# Patient Record
Sex: FEMALE | Race: WHITE | Employment: OTHER | URBAN - METROPOLITAN AREA
[De-identification: names, ages, dates, MRNs, and addresses within clinical notes are randomized per-mention and may not be internally consistent; named-entity substitution may affect disease eponyms.]

---

## 2017-02-09 PROBLEM — N18.9 CKD (CHRONIC KIDNEY DISEASE): Chronic | Status: ACTIVE | Noted: 2017-02-09

## 2017-02-09 PROBLEM — M46.46 DISCITIS OF LUMBAR REGION: Status: ACTIVE | Noted: 2017-02-09

## 2017-02-09 PROBLEM — I10 HTN (HYPERTENSION): Chronic | Status: ACTIVE | Noted: 2017-02-09

## 2017-02-09 PROBLEM — M54.50 CHRONIC LOWER BACK PAIN: Chronic | Status: ACTIVE | Noted: 2017-02-09

## 2017-02-09 PROBLEM — E78.5 HYPERLIPIDEMIA: Chronic | Status: ACTIVE | Noted: 2017-02-09

## 2017-02-09 PROBLEM — G89.29 CHRONIC LOWER BACK PAIN: Chronic | Status: ACTIVE | Noted: 2017-02-09

## 2017-02-10 PROBLEM — G25.81 RLS (RESTLESS LEGS SYNDROME): Chronic | Status: ACTIVE | Noted: 2017-02-10

## 2017-03-02 PROBLEM — G89.29 CHRONIC PAIN: Chronic | Status: ACTIVE | Noted: 2017-03-02

## 2017-03-02 PROBLEM — M47.816 OSTEOARTHRITIS OF LUMBAR SPINE: Chronic | Status: ACTIVE | Noted: 2017-03-02

## 2017-03-02 PROBLEM — M47.817 LUMBOSACRAL SPONDYLOSIS: Chronic | Status: ACTIVE | Noted: 2017-03-02

## 2017-03-02 PROBLEM — M47.816 LUMBAR FACET ARTHROPATHY: Chronic | Status: ACTIVE | Noted: 2017-03-02

## 2017-03-02 PROBLEM — M51.36 BULGING LUMBAR DISC: Chronic | Status: ACTIVE | Noted: 2017-02-09

## 2017-03-02 PROBLEM — M51.36 BULGING LUMBAR DISC: Status: ACTIVE | Noted: 2017-02-09

## 2017-03-02 PROBLEM — M48.061 LUMBAR FORAMINAL STENOSIS: Chronic | Status: ACTIVE | Noted: 2017-03-02

## 2018-06-13 ENCOUNTER — OFFICE VISIT (OUTPATIENT)
Dept: NEUROSURGERY | Age: 83
End: 2018-06-13
Payer: MEDICARE

## 2018-06-13 VITALS
SYSTOLIC BLOOD PRESSURE: 169 MMHG | BODY MASS INDEX: 25.72 KG/M2 | WEIGHT: 131 LBS | DIASTOLIC BLOOD PRESSURE: 103 MMHG | HEIGHT: 60 IN

## 2018-06-13 DIAGNOSIS — G89.29 CHRONIC MIDLINE LOW BACK PAIN WITH BILATERAL SCIATICA: Primary | ICD-10-CM

## 2018-06-13 DIAGNOSIS — M54.41 CHRONIC MIDLINE LOW BACK PAIN WITH BILATERAL SCIATICA: Primary | ICD-10-CM

## 2018-06-13 DIAGNOSIS — M54.42 CHRONIC MIDLINE LOW BACK PAIN WITH BILATERAL SCIATICA: Primary | ICD-10-CM

## 2018-06-13 PROCEDURE — G8427 DOCREV CUR MEDS BY ELIG CLIN: HCPCS | Performed by: NEUROLOGICAL SURGERY

## 2018-06-13 PROCEDURE — G8399 PT W/DXA RESULTS DOCUMENT: HCPCS | Performed by: NEUROLOGICAL SURGERY

## 2018-06-13 PROCEDURE — 99213 OFFICE O/P EST LOW 20 MIN: CPT | Performed by: NEUROLOGICAL SURGERY

## 2018-06-13 PROCEDURE — 4040F PNEUMOC VAC/ADMIN/RCVD: CPT | Performed by: NEUROLOGICAL SURGERY

## 2018-06-13 PROCEDURE — 1123F ACP DISCUSS/DSCN MKR DOCD: CPT | Performed by: NEUROLOGICAL SURGERY

## 2018-06-13 PROCEDURE — G8419 CALC BMI OUT NRM PARAM NOF/U: HCPCS | Performed by: NEUROLOGICAL SURGERY

## 2018-06-13 PROCEDURE — 1090F PRES/ABSN URINE INCON ASSESS: CPT | Performed by: NEUROLOGICAL SURGERY

## 2018-06-13 PROCEDURE — 1036F TOBACCO NON-USER: CPT | Performed by: NEUROLOGICAL SURGERY

## 2018-06-13 RX ORDER — HYDROCODONE BITARTRATE AND ACETAMINOPHEN 5; 325 MG/1; MG/1
1 TABLET ORAL EVERY 12 HOURS PRN
Qty: 14 TABLET | Refills: 0 | Status: SHIPPED | OUTPATIENT
Start: 2018-06-13 | End: 2018-06-20

## 2018-06-15 ENCOUNTER — HOSPITAL ENCOUNTER (OUTPATIENT)
Age: 83
Discharge: HOME | End: 2018-06-17
Payer: MEDICARE

## 2018-06-15 LAB
ALBUMIN SERPL-MCNC: 4.2 G/DL (ref 3.5–5.2)
ALP BLD-CCNC: 104 U/L (ref 35–104)
ALT SERPL-CCNC: 24 U/L (ref 0–32)
ANION GAP SERPL CALCULATED.3IONS-SCNC: 16 MMOL/L (ref 7–16)
AST SERPL-CCNC: 29 U/L (ref 0–31)
BASOPHILS ABSOLUTE: 0.02 E9/L (ref 0–0.2)
BASOPHILS RELATIVE PERCENT: 0.3 % (ref 0–2)
BILIRUB SERPL-MCNC: 0.4 MG/DL (ref 0–1.2)
BUN BLDV-MCNC: 28 MG/DL (ref 8–23)
CALCIUM SERPL-MCNC: 9.5 MG/DL (ref 8.6–10.2)
CHLORIDE BLD-SCNC: 101 MMOL/L (ref 98–107)
CO2: 24 MMOL/L (ref 22–29)
CREAT SERPL-MCNC: 1.7 MG/DL (ref 0.5–1)
EOSINOPHILS ABSOLUTE: 0.13 E9/L (ref 0.05–0.5)
EOSINOPHILS RELATIVE PERCENT: 1.6 % (ref 0–6)
GFR AFRICAN AMERICAN: 35
GFR NON-AFRICAN AMERICAN: 29 ML/MIN/1.73
GLUCOSE BLD-MCNC: 92 MG/DL (ref 74–109)
HCT VFR BLD CALC: 36.7 % (ref 34–48)
HEMOGLOBIN: 11.5 G/DL (ref 11.5–15.5)
IMMATURE GRANULOCYTES #: 0.02 E9/L
IMMATURE GRANULOCYTES %: 0.3 % (ref 0–5)
LYMPHOCYTES ABSOLUTE: 1.38 E9/L (ref 1.5–4)
LYMPHOCYTES RELATIVE PERCENT: 17.3 % (ref 20–42)
MCH RBC QN AUTO: 29.6 PG (ref 26–35)
MCHC RBC AUTO-ENTMCNC: 31.3 % (ref 32–34.5)
MCV RBC AUTO: 94.3 FL (ref 80–99.9)
MONOCYTES ABSOLUTE: 0.79 E9/L (ref 0.1–0.95)
MONOCYTES RELATIVE PERCENT: 9.9 % (ref 2–12)
NEUTROPHILS ABSOLUTE: 5.64 E9/L (ref 1.8–7.3)
NEUTROPHILS RELATIVE PERCENT: 70.6 % (ref 43–80)
PDW BLD-RTO: 14.7 FL (ref 11.5–15)
PLATELET # BLD: 283 E9/L (ref 130–450)
PMV BLD AUTO: 9.4 FL (ref 7–12)
POTASSIUM SERPL-SCNC: 4.4 MMOL/L (ref 3.5–5)
RBC # BLD: 3.89 E12/L (ref 3.5–5.5)
SODIUM BLD-SCNC: 141 MMOL/L (ref 132–146)
T4 FREE: 1.6 NG/DL (ref 0.93–1.7)
TOTAL PROTEIN: 7.1 G/DL (ref 6.4–8.3)
TSH SERPL DL<=0.05 MIU/L-ACNC: 1.55 UIU/ML (ref 0.27–4.2)
WBC # BLD: 8 E9/L (ref 4.5–11.5)

## 2018-06-15 PROCEDURE — 85025 COMPLETE CBC W/AUTO DIFF WBC: CPT

## 2018-06-15 PROCEDURE — 84443 ASSAY THYROID STIM HORMONE: CPT

## 2018-06-15 PROCEDURE — 84439 ASSAY OF FREE THYROXINE: CPT

## 2018-06-15 PROCEDURE — 80053 COMPREHEN METABOLIC PANEL: CPT

## 2018-07-17 ENCOUNTER — TELEPHONE (OUTPATIENT)
Dept: NEUROSURGERY | Age: 83
End: 2018-07-17

## 2018-08-17 ENCOUNTER — PREP FOR PROCEDURE (OUTPATIENT)
Dept: NEUROSURGERY | Age: 83
End: 2018-08-17

## 2018-08-17 DIAGNOSIS — M51.9 LUMBAR DISC DISEASE: Primary | ICD-10-CM

## 2018-08-17 RX ORDER — SODIUM CHLORIDE 0.9 % (FLUSH) 0.9 %
10 SYRINGE (ML) INJECTION PRN
Status: CANCELLED | OUTPATIENT
Start: 2018-08-17 | End: 2019-08-17

## 2018-08-17 RX ORDER — SODIUM CHLORIDE 0.9 % (FLUSH) 0.9 %
10 SYRINGE (ML) INJECTION EVERY 12 HOURS SCHEDULED
Status: CANCELLED | OUTPATIENT
Start: 2018-08-17 | End: 2019-08-17

## 2018-08-17 RX ORDER — SODIUM CHLORIDE 9 MG/ML
INJECTION, SOLUTION INTRAVENOUS CONTINUOUS
Status: CANCELLED | OUTPATIENT
Start: 2018-08-17 | End: 2019-08-17

## 2018-09-10 ENCOUNTER — HOSPITAL ENCOUNTER (OUTPATIENT)
Dept: GENERAL RADIOLOGY | Age: 83
Discharge: HOME | DRG: 454 | End: 2018-09-12
Payer: MEDICARE

## 2018-09-10 ENCOUNTER — HOSPITAL ENCOUNTER (OUTPATIENT)
Dept: PREADMISSION TESTING | Age: 83
Discharge: HOME | DRG: 454 | End: 2018-09-10
Payer: MEDICARE

## 2018-09-10 ENCOUNTER — ANESTHESIA EVENT (OUTPATIENT)
Dept: OPERATING ROOM | Age: 83
DRG: 454 | End: 2018-09-10
Payer: MEDICARE

## 2018-09-10 VITALS
DIASTOLIC BLOOD PRESSURE: 83 MMHG | BODY MASS INDEX: 25.13 KG/M2 | RESPIRATION RATE: 12 BRPM | TEMPERATURE: 97.5 F | HEART RATE: 66 BPM | OXYGEN SATURATION: 98 % | WEIGHT: 128 LBS | HEIGHT: 60 IN | SYSTOLIC BLOOD PRESSURE: 175 MMHG

## 2018-09-10 DIAGNOSIS — M51.9 LUMBAR DISC DISEASE: ICD-10-CM

## 2018-09-10 DIAGNOSIS — Z01.818 PREOP TESTING: Primary | ICD-10-CM

## 2018-09-10 LAB
ABO/RH: NORMAL
ANION GAP SERPL CALCULATED.3IONS-SCNC: 11 MMOL/L (ref 7–16)
ANTIBODY SCREEN: NORMAL
BASOPHILS ABSOLUTE: 0.02 E9/L (ref 0–0.2)
BASOPHILS RELATIVE PERCENT: 0.3 % (ref 0–2)
BILIRUBIN URINE: NEGATIVE
BLOOD, URINE: NEGATIVE
BUN BLDV-MCNC: 31 MG/DL (ref 8–23)
CALCIUM SERPL-MCNC: 9.5 MG/DL (ref 8.6–10.2)
CHLORIDE BLD-SCNC: 105 MMOL/L (ref 98–107)
CLARITY: CLEAR
CO2: 27 MMOL/L (ref 22–29)
COLOR: YELLOW
CREAT SERPL-MCNC: 1.5 MG/DL (ref 0.5–1)
EOSINOPHILS ABSOLUTE: 0.14 E9/L (ref 0.05–0.5)
EOSINOPHILS RELATIVE PERCENT: 2 % (ref 0–6)
GFR AFRICAN AMERICAN: 40
GFR NON-AFRICAN AMERICAN: 33 ML/MIN/1.73
GLUCOSE BLD-MCNC: 91 MG/DL (ref 74–109)
GLUCOSE URINE: NEGATIVE MG/DL
HCT VFR BLD CALC: 36.3 % (ref 34–48)
HEMOGLOBIN: 11.8 G/DL (ref 11.5–15.5)
IMMATURE GRANULOCYTES #: 0.02 E9/L
IMMATURE GRANULOCYTES %: 0.3 % (ref 0–5)
INR BLD: 1
KETONES, URINE: NEGATIVE MG/DL
LEUKOCYTE ESTERASE, URINE: NEGATIVE
LYMPHOCYTES ABSOLUTE: 1.48 E9/L (ref 1.5–4)
LYMPHOCYTES RELATIVE PERCENT: 21.1 % (ref 20–42)
MCH RBC QN AUTO: 30.3 PG (ref 26–35)
MCHC RBC AUTO-ENTMCNC: 32.5 % (ref 32–34.5)
MCV RBC AUTO: 93.1 FL (ref 80–99.9)
MONOCYTES ABSOLUTE: 0.64 E9/L (ref 0.1–0.95)
MONOCYTES RELATIVE PERCENT: 9.1 % (ref 2–12)
NEUTROPHILS ABSOLUTE: 4.7 E9/L (ref 1.8–7.3)
NEUTROPHILS RELATIVE PERCENT: 67.2 % (ref 43–80)
NITRITE, URINE: NEGATIVE
PDW BLD-RTO: 14.7 FL (ref 11.5–15)
PH UA: 6 (ref 5–9)
PLATELET # BLD: 254 E9/L (ref 130–450)
PMV BLD AUTO: 8.8 FL (ref 7–12)
POTASSIUM REFLEX MAGNESIUM: 4.7 MMOL/L (ref 3.5–5)
PROTEIN UA: NEGATIVE MG/DL
PROTHROMBIN TIME: 11.1 SEC (ref 9.3–12.4)
RBC # BLD: 3.9 E12/L (ref 3.5–5.5)
SODIUM BLD-SCNC: 143 MMOL/L (ref 132–146)
SPECIFIC GRAVITY UA: 1.02 (ref 1–1.03)
UROBILINOGEN, URINE: 0.2 E.U./DL
WBC # BLD: 7 E9/L (ref 4.5–11.5)

## 2018-09-10 PROCEDURE — 36415 COLL VENOUS BLD VENIPUNCTURE: CPT

## 2018-09-10 PROCEDURE — 87081 CULTURE SCREEN ONLY: CPT

## 2018-09-10 PROCEDURE — 81003 URINALYSIS AUTO W/O SCOPE: CPT

## 2018-09-10 PROCEDURE — 86901 BLOOD TYPING SEROLOGIC RH(D): CPT

## 2018-09-10 PROCEDURE — 86850 RBC ANTIBODY SCREEN: CPT

## 2018-09-10 PROCEDURE — 71046 X-RAY EXAM CHEST 2 VIEWS: CPT

## 2018-09-10 PROCEDURE — 80048 BASIC METABOLIC PNL TOTAL CA: CPT

## 2018-09-10 PROCEDURE — 85610 PROTHROMBIN TIME: CPT

## 2018-09-10 PROCEDURE — 86900 BLOOD TYPING SEROLOGIC ABO: CPT

## 2018-09-10 PROCEDURE — 87088 URINE BACTERIA CULTURE: CPT

## 2018-09-10 PROCEDURE — 85025 COMPLETE CBC W/AUTO DIFF WBC: CPT

## 2018-09-10 ASSESSMENT — PAIN DESCRIPTION - PROGRESSION: CLINICAL_PROGRESSION: GRADUALLY WORSENING

## 2018-09-10 ASSESSMENT — PAIN DESCRIPTION - DESCRIPTORS: DESCRIPTORS: NUMBNESS

## 2018-09-10 ASSESSMENT — PAIN DESCRIPTION - ORIENTATION: ORIENTATION: LEFT

## 2018-09-10 ASSESSMENT — PAIN DESCRIPTION - LOCATION: LOCATION: BACK;FOOT

## 2018-09-10 ASSESSMENT — PAIN DESCRIPTION - ONSET: ONSET: ON-GOING

## 2018-09-10 NOTE — PROGRESS NOTES
Mahnazoh[address] 37 PRE-ADMISSION TESTING GENERAL INSTRUCTIONS- [facility]-phone number:[phone]    GENERAL INSTRUCTIONS  [x] Antibacterial Soap shower Night before and/or AM of Surgery  [x] Onofre wipe instruction sheet and wipes given. [x] Nothing by mouth after midnight, including gum, candy, mints, or water. [x] You may brush your teeth, gargle, but do NOT swallow water. []Hibiclens shower  the night before and the morning of surgery. Do not use             Hibiclens on your face or head. [x]No smoking, chewing tobacco, illegal drugs, or alcohol within 24 hours of your surgery. [x] Jewelry, valuables or body piercing's should not be brought to the hospital. All body and/or tongue piercing's must be removed prior to arriving to hospital.  ALL hair pins must be removed. [x] Do not wear makeup, lotions, powders, deodorant. Nail polish as directed by the nurse. [x] Arrange transportation to and from the hospital.  Arrange for someone to be with you for the remainder of the day and for 24 hours after your procedure due to having had anesthesia. [] Bring insurance card and photo ID. [x] Transfusion Bracelet: Please bring with you to hospital, day of surgery  [] Bring urine specimen day of surgery. Any small container is acceptable. [] Use inhalers the morning of surgery and bring with you to hospital.   []Bring copy of living will or healthcare power of  papers to be placed in your electronic record. [] CPAP/BI-PAP: Please bring your machine if you are to spend the night in the hospital.     ENDOSCOPY INSTRUCTIONS:   [] Bowel prep instructions reviewed. [] Nothing by mouth after midnight, including gum, candy, mints, or water.  [] You may brush your teeth, gargle, but do NOT swallow water. [] Do not wear makeup, lotions, powders, deodorant. Nail polish as directed by the nurse.   [] Arrange transportation to and from the hospital.  Arrange for someone to be with you for the remainder of the day due to having had anesthesia. PARKING INSTRUCTIONS:   · [x] Arrival [id number] [x] Parking lot 1 is located on [facility] (the corner of [facility] and [facility]). To enter, press the button and the gate will lift. A free token will be provided to exit the lot. One car per patient is allowed to park in this lot. All other cars are to park on [address] [place] [address] Street either in the parking garage or the handicap lot. [] Free  parking is available on [address] [place] [address] Street. · [] To reach the [facility] lobby from [address] [place] [address], upon entering the hospital, take elevator B to the 3rd floor. EDUCATION INSTRUCTIONS:      [] Knee or hip replacement booklet & exercise pamphlets given. [] Lucas Londono placed in chart. [x] Pre-admission Testing educational folder given  [x] Incentive Spirometry,coughing & deep breathing exercises reviewed. []Medication information sheet(s)   [x]Fluoroscopy-Xray used in surgery reviewed with patient. Educational pamphlet placed in chart. [x]Pain: Post-op pain is normal and to be expected. You will be asked to rate your pain from 0-10(a zero is not acceptable-education is needed). Your post-op pain goal is:  [x] Ask your nurse for your pain medication. [] Joint camp offered. [] Joint replacement booklets given. []Other     MEDICATION INSTRUCTIONS:   [x]Bring a complete list of your medications, please write the last time you took the medicine, give this list to the nurse. GIVEN  [x] Take the following medications the morning of surgery with 1-2 ounces of water:   [] Stop herbal supplements and vitamins 5 days before your surgery. [] DO NOT take any diabetic medicine the morning of surgery. Follow instructions for insulin the day before surgery. [] If you are diabetic and your blood sugar is low or you feel symptomatic, you may drink 1-2 ounces of apple juice or take a glucose tablet.   The morning of your procedure, you

## 2018-09-11 LAB — URINE CULTURE, ROUTINE: NORMAL

## 2018-09-12 LAB — MRSA CULTURE ONLY: NORMAL

## 2018-09-13 ENCOUNTER — HOSPITAL ENCOUNTER (INPATIENT)
Age: 83
LOS: 4 days | Discharge: NURSING, REHAB OR LONG-TERM CARE | DRG: 454 | End: 2018-09-17
Attending: NEUROLOGICAL SURGERY | Admitting: NEUROLOGICAL SURGERY
Payer: MEDICARE

## 2018-09-13 ENCOUNTER — ANESTHESIA (OUTPATIENT)
Dept: OPERATING ROOM | Age: 83
DRG: 454 | End: 2018-09-13
Payer: MEDICARE

## 2018-09-13 ENCOUNTER — APPOINTMENT (OUTPATIENT)
Dept: GENERAL RADIOLOGY | Age: 83
DRG: 454 | End: 2018-09-13
Attending: NEUROLOGICAL SURGERY
Payer: MEDICARE

## 2018-09-13 VITALS
OXYGEN SATURATION: 100 % | DIASTOLIC BLOOD PRESSURE: 87 MMHG | TEMPERATURE: 93.4 F | RESPIRATION RATE: 12 BRPM | SYSTOLIC BLOOD PRESSURE: 195 MMHG

## 2018-09-13 DIAGNOSIS — M54.40 CHRONIC LOW BACK PAIN WITH SCIATICA, SCIATICA LATERALITY UNSPECIFIED, UNSPECIFIED BACK PAIN LATERALITY: Primary | Chronic | ICD-10-CM

## 2018-09-13 DIAGNOSIS — G89.29 CHRONIC LOW BACK PAIN WITH SCIATICA, SCIATICA LATERALITY UNSPECIFIED, UNSPECIFIED BACK PAIN LATERALITY: Primary | Chronic | ICD-10-CM

## 2018-09-13 PROBLEM — M51.26 LUMBAR DISC HERNIATION: Status: ACTIVE | Noted: 2018-09-13

## 2018-09-13 LAB
BLOOD BANK DISPENSE STATUS: NORMAL
BLOOD BANK DISPENSE STATUS: NORMAL
BLOOD BANK PRODUCT CODE: NORMAL
BLOOD BANK PRODUCT CODE: NORMAL
BPU ID: NORMAL
BPU ID: NORMAL
DESCRIPTION BLOOD BANK: NORMAL
DESCRIPTION BLOOD BANK: NORMAL
HCT VFR BLD CALC: 18.2 % (ref 34–48)
HEMOGLOBIN: 5.9 G/DL (ref 11.5–15.5)

## 2018-09-13 PROCEDURE — 3600000015 HC SURGERY LEVEL 5 ADDTL 15MIN: Performed by: NEUROLOGICAL SURGERY

## 2018-09-13 PROCEDURE — 6360000002 HC RX W HCPCS

## 2018-09-13 PROCEDURE — 22633 ARTHRD CMBN 1NTRSPC LUMBAR: CPT | Performed by: NEUROLOGICAL SURGERY

## 2018-09-13 PROCEDURE — 2720000010 HC SURG SUPPLY STERILE: Performed by: NEUROLOGICAL SURGERY

## 2018-09-13 PROCEDURE — 22842 INSERT SPINE FIXATION DEVICE: CPT | Performed by: PHYSICIAN ASSISTANT

## 2018-09-13 PROCEDURE — 88311 DECALCIFY TISSUE: CPT

## 2018-09-13 PROCEDURE — 22842 INSERT SPINE FIXATION DEVICE: CPT | Performed by: NEUROLOGICAL SURGERY

## 2018-09-13 PROCEDURE — 7100000000 HC PACU RECOVERY - FIRST 15 MIN: Performed by: NEUROLOGICAL SURGERY

## 2018-09-13 PROCEDURE — 6360000002 HC RX W HCPCS: Performed by: NURSE ANESTHETIST, CERTIFIED REGISTERED

## 2018-09-13 PROCEDURE — 22633 ARTHRD CMBN 1NTRSPC LUMBAR: CPT | Performed by: PHYSICIAN ASSISTANT

## 2018-09-13 PROCEDURE — 63047 LAM FACETEC & FORAMOT LUMBAR: CPT | Performed by: NEUROLOGICAL SURGERY

## 2018-09-13 PROCEDURE — 6370000000 HC RX 637 (ALT 250 FOR IP): Performed by: NEUROLOGICAL SURGERY

## 2018-09-13 PROCEDURE — 63048 LAM FACETEC &FORAMOT EA ADDL: CPT | Performed by: NEUROLOGICAL SURGERY

## 2018-09-13 PROCEDURE — 2709999900 HC NON-CHARGEABLE SUPPLY: Performed by: NEUROLOGICAL SURGERY

## 2018-09-13 PROCEDURE — 22614 ARTHRD PST TQ 1NTRSPC EA ADD: CPT | Performed by: PHYSICIAN ASSISTANT

## 2018-09-13 PROCEDURE — 6360000002 HC RX W HCPCS: Performed by: PHYSICIAN ASSISTANT

## 2018-09-13 PROCEDURE — 22614 ARTHRD PST TQ 1NTRSPC EA ADD: CPT | Performed by: NEUROLOGICAL SURGERY

## 2018-09-13 PROCEDURE — 95940 IONM IN OPERATNG ROOM 15 MIN: CPT | Performed by: AUDIOLOGIST

## 2018-09-13 PROCEDURE — 86923 COMPATIBILITY TEST ELECTRIC: CPT

## 2018-09-13 PROCEDURE — 2580000003 HC RX 258: Performed by: NEUROLOGICAL SURGERY

## 2018-09-13 PROCEDURE — C1713 ANCHOR/SCREW BN/BN,TIS/BN: HCPCS | Performed by: NEUROLOGICAL SURGERY

## 2018-09-13 PROCEDURE — 2500000003 HC RX 250 WO HCPCS: Performed by: NEUROLOGICAL SURGERY

## 2018-09-13 PROCEDURE — 6360000002 HC RX W HCPCS: Performed by: ANESTHESIOLOGY

## 2018-09-13 PROCEDURE — 0SG10AJ FUSION OF 2 OR MORE LUMBAR VERTEBRAL JOINTS WITH INTERBODY FUSION DEVICE, POSTERIOR APPROACH, ANTERIOR COLUMN, OPEN APPROACH: ICD-10-PCS | Performed by: NEUROLOGICAL SURGERY

## 2018-09-13 PROCEDURE — L8699 PROSTHETIC IMPLANT NOS: HCPCS | Performed by: NEUROLOGICAL SURGERY

## 2018-09-13 PROCEDURE — 2580000003 HC RX 258: Performed by: PHYSICIAN ASSISTANT

## 2018-09-13 PROCEDURE — 4A11X4G MONITORING OF PERIPHERAL NERVOUS ELECTRICAL ACTIVITY, INTRAOPERATIVE, EXTERNAL APPROACH: ICD-10-PCS | Performed by: NEUROLOGICAL SURGERY

## 2018-09-13 PROCEDURE — 0SG1071 FUSION OF 2 OR MORE LUMBAR VERTEBRAL JOINTS WITH AUTOLOGOUS TISSUE SUBSTITUTE, POSTERIOR APPROACH, POSTERIOR COLUMN, OPEN APPROACH: ICD-10-PCS | Performed by: NEUROLOGICAL SURGERY

## 2018-09-13 PROCEDURE — 3700000001 HC ADD 15 MINUTES (ANESTHESIA): Performed by: NEUROLOGICAL SURGERY

## 2018-09-13 PROCEDURE — 95910 NRV CNDJ TEST 7-8 STUDIES: CPT | Performed by: AUDIOLOGIST

## 2018-09-13 PROCEDURE — 88304 TISSUE EXAM BY PATHOLOGIST: CPT

## 2018-09-13 PROCEDURE — 2500000003 HC RX 250 WO HCPCS: Performed by: NURSE ANESTHETIST, CERTIFIED REGISTERED

## 2018-09-13 PROCEDURE — 36415 COLL VENOUS BLD VENIPUNCTURE: CPT

## 2018-09-13 PROCEDURE — P9016 RBC LEUKOCYTES REDUCED: HCPCS

## 2018-09-13 PROCEDURE — 63047 LAM FACETEC & FORAMOT LUMBAR: CPT | Performed by: PHYSICIAN ASSISTANT

## 2018-09-13 PROCEDURE — 3E0U0GB INTRODUCTION OF RECOMBINANT BONE MORPHOGENETIC PROTEIN INTO JOINTS, OPEN APPROACH: ICD-10-PCS | Performed by: NEUROLOGICAL SURGERY

## 2018-09-13 PROCEDURE — 3700000000 HC ANESTHESIA ATTENDED CARE: Performed by: NEUROLOGICAL SURGERY

## 2018-09-13 PROCEDURE — 3209999900 FLUORO FOR SURGICAL PROCEDURES

## 2018-09-13 PROCEDURE — 7100000001 HC PACU RECOVERY - ADDTL 15 MIN: Performed by: NEUROLOGICAL SURGERY

## 2018-09-13 PROCEDURE — 85018 HEMOGLOBIN: CPT

## 2018-09-13 PROCEDURE — 6360000002 HC RX W HCPCS: Performed by: NEUROLOGICAL SURGERY

## 2018-09-13 PROCEDURE — 1200000000 HC SEMI PRIVATE

## 2018-09-13 PROCEDURE — 88307 TISSUE EXAM BY PATHOLOGIST: CPT

## 2018-09-13 PROCEDURE — P9041 ALBUMIN (HUMAN),5%, 50ML: HCPCS | Performed by: NURSE ANESTHETIST, CERTIFIED REGISTERED

## 2018-09-13 PROCEDURE — 0SG00AJ FUSION OF LUMBAR VERTEBRAL JOINT WITH INTERBODY FUSION DEVICE, POSTERIOR APPROACH, ANTERIOR COLUMN, OPEN APPROACH: ICD-10-PCS | Performed by: NEUROLOGICAL SURGERY

## 2018-09-13 PROCEDURE — 22853 INSJ BIOMECHANICAL DEVICE: CPT | Performed by: PHYSICIAN ASSISTANT

## 2018-09-13 PROCEDURE — 0ST20ZZ RESECTION OF LUMBAR VERTEBRAL DISC, OPEN APPROACH: ICD-10-PCS | Performed by: NEUROLOGICAL SURGERY

## 2018-09-13 PROCEDURE — 63048 LAM FACETEC &FORAMOT EA ADDL: CPT | Performed by: PHYSICIAN ASSISTANT

## 2018-09-13 PROCEDURE — 2580000003 HC RX 258: Performed by: ANESTHESIOLOGY

## 2018-09-13 PROCEDURE — 22853 INSJ BIOMECHANICAL DEVICE: CPT | Performed by: NEUROLOGICAL SURGERY

## 2018-09-13 PROCEDURE — P9041 ALBUMIN (HUMAN),5%, 50ML: HCPCS

## 2018-09-13 PROCEDURE — 2700000000 HC OXYGEN THERAPY PER DAY

## 2018-09-13 PROCEDURE — 85014 HEMATOCRIT: CPT

## 2018-09-13 PROCEDURE — 3600000005 HC SURGERY LEVEL 5 BASE: Performed by: NEUROLOGICAL SURGERY

## 2018-09-13 PROCEDURE — 2580000003 HC RX 258: Performed by: NURSE ANESTHETIST, CERTIFIED REGISTERED

## 2018-09-13 DEVICE — CEMENT C01A KYPHX HV-R BONE CEMENT EN
Type: IMPLANTABLE DEVICE | Site: VERTEBRAE | Status: FUNCTIONAL
Brand: KYPHON® HV-R® BONE CEMENT

## 2018-09-13 DEVICE — SET SCREW 5440030 4.75 TI NS BRK OFF
Type: IMPLANTABLE DEVICE | Site: VERTEBRAE | Status: FUNCTIONAL
Brand: CD HORIZON® SPINAL SYSTEM

## 2018-09-13 DEVICE — BONE GRAFT KIT 7510800 INFUSE LARGE II
Type: IMPLANTABLE DEVICE | Site: VERTEBRAE | Status: FUNCTIONAL
Brand: INFUSE® BONE GRAFT

## 2018-09-13 DEVICE — SCREW 54840005540 MAS 5.5X40 CC
Type: IMPLANTABLE DEVICE | Site: VERTEBRAE | Status: FUNCTIONAL
Brand: CD HORIZON® SPINAL SYSTEM

## 2018-09-13 DEVICE — SPACER 2991022 CAPSTONE PEEK 10X22
Type: IMPLANTABLE DEVICE | Site: VERTEBRAE | Status: FUNCTIONAL
Brand: CAPSTONE® SPINAL SYSTEM

## 2018-09-13 DEVICE — SCREW 54840006540 MAS 6.5X40 CC
Type: IMPLANTABLE DEVICE | Site: VERTEBRAE | Status: FUNCTIONAL
Brand: CD HORIZON® SPINAL SYSTEM

## 2018-09-13 DEVICE — SCREW 54840006535 MAS 6.5X35 CC
Type: IMPLANTABLE DEVICE | Site: VERTEBRAE | Status: FUNCTIONAL
Brand: CD HORIZON® SPINAL SYSTEM

## 2018-09-13 DEVICE — ROD 1475501060 4.75 CCM NS CURV 60MM
Type: IMPLANTABLE DEVICE | Site: VERTEBRAE | Status: FUNCTIONAL
Brand: CD HORIZON® SPINAL SYSTEM

## 2018-09-13 RX ORDER — PROPOFOL 10 MG/ML
INJECTION, EMULSION INTRAVENOUS PRN
Status: DISCONTINUED | OUTPATIENT
Start: 2018-09-13 | End: 2018-09-13 | Stop reason: SDUPTHER

## 2018-09-13 RX ORDER — ALBUMIN, HUMAN INJ 5% 5 %
SOLUTION INTRAVENOUS
Status: COMPLETED
Start: 2018-09-13 | End: 2018-09-13

## 2018-09-13 RX ORDER — HYDROCHLOROTHIAZIDE 12.5 MG/1
6.25 TABLET ORAL DAILY
Status: DISCONTINUED | OUTPATIENT
Start: 2018-09-13 | End: 2018-09-17 | Stop reason: HOSPADM

## 2018-09-13 RX ORDER — MORPHINE SULFATE 4 MG/ML
4 INJECTION, SOLUTION INTRAMUSCULAR; INTRAVENOUS
Status: DISCONTINUED | OUTPATIENT
Start: 2018-09-13 | End: 2018-09-17 | Stop reason: HOSPADM

## 2018-09-13 RX ORDER — HYDRALAZINE HYDROCHLORIDE 10 MG/1
10 TABLET, FILM COATED ORAL 4 TIMES DAILY
Status: DISCONTINUED | OUTPATIENT
Start: 2018-09-13 | End: 2018-09-17 | Stop reason: HOSPADM

## 2018-09-13 RX ORDER — HYDRALAZINE HYDROCHLORIDE 20 MG/ML
INJECTION INTRAMUSCULAR; INTRAVENOUS PRN
Status: DISCONTINUED | OUTPATIENT
Start: 2018-09-13 | End: 2018-09-13 | Stop reason: SDUPTHER

## 2018-09-13 RX ORDER — ONDANSETRON 2 MG/ML
INJECTION INTRAMUSCULAR; INTRAVENOUS PRN
Status: DISCONTINUED | OUTPATIENT
Start: 2018-09-13 | End: 2018-09-13 | Stop reason: SDUPTHER

## 2018-09-13 RX ORDER — HYDRALAZINE HYDROCHLORIDE 20 MG/ML
5 INJECTION INTRAMUSCULAR; INTRAVENOUS EVERY 10 MIN PRN
Status: DISCONTINUED | OUTPATIENT
Start: 2018-09-13 | End: 2018-09-13 | Stop reason: HOSPADM

## 2018-09-13 RX ORDER — BISACODYL 10 MG
10 SUPPOSITORY, RECTAL RECTAL DAILY PRN
Status: DISCONTINUED | OUTPATIENT
Start: 2018-09-13 | End: 2018-09-17 | Stop reason: HOSPADM

## 2018-09-13 RX ORDER — SODIUM CHLORIDE 0.9 % (FLUSH) 0.9 %
10 SYRINGE (ML) INJECTION EVERY 12 HOURS SCHEDULED
Status: DISCONTINUED | OUTPATIENT
Start: 2018-09-13 | End: 2018-09-13 | Stop reason: SDUPTHER

## 2018-09-13 RX ORDER — ESCITALOPRAM OXALATE 10 MG/1
5 TABLET ORAL DAILY
Status: DISCONTINUED | OUTPATIENT
Start: 2018-09-13 | End: 2018-09-17 | Stop reason: HOSPADM

## 2018-09-13 RX ORDER — METOCLOPRAMIDE HYDROCHLORIDE 5 MG/ML
10 INJECTION INTRAMUSCULAR; INTRAVENOUS EVERY 6 HOURS
Status: DISCONTINUED | OUTPATIENT
Start: 2018-09-13 | End: 2018-09-17 | Stop reason: HOSPADM

## 2018-09-13 RX ORDER — ALBUMIN, HUMAN INJ 5% 5 %
SOLUTION INTRAVENOUS PRN
Status: DISCONTINUED | OUTPATIENT
Start: 2018-09-13 | End: 2018-09-13 | Stop reason: SDUPTHER

## 2018-09-13 RX ORDER — SODIUM CHLORIDE 0.9 % (FLUSH) 0.9 %
10 SYRINGE (ML) INJECTION PRN
Status: DISCONTINUED | OUTPATIENT
Start: 2018-09-13 | End: 2018-09-17 | Stop reason: HOSPADM

## 2018-09-13 RX ORDER — MORPHINE SULFATE 2 MG/ML
2 INJECTION, SOLUTION INTRAMUSCULAR; INTRAVENOUS EVERY 5 MIN PRN
Status: DISCONTINUED | OUTPATIENT
Start: 2018-09-13 | End: 2018-09-13 | Stop reason: HOSPADM

## 2018-09-13 RX ORDER — ONDANSETRON 2 MG/ML
4 INJECTION INTRAMUSCULAR; INTRAVENOUS EVERY 6 HOURS PRN
Status: DISCONTINUED | OUTPATIENT
Start: 2018-09-13 | End: 2018-09-17 | Stop reason: HOSPADM

## 2018-09-13 RX ORDER — SENNA AND DOCUSATE SODIUM 50; 8.6 MG/1; MG/1
2 TABLET, FILM COATED ORAL DAILY PRN
Status: DISCONTINUED | OUTPATIENT
Start: 2018-09-13 | End: 2018-09-17 | Stop reason: HOSPADM

## 2018-09-13 RX ORDER — LORAZEPAM 1 MG/1
1 TABLET ORAL NIGHTLY
Status: DISCONTINUED | OUTPATIENT
Start: 2018-09-13 | End: 2018-09-17 | Stop reason: HOSPADM

## 2018-09-13 RX ORDER — NEOSTIGMINE METHYLSULFATE 0.5 MG/ML
INJECTION, SOLUTION INTRAVENOUS PRN
Status: DISCONTINUED | OUTPATIENT
Start: 2018-09-13 | End: 2018-09-13 | Stop reason: SDUPTHER

## 2018-09-13 RX ORDER — SODIUM CHLORIDE 0.9 % (FLUSH) 0.9 %
10 SYRINGE (ML) INJECTION EVERY 12 HOURS SCHEDULED
Status: DISCONTINUED | OUTPATIENT
Start: 2018-09-13 | End: 2018-09-17 | Stop reason: HOSPADM

## 2018-09-13 RX ORDER — VANCOMYCIN HYDROCHLORIDE 500 MG/10ML
INJECTION, POWDER, LYOPHILIZED, FOR SOLUTION INTRAVENOUS PRN
Status: DISCONTINUED | OUTPATIENT
Start: 2018-09-13 | End: 2018-09-13 | Stop reason: HOSPADM

## 2018-09-13 RX ORDER — LIDOCAINE HYDROCHLORIDE AND EPINEPHRINE 10; 10 MG/ML; UG/ML
INJECTION, SOLUTION INFILTRATION; PERINEURAL PRN
Status: DISCONTINUED | OUTPATIENT
Start: 2018-09-13 | End: 2018-09-13 | Stop reason: HOSPADM

## 2018-09-13 RX ORDER — LACTULOSE 10 G/15ML
20 SOLUTION ORAL 3 TIMES DAILY
Status: DISCONTINUED | OUTPATIENT
Start: 2018-09-13 | End: 2018-09-17 | Stop reason: HOSPADM

## 2018-09-13 RX ORDER — 0.9 % SODIUM CHLORIDE 0.9 %
250 INTRAVENOUS SOLUTION INTRAVENOUS ONCE
Status: COMPLETED | OUTPATIENT
Start: 2018-09-13 | End: 2018-09-13

## 2018-09-13 RX ORDER — ACETAMINOPHEN 325 MG/1
650 TABLET ORAL EVERY 4 HOURS PRN
Status: DISCONTINUED | OUTPATIENT
Start: 2018-09-13 | End: 2018-09-17 | Stop reason: HOSPADM

## 2018-09-13 RX ORDER — MEPERIDINE HYDROCHLORIDE 50 MG/ML
12.5 INJECTION INTRAMUSCULAR; INTRAVENOUS; SUBCUTANEOUS EVERY 5 MIN PRN
Status: DISCONTINUED | OUTPATIENT
Start: 2018-09-13 | End: 2018-09-13 | Stop reason: HOSPADM

## 2018-09-13 RX ORDER — PRAVASTATIN SODIUM 20 MG
20 TABLET ORAL DAILY
Status: DISCONTINUED | OUTPATIENT
Start: 2018-09-13 | End: 2018-09-17 | Stop reason: HOSPADM

## 2018-09-13 RX ORDER — HYDROCODONE BITARTRATE AND ACETAMINOPHEN 5; 325 MG/1; MG/1
2 TABLET ORAL EVERY 4 HOURS PRN
Status: DISCONTINUED | OUTPATIENT
Start: 2018-09-13 | End: 2018-09-17 | Stop reason: HOSPADM

## 2018-09-13 RX ORDER — ALBUMIN, HUMAN INJ 5% 5 %
25 SOLUTION INTRAVENOUS ONCE
Status: COMPLETED | OUTPATIENT
Start: 2018-09-13 | End: 2018-09-13

## 2018-09-13 RX ORDER — MORPHINE SULFATE 2 MG/ML
2 INJECTION, SOLUTION INTRAMUSCULAR; INTRAVENOUS
Status: DISCONTINUED | OUTPATIENT
Start: 2018-09-13 | End: 2018-09-17 | Stop reason: HOSPADM

## 2018-09-13 RX ORDER — LABETALOL 100 MG/1
100 TABLET, FILM COATED ORAL 2 TIMES DAILY
Status: DISCONTINUED | OUTPATIENT
Start: 2018-09-13 | End: 2018-09-17 | Stop reason: HOSPADM

## 2018-09-13 RX ORDER — CISATRACURIUM BESYLATE 2 MG/ML
INJECTION, SOLUTION INTRAVENOUS PRN
Status: DISCONTINUED | OUTPATIENT
Start: 2018-09-13 | End: 2018-09-13 | Stop reason: SDUPTHER

## 2018-09-13 RX ORDER — CYCLOBENZAPRINE HCL 10 MG
10 TABLET ORAL 3 TIMES DAILY PRN
Status: DISCONTINUED | OUTPATIENT
Start: 2018-09-13 | End: 2018-09-17 | Stop reason: HOSPADM

## 2018-09-13 RX ORDER — HYDROCODONE BITARTRATE AND ACETAMINOPHEN 5; 325 MG/1; MG/1
1 TABLET ORAL EVERY 4 HOURS PRN
Status: DISCONTINUED | OUTPATIENT
Start: 2018-09-13 | End: 2018-09-17 | Stop reason: HOSPADM

## 2018-09-13 RX ORDER — DEXAMETHASONE SODIUM PHOSPHATE 10 MG/ML
INJECTION INTRAMUSCULAR; INTRAVENOUS PRN
Status: DISCONTINUED | OUTPATIENT
Start: 2018-09-13 | End: 2018-09-13 | Stop reason: SDUPTHER

## 2018-09-13 RX ORDER — GLYCOPYRROLATE 1 MG/5 ML
SYRINGE (ML) INTRAVENOUS PRN
Status: DISCONTINUED | OUTPATIENT
Start: 2018-09-13 | End: 2018-09-13 | Stop reason: SDUPTHER

## 2018-09-13 RX ORDER — ROPINIROLE 0.5 MG/1
0.5 TABLET, FILM COATED ORAL NIGHTLY
Status: DISCONTINUED | OUTPATIENT
Start: 2018-09-13 | End: 2018-09-17 | Stop reason: HOSPADM

## 2018-09-13 RX ORDER — SODIUM CHLORIDE 9 MG/ML
INJECTION, SOLUTION INTRAVENOUS CONTINUOUS
Status: DISCONTINUED | OUTPATIENT
Start: 2018-09-13 | End: 2018-09-17 | Stop reason: HOSPADM

## 2018-09-13 RX ORDER — PROMETHAZINE HYDROCHLORIDE 25 MG/ML
6.25 INJECTION, SOLUTION INTRAMUSCULAR; INTRAVENOUS
Status: DISCONTINUED | OUTPATIENT
Start: 2018-09-13 | End: 2018-09-13 | Stop reason: HOSPADM

## 2018-09-13 RX ORDER — FENTANYL CITRATE 50 UG/ML
INJECTION, SOLUTION INTRAMUSCULAR; INTRAVENOUS PRN
Status: DISCONTINUED | OUTPATIENT
Start: 2018-09-13 | End: 2018-09-13 | Stop reason: SDUPTHER

## 2018-09-13 RX ORDER — LABETALOL HYDROCHLORIDE 5 MG/ML
5 INJECTION, SOLUTION INTRAVENOUS EVERY 10 MIN PRN
Status: DISCONTINUED | OUTPATIENT
Start: 2018-09-13 | End: 2018-09-13 | Stop reason: HOSPADM

## 2018-09-13 RX ORDER — BUPIVACAINE HYDROCHLORIDE 2.5 MG/ML
INJECTION, SOLUTION EPIDURAL; INFILTRATION; INTRACAUDAL PRN
Status: DISCONTINUED | OUTPATIENT
Start: 2018-09-13 | End: 2018-09-13 | Stop reason: HOSPADM

## 2018-09-13 RX ADMIN — ALBUMIN (HUMAN) 500 ML: 12.5 INJECTION, SOLUTION INTRAVENOUS at 07:35

## 2018-09-13 RX ADMIN — MORPHINE SULFATE 2 MG: 2 INJECTION, SOLUTION INTRAMUSCULAR; INTRAVENOUS at 15:14

## 2018-09-13 RX ADMIN — FENTANYL CITRATE 50 MCG: 50 INJECTION, SOLUTION INTRAMUSCULAR; INTRAVENOUS at 06:30

## 2018-09-13 RX ADMIN — HYDROCODONE BITARTRATE AND ACETAMINOPHEN 1 TABLET: 5; 325 TABLET ORAL at 21:03

## 2018-09-13 RX ADMIN — LABETALOL HYDROCHLORIDE 100 MG: 100 TABLET, FILM COATED ORAL at 21:03

## 2018-09-13 RX ADMIN — Medication 0.6 MG: at 09:00

## 2018-09-13 RX ADMIN — PRAVASTATIN SODIUM 20 MG: 20 TABLET ORAL at 21:03

## 2018-09-13 RX ADMIN — MORPHINE SULFATE 2 MG: 2 INJECTION, SOLUTION INTRAMUSCULAR; INTRAVENOUS at 11:04

## 2018-09-13 RX ADMIN — ALBUMIN, HUMAN INJ 5% 25 G: 5 SOLUTION at 11:43

## 2018-09-13 RX ADMIN — FENTANYL CITRATE 50 MCG: 50 INJECTION, SOLUTION INTRAMUSCULAR; INTRAVENOUS at 07:04

## 2018-09-13 RX ADMIN — ALBUMIN, HUMAN INJ 5% 25 G: 5 SOLUTION at 12:36

## 2018-09-13 RX ADMIN — PHENYLEPHRINE HYDROCHLORIDE 50 MCG: 10 INJECTION INTRAVENOUS at 06:40

## 2018-09-13 RX ADMIN — CISATRACURIUM BESYLATE 12 MG: 2 INJECTION INTRAVENOUS at 06:30

## 2018-09-13 RX ADMIN — PROPOFOL 70 MG: 10 INJECTION, EMULSION INTRAVENOUS at 06:30

## 2018-09-13 RX ADMIN — LIDOCAINE HYDROCHLORIDE 60 MG: 20 INJECTION, SOLUTION INTRAVENOUS at 06:30

## 2018-09-13 RX ADMIN — Medication 10 ML: at 11:47

## 2018-09-13 RX ADMIN — PHENYLEPHRINE HYDROCHLORIDE 80 MCG/MIN: 10 INJECTION INTRAMUSCULAR; INTRAVENOUS; SUBCUTANEOUS at 07:40

## 2018-09-13 RX ADMIN — METOCLOPRAMIDE 10 MG: 5 INJECTION, SOLUTION INTRAMUSCULAR; INTRAVENOUS at 23:33

## 2018-09-13 RX ADMIN — HYDRALAZINE HYDROCHLORIDE 10 MG: 20 INJECTION INTRAMUSCULAR; INTRAVENOUS at 10:40

## 2018-09-13 RX ADMIN — Medication 2 G: at 06:41

## 2018-09-13 RX ADMIN — ROPINIROLE HYDROCHLORIDE 0.5 MG: 0.5 TABLET, FILM COATED ORAL at 21:03

## 2018-09-13 RX ADMIN — FENTANYL CITRATE 50 MCG: 50 INJECTION, SOLUTION INTRAMUSCULAR; INTRAVENOUS at 06:45

## 2018-09-13 RX ADMIN — ALBUMIN (HUMAN) 25 G: 12.5 INJECTION, SOLUTION INTRAVENOUS at 11:43

## 2018-09-13 RX ADMIN — LORAZEPAM 1 MG: 1 TABLET ORAL at 21:03

## 2018-09-13 RX ADMIN — SODIUM CHLORIDE: 9 INJECTION, SOLUTION INTRAVENOUS at 05:47

## 2018-09-13 RX ADMIN — HYDRALAZINE HYDROCHLORIDE 10 MG: 10 TABLET, FILM COATED ORAL at 21:03

## 2018-09-13 RX ADMIN — ONDANSETRON 4 MG: 2 INJECTION INTRAMUSCULAR; INTRAVENOUS at 06:40

## 2018-09-13 RX ADMIN — ALBUMIN (HUMAN) 25 G: 12.5 INJECTION, SOLUTION INTRAVENOUS at 12:36

## 2018-09-13 RX ADMIN — SODIUM CHLORIDE: 9 INJECTION, SOLUTION INTRAVENOUS at 23:32

## 2018-09-13 RX ADMIN — CISATRACURIUM BESYLATE 4 MG: 2 INJECTION INTRAVENOUS at 07:45

## 2018-09-13 RX ADMIN — DEXAMETHASONE SODIUM PHOSPHATE 10 MG: 10 INJECTION INTRAMUSCULAR; INTRAVENOUS at 06:40

## 2018-09-13 RX ADMIN — CEFAZOLIN SODIUM 2 G: 10 POWDER, FOR SOLUTION INTRAVENOUS at 20:28

## 2018-09-13 RX ADMIN — CYCLOBENZAPRINE 10 MG: 10 TABLET, FILM COATED ORAL at 21:03

## 2018-09-13 RX ADMIN — MORPHINE SULFATE 2 MG: 2 INJECTION, SOLUTION INTRAMUSCULAR; INTRAVENOUS at 12:05

## 2018-09-13 RX ADMIN — PHENYLEPHRINE HYDROCHLORIDE 50 MCG: 10 INJECTION INTRAVENOUS at 06:45

## 2018-09-13 RX ADMIN — NEOSTIGMINE METHYLSULFATE 3 MG: 0.5 INJECTION INTRAVENOUS at 09:00

## 2018-09-13 RX ADMIN — SODIUM CHLORIDE: 9 INJECTION, SOLUTION INTRAVENOUS at 08:57

## 2018-09-13 RX ADMIN — LACTULOSE 20 G: 20 SOLUTION ORAL at 21:04

## 2018-09-13 RX ADMIN — METOCLOPRAMIDE 10 MG: 5 INJECTION, SOLUTION INTRAMUSCULAR; INTRAVENOUS at 20:54

## 2018-09-13 RX ADMIN — SODIUM CHLORIDE 250 ML: 9 INJECTION, SOLUTION INTRAVENOUS at 14:30

## 2018-09-13 RX ADMIN — ESCITALOPRAM OXALATE 5 MG: 10 TABLET, FILM COATED ORAL at 20:53

## 2018-09-13 RX ADMIN — HYDROCHLOROTHIAZIDE 6.25 MG: 12.5 TABLET ORAL at 20:53

## 2018-09-13 ASSESSMENT — PULMONARY FUNCTION TESTS
PIF_VALUE: 25
PIF_VALUE: 21
PIF_VALUE: 21
PIF_VALUE: 25
PIF_VALUE: 24
PIF_VALUE: 23
PIF_VALUE: 21
PIF_VALUE: 20
PIF_VALUE: 17
PIF_VALUE: 21
PIF_VALUE: 21
PIF_VALUE: 23
PIF_VALUE: 22
PIF_VALUE: 24
PIF_VALUE: 21
PIF_VALUE: 22
PIF_VALUE: 24
PIF_VALUE: 23
PIF_VALUE: 3
PIF_VALUE: 23
PIF_VALUE: 22
PIF_VALUE: 24
PIF_VALUE: 23
PIF_VALUE: 21
PIF_VALUE: 5
PIF_VALUE: 22
PIF_VALUE: 22
PIF_VALUE: 25
PIF_VALUE: 22
PIF_VALUE: 21
PIF_VALUE: 23
PIF_VALUE: 23
PIF_VALUE: 24
PIF_VALUE: 17
PIF_VALUE: 22
PIF_VALUE: 22
PIF_VALUE: 21
PIF_VALUE: 22
PIF_VALUE: 21
PIF_VALUE: 22
PIF_VALUE: 23
PIF_VALUE: 23
PIF_VALUE: 25
PIF_VALUE: 24
PIF_VALUE: 25
PIF_VALUE: 21
PIF_VALUE: 22
PIF_VALUE: 23
PIF_VALUE: 20
PIF_VALUE: 23
PIF_VALUE: 24
PIF_VALUE: 23
PIF_VALUE: 23
PIF_VALUE: 18
PIF_VALUE: 21
PIF_VALUE: 22
PIF_VALUE: 24
PIF_VALUE: 22
PIF_VALUE: 23
PIF_VALUE: 24
PIF_VALUE: 21
PIF_VALUE: 22
PIF_VALUE: 22
PIF_VALUE: 13
PIF_VALUE: 23
PIF_VALUE: 17
PIF_VALUE: 24
PIF_VALUE: 23
PIF_VALUE: 24
PIF_VALUE: 22
PIF_VALUE: 24
PIF_VALUE: 22
PIF_VALUE: 24
PIF_VALUE: 22
PIF_VALUE: 22
PIF_VALUE: 25
PIF_VALUE: 17
PIF_VALUE: 24
PIF_VALUE: 22
PIF_VALUE: 17
PIF_VALUE: 25
PIF_VALUE: 22
PIF_VALUE: 25
PIF_VALUE: 25
PIF_VALUE: 23
PIF_VALUE: 23
PIF_VALUE: 17
PIF_VALUE: 24
PIF_VALUE: 21
PIF_VALUE: 25
PIF_VALUE: 27
PIF_VALUE: 24
PIF_VALUE: 17
PIF_VALUE: 21
PIF_VALUE: 5
PIF_VALUE: 22
PIF_VALUE: 17
PIF_VALUE: 21
PIF_VALUE: 22
PIF_VALUE: 24
PIF_VALUE: 21
PIF_VALUE: 21
PIF_VALUE: 17
PIF_VALUE: 21
PIF_VALUE: 17
PIF_VALUE: 17
PIF_VALUE: 21
PIF_VALUE: 16
PIF_VALUE: 20
PIF_VALUE: 23
PIF_VALUE: 22
PIF_VALUE: 20
PIF_VALUE: 22
PIF_VALUE: 17
PIF_VALUE: 22
PIF_VALUE: 22
PIF_VALUE: 23
PIF_VALUE: 21
PIF_VALUE: 25
PIF_VALUE: 22
PIF_VALUE: 22
PIF_VALUE: 24
PIF_VALUE: 20
PIF_VALUE: 3
PIF_VALUE: 25
PIF_VALUE: 17
PIF_VALUE: 26
PIF_VALUE: 24
PIF_VALUE: 22
PIF_VALUE: 25
PIF_VALUE: 20
PIF_VALUE: 16
PIF_VALUE: 23
PIF_VALUE: 24
PIF_VALUE: 17
PIF_VALUE: 24
PIF_VALUE: 22
PIF_VALUE: 22
PIF_VALUE: 23
PIF_VALUE: 24
PIF_VALUE: 25
PIF_VALUE: 21
PIF_VALUE: 22
PIF_VALUE: 23
PIF_VALUE: 24
PIF_VALUE: 21
PIF_VALUE: 23
PIF_VALUE: 23
PIF_VALUE: 22
PIF_VALUE: 22
PIF_VALUE: 24
PIF_VALUE: 24
PIF_VALUE: 23
PIF_VALUE: 22
PIF_VALUE: 22
PIF_VALUE: 25
PIF_VALUE: 23
PIF_VALUE: 22
PIF_VALUE: 24
PIF_VALUE: 23
PIF_VALUE: 24
PIF_VALUE: 24
PIF_VALUE: 23
PIF_VALUE: 22
PIF_VALUE: 3
PIF_VALUE: 19
PIF_VALUE: 21
PIF_VALUE: 22
PIF_VALUE: 23
PIF_VALUE: 24
PIF_VALUE: 24
PIF_VALUE: 22
PIF_VALUE: 23
PIF_VALUE: 24
PIF_VALUE: 22
PIF_VALUE: 21
PIF_VALUE: 23
PIF_VALUE: 21
PIF_VALUE: 21
PIF_VALUE: 2
PIF_VALUE: 23
PIF_VALUE: 23
PIF_VALUE: 22
PIF_VALUE: 21
PIF_VALUE: 17
PIF_VALUE: 16
PIF_VALUE: 23
PIF_VALUE: 23
PIF_VALUE: 3
PIF_VALUE: 21
PIF_VALUE: 23
PIF_VALUE: 22
PIF_VALUE: 17
PIF_VALUE: 22
PIF_VALUE: 17
PIF_VALUE: 23
PIF_VALUE: 17
PIF_VALUE: 24
PIF_VALUE: 24
PIF_VALUE: 23
PIF_VALUE: 24
PIF_VALUE: 17
PIF_VALUE: 25
PIF_VALUE: 24
PIF_VALUE: 17
PIF_VALUE: 22
PIF_VALUE: 24
PIF_VALUE: 22
PIF_VALUE: 24
PIF_VALUE: 21
PIF_VALUE: 22
PIF_VALUE: 21
PIF_VALUE: 22
PIF_VALUE: 22
PIF_VALUE: 24
PIF_VALUE: 22
PIF_VALUE: 24
PIF_VALUE: 22
PIF_VALUE: 24
PIF_VALUE: 23
PIF_VALUE: 21
PIF_VALUE: 11
PIF_VALUE: 22
PIF_VALUE: 16
PIF_VALUE: 22
PIF_VALUE: 20
PIF_VALUE: 24
PIF_VALUE: 5
PIF_VALUE: 23
PIF_VALUE: 20
PIF_VALUE: 18

## 2018-09-13 ASSESSMENT — PAIN DESCRIPTION - PAIN TYPE
TYPE: SURGICAL PAIN

## 2018-09-13 ASSESSMENT — PAIN DESCRIPTION - DESCRIPTORS
DESCRIPTORS: ACHING;DISCOMFORT
DESCRIPTORS: ACHING;DISCOMFORT
DESCRIPTORS: ACHING;CONSTANT;DISCOMFORT
DESCRIPTORS: ACHING;CONSTANT;SORE
DESCRIPTORS: ACHING;DISCOMFORT

## 2018-09-13 ASSESSMENT — PAIN SCALES - GENERAL
PAINLEVEL_OUTOF10: 8
PAINLEVEL_OUTOF10: 0
PAINLEVEL_OUTOF10: 8
PAINLEVEL_OUTOF10: 0
PAINLEVEL_OUTOF10: 0
PAINLEVEL_OUTOF10: 5
PAINLEVEL_OUTOF10: 8
PAINLEVEL_OUTOF10: 10
PAINLEVEL_OUTOF10: 0

## 2018-09-13 ASSESSMENT — PAIN - FUNCTIONAL ASSESSMENT: PAIN_FUNCTIONAL_ASSESSMENT: 0-10

## 2018-09-13 ASSESSMENT — PAIN DESCRIPTION - LOCATION
LOCATION: BACK

## 2018-09-13 ASSESSMENT — PAIN DESCRIPTION - ORIENTATION: ORIENTATION: UPPER

## 2018-09-14 LAB
ANION GAP SERPL CALCULATED.3IONS-SCNC: 13 MMOL/L (ref 7–16)
BUN BLDV-MCNC: 21 MG/DL (ref 8–23)
CALCIUM SERPL-MCNC: 8 MG/DL (ref 8.6–10.2)
CHLORIDE BLD-SCNC: 111 MMOL/L (ref 98–107)
CO2: 19 MMOL/L (ref 22–29)
CREAT SERPL-MCNC: 1.3 MG/DL (ref 0.5–1)
GFR AFRICAN AMERICAN: 47
GFR NON-AFRICAN AMERICAN: 39 ML/MIN/1.73
GLUCOSE BLD-MCNC: 120 MG/DL (ref 74–109)
HCT VFR BLD CALC: 28.5 % (ref 34–48)
HEMOGLOBIN: 9.4 G/DL (ref 11.5–15.5)
MCH RBC QN AUTO: 29.7 PG (ref 26–35)
MCHC RBC AUTO-ENTMCNC: 33 % (ref 32–34.5)
MCV RBC AUTO: 90.2 FL (ref 80–99.9)
PDW BLD-RTO: 17 FL (ref 11.5–15)
PLATELET # BLD: 137 E9/L (ref 130–450)
PMV BLD AUTO: 9.3 FL (ref 7–12)
POTASSIUM SERPL-SCNC: 4.2 MMOL/L (ref 3.5–5)
RBC # BLD: 3.16 E12/L (ref 3.5–5.5)
SODIUM BLD-SCNC: 143 MMOL/L (ref 132–146)
WBC # BLD: 11.1 E9/L (ref 4.5–11.5)

## 2018-09-14 PROCEDURE — 1200000000 HC SEMI PRIVATE

## 2018-09-14 PROCEDURE — G8987 SELF CARE CURRENT STATUS: HCPCS

## 2018-09-14 PROCEDURE — 97530 THERAPEUTIC ACTIVITIES: CPT

## 2018-09-14 PROCEDURE — 97535 SELF CARE MNGMENT TRAINING: CPT

## 2018-09-14 PROCEDURE — G8978 MOBILITY CURRENT STATUS: HCPCS

## 2018-09-14 PROCEDURE — 2580000003 HC RX 258: Performed by: NEUROLOGICAL SURGERY

## 2018-09-14 PROCEDURE — 6360000002 HC RX W HCPCS: Performed by: NEUROLOGICAL SURGERY

## 2018-09-14 PROCEDURE — 2580000003 HC RX 258: Performed by: PHYSICIAN ASSISTANT

## 2018-09-14 PROCEDURE — 97163 PT EVAL HIGH COMPLEX 45 MIN: CPT

## 2018-09-14 PROCEDURE — 85027 COMPLETE CBC AUTOMATED: CPT

## 2018-09-14 PROCEDURE — G8979 MOBILITY GOAL STATUS: HCPCS

## 2018-09-14 PROCEDURE — 97166 OT EVAL MOD COMPLEX 45 MIN: CPT

## 2018-09-14 PROCEDURE — G8988 SELF CARE GOAL STATUS: HCPCS

## 2018-09-14 PROCEDURE — 6370000000 HC RX 637 (ALT 250 FOR IP): Performed by: NEUROLOGICAL SURGERY

## 2018-09-14 PROCEDURE — 80048 BASIC METABOLIC PNL TOTAL CA: CPT

## 2018-09-14 PROCEDURE — 36415 COLL VENOUS BLD VENIPUNCTURE: CPT

## 2018-09-14 PROCEDURE — 2700000000 HC OXYGEN THERAPY PER DAY

## 2018-09-14 RX ORDER — 0.9 % SODIUM CHLORIDE 0.9 %
250 INTRAVENOUS SOLUTION INTRAVENOUS ONCE
Status: DISCONTINUED | OUTPATIENT
Start: 2018-09-14 | End: 2018-09-17 | Stop reason: HOSPADM

## 2018-09-14 RX ORDER — SODIUM CHLORIDE 0.9 % (FLUSH) 0.9 %
10 SYRINGE (ML) INJECTION EVERY 12 HOURS
Status: DISCONTINUED | OUTPATIENT
Start: 2018-09-14 | End: 2018-09-17 | Stop reason: HOSPADM

## 2018-09-14 RX ADMIN — HYDRALAZINE HYDROCHLORIDE 10 MG: 10 TABLET, FILM COATED ORAL at 16:12

## 2018-09-14 RX ADMIN — HYDROCODONE BITARTRATE AND ACETAMINOPHEN 2 TABLET: 5; 325 TABLET ORAL at 21:26

## 2018-09-14 RX ADMIN — CYCLOBENZAPRINE 10 MG: 10 TABLET, FILM COATED ORAL at 08:19

## 2018-09-14 RX ADMIN — CYCLOBENZAPRINE 10 MG: 10 TABLET, FILM COATED ORAL at 07:24

## 2018-09-14 RX ADMIN — LACTULOSE 20 G: 20 SOLUTION ORAL at 08:18

## 2018-09-14 RX ADMIN — CYCLOBENZAPRINE 10 MG: 10 TABLET, FILM COATED ORAL at 21:14

## 2018-09-14 RX ADMIN — LACTULOSE 20 G: 20 SOLUTION ORAL at 13:10

## 2018-09-14 RX ADMIN — METOCLOPRAMIDE 10 MG: 5 INJECTION, SOLUTION INTRAMUSCULAR; INTRAVENOUS at 17:31

## 2018-09-14 RX ADMIN — METOCLOPRAMIDE 10 MG: 5 INJECTION, SOLUTION INTRAMUSCULAR; INTRAVENOUS at 11:41

## 2018-09-14 RX ADMIN — ESCITALOPRAM OXALATE 5 MG: 10 TABLET, FILM COATED ORAL at 08:19

## 2018-09-14 RX ADMIN — ROPINIROLE HYDROCHLORIDE 0.5 MG: 0.5 TABLET, FILM COATED ORAL at 21:15

## 2018-09-14 RX ADMIN — Medication 10 ML: at 21:29

## 2018-09-14 RX ADMIN — HYDROCHLOROTHIAZIDE 6.25 MG: 12.5 TABLET ORAL at 08:19

## 2018-09-14 RX ADMIN — PRAVASTATIN SODIUM 20 MG: 20 TABLET ORAL at 21:14

## 2018-09-14 RX ADMIN — LABETALOL HYDROCHLORIDE 100 MG: 100 TABLET, FILM COATED ORAL at 21:14

## 2018-09-14 RX ADMIN — HYDRALAZINE HYDROCHLORIDE 10 MG: 10 TABLET, FILM COATED ORAL at 12:00

## 2018-09-14 RX ADMIN — METOCLOPRAMIDE 10 MG: 5 INJECTION, SOLUTION INTRAMUSCULAR; INTRAVENOUS at 06:07

## 2018-09-14 RX ADMIN — CEFAZOLIN SODIUM 2 G: 10 POWDER, FOR SOLUTION INTRAVENOUS at 18:39

## 2018-09-14 RX ADMIN — ACETAMINOPHEN 650 MG: 325 TABLET, FILM COATED ORAL at 15:58

## 2018-09-14 RX ADMIN — CEFAZOLIN SODIUM 2 G: 10 POWDER, FOR SOLUTION INTRAVENOUS at 02:26

## 2018-09-14 RX ADMIN — HYDRALAZINE HYDROCHLORIDE 10 MG: 10 TABLET, FILM COATED ORAL at 08:20

## 2018-09-14 RX ADMIN — CYCLOBENZAPRINE 10 MG: 10 TABLET, FILM COATED ORAL at 17:40

## 2018-09-14 RX ADMIN — CEFAZOLIN SODIUM 2 G: 10 POWDER, FOR SOLUTION INTRAVENOUS at 10:14

## 2018-09-14 RX ADMIN — LABETALOL HYDROCHLORIDE 100 MG: 100 TABLET, FILM COATED ORAL at 08:19

## 2018-09-14 RX ADMIN — Medication 10 ML: at 06:07

## 2018-09-14 RX ADMIN — HYDROCODONE BITARTRATE AND ACETAMINOPHEN 1 TABLET: 5; 325 TABLET ORAL at 07:24

## 2018-09-14 RX ADMIN — LORAZEPAM 1 MG: 1 TABLET ORAL at 21:25

## 2018-09-14 ASSESSMENT — PAIN DESCRIPTION - PAIN TYPE
TYPE: ACUTE PAIN
TYPE: SURGICAL PAIN

## 2018-09-14 ASSESSMENT — PAIN SCALES - PAIN ASSESSMENT IN ADVANCED DEMENTIA (PAINAD)
BODYLANGUAGE: 0
BREATHING: 0
TOTALSCORE: 4
BREATHING: 0
NEGVOCALIZATION: 1
TOTALSCORE: 0
FACIALEXPRESSION: 1
CONSOLABILITY: 0
BODYLANGUAGE: 1
CONSOLABILITY: 1
FACIALEXPRESSION: 0
NEGVOCALIZATION: 0

## 2018-09-14 ASSESSMENT — PAIN DESCRIPTION - DESCRIPTORS
DESCRIPTORS: ACHING;DISCOMFORT;SORE;SPASM
DESCRIPTORS: ACHING;SHARP;SHOOTING

## 2018-09-14 ASSESSMENT — PAIN SCALES - GENERAL
PAINLEVEL_OUTOF10: 4
PAINLEVEL_OUTOF10: 10
PAINLEVEL_OUTOF10: 0
PAINLEVEL_OUTOF10: 2
PAINLEVEL_OUTOF10: 0

## 2018-09-14 ASSESSMENT — PAIN DESCRIPTION - ORIENTATION
ORIENTATION: UPPER
ORIENTATION: LOWER

## 2018-09-14 ASSESSMENT — PAIN DESCRIPTION - LOCATION
LOCATION: BACK
LOCATION: BACK

## 2018-09-14 ASSESSMENT — PAIN DESCRIPTION - FREQUENCY: FREQUENCY: CONTINUOUS

## 2018-09-14 NOTE — PROGRESS NOTES
Occupational Therapy          OT consult received and appreciated. Chart reviewed. Will hold evaluation due to LSO brace ordered . Will evaluate at a later time after brace arrives. Thank you. Nneka Sanchez.  Dorothy 72, Daily 70

## 2018-09-14 NOTE — OP NOTE
transverse  processes bilaterally at L3, L4, and L5 and also the sacral ala. I  irrigated the wound copiously with antibiotic-impregnated saline. I then  lay down my bone grafting material in the lateral gutters, which consisted  of locally harvested autograft plus recombinant BMP-2 (INFUSE). Once this  was done, adequate hemostasis was obtained with both monopolar and bipolar  cautery. A Hemovac drain was placed into the wound. I proceeded to then  close the wound in layers using 0 Vicryl for the fascia, 2-0 Vicryl for the  subcutaneous layer, and 4-0 Monocryl in a subcuticular fashion for the  skin. Dermabond was applied on the skin surface. A dry sterile dressing  was placed over this. The patient was then flipped into supine position on  her hospital bed, was extubated and transported to the postanesthesia care  unit in stable condition. There were no complications. Counts were  correct. I was present for the entire case.         Lilibeth Middleton MD    D: 09/13/2018 17:11:46       T: 09/14/2018 0:14:37     [email or web address]  Job#: [id number]     Doc#: [id number]    CC:

## 2018-09-14 NOTE — ANESTHESIA POSTPROCEDURE EVALUATION
Department of Anesthesiology  Postprocedure Note    Patient: Rosanna Sheffield  MRN: [id number]  YOB: 1935  Date of evaluation: 9/14/2018  Time:  2:21 PM     Procedure Summary     Date:  09/13/18 Room / Location:  [facility]    Anesthesia Start:  0621 Anesthesia Stop:  1015    Procedure:  L3-S1 FUSION, L4-L5, L5-S1  TRANSFORAMINAL LUMBAR INTERBODY FUSION -- NEEDS CAGES, PLATES, SCREWS, O-ARM, AUDIOLOGY, CHIARA TABLE, CELL SAVER, PLATELET GEL - [employer] (N/A ) Diagnosis:  (SPOONDYLOLISTHESIS)    Surgeon:  Osman Evans MD Responsible Provider:  Jayleen Higgins MD    Anesthesia Type:  general ASA Status:  3          Anesthesia Type: general    Júnior Phase I: Júnior Score: 9    Júnior Phase II:      Last vitals: Reviewed and per EMR flowsheets.        Anesthesia Post Evaluation    Patient location during evaluation: PACU  Patient participation: complete - patient participated  Level of consciousness: awake and alert  Airway patency: patent  Nausea & Vomiting: no nausea and no vomiting  Complications: no  Cardiovascular status: hemodynamically stable and blood pressure returned to baseline  Respiratory status: acceptable  Hydration status: euvolemic

## 2018-09-14 NOTE — PROGRESS NOTES
chloride flush 0.9 % injection 10 mL, 10 mL, Intravenous, 2 times per day  sodium chloride flush 0.9 % injection 10 mL, 10 mL, Intravenous, PRN  acetaminophen (TYLENOL) tablet 650 mg, 650 mg, Oral, Q4H PRN  ondansetron (ZOFRAN) injection 4 mg, 4 mg, Intravenous, Q6H PRN  ceFAZolin (ANCEF) 2 g in dextrose 5 % 50 mL IVPB, 2 g, Intravenous, Q8H  HYDROcodone-acetaminophen (NORCO) 5-325 MG per tablet 1 tablet, 1 tablet, Oral, Q4H PRN **OR** HYDROcodone-acetaminophen (NORCO) 5-325 MG per tablet 2 tablet, 2 tablet, Oral, Q4H PRN  morphine (PF) injection 2 mg, 2 mg, Intravenous, Q2H PRN **OR** morphine injection 4 mg, 4 mg, Intravenous, Q2H PRN  cyclobenzaprine (FLEXERIL) tablet 10 mg, 10 mg, Oral, TID PRN  sennosides-docusate sodium (SENOKOT-S) 8.6-50 MG tablet 2 tablet, 2 tablet, Oral, Daily PRN  metoclopramide (REGLAN) injection 10 mg, 10 mg, Intravenous, Q6H  lactulose (CHRONULAC) 10 GM/15ML solution 20 g, 20 g, Oral, TID  bisacodyl (DULCOLAX) suppository 10 mg, 10 mg, Rectal, Daily PRN    ASSESSMENT:   · 81year old female s/p L3-S1 PLIF on 9/13/18 - stable  · Acute blood loss anemia    PLAN:  · tx 2 units of PRBCs  · PT/OT  · WBAT with brace  · Pain control  · D/c drain Sunday       Electronically signed by NAT Foley on 9/14/2018 at 8:18 AM

## 2018-09-14 NOTE — CARE COORDINATION
9/14/2018 social work transition of care/discharge planning  Sw Followed up with [employer]. They need to see therapy evals to determine if pt appropriate for Acute rehab. Therapy waiting for pt to obtain brace. Sw will follow and assist prn.   Electronically signed by RODRIGUEZ Arana on 9/14/2018 at 2:08 PM

## 2018-09-15 PROBLEM — D62 ACUTE BLOOD LOSS ANEMIA: Status: ACTIVE | Noted: 2018-09-15

## 2018-09-15 PROBLEM — N17.9 AKI (ACUTE KIDNEY INJURY) (HCC): Status: ACTIVE | Noted: 2018-09-15

## 2018-09-15 PROCEDURE — 1200000000 HC SEMI PRIVATE

## 2018-09-15 PROCEDURE — 2580000003 HC RX 258: Performed by: NEUROLOGICAL SURGERY

## 2018-09-15 PROCEDURE — 2580000003 HC RX 258: Performed by: PHYSICIAN ASSISTANT

## 2018-09-15 PROCEDURE — 2700000000 HC OXYGEN THERAPY PER DAY

## 2018-09-15 PROCEDURE — 6360000002 HC RX W HCPCS: Performed by: NEUROLOGICAL SURGERY

## 2018-09-15 PROCEDURE — 6370000000 HC RX 637 (ALT 250 FOR IP): Performed by: NEUROLOGICAL SURGERY

## 2018-09-15 PROCEDURE — 99024 POSTOP FOLLOW-UP VISIT: CPT | Performed by: PHYSICIAN ASSISTANT

## 2018-09-15 RX ADMIN — PRAVASTATIN SODIUM 20 MG: 20 TABLET ORAL at 21:11

## 2018-09-15 RX ADMIN — SODIUM CHLORIDE: 9 INJECTION, SOLUTION INTRAVENOUS at 10:32

## 2018-09-15 RX ADMIN — CYCLOBENZAPRINE 10 MG: 10 TABLET, FILM COATED ORAL at 09:23

## 2018-09-15 RX ADMIN — HYDRALAZINE HYDROCHLORIDE 10 MG: 10 TABLET, FILM COATED ORAL at 22:22

## 2018-09-15 RX ADMIN — HYDROCHLOROTHIAZIDE 6.25 MG: 12.5 TABLET ORAL at 09:16

## 2018-09-15 RX ADMIN — Medication 10 ML: at 00:13

## 2018-09-15 RX ADMIN — LACTULOSE 20 G: 20 SOLUTION ORAL at 21:10

## 2018-09-15 RX ADMIN — ROPINIROLE HYDROCHLORIDE 0.5 MG: 0.5 TABLET, FILM COATED ORAL at 21:10

## 2018-09-15 RX ADMIN — HYDRALAZINE HYDROCHLORIDE 10 MG: 10 TABLET, FILM COATED ORAL at 17:50

## 2018-09-15 RX ADMIN — HYDRALAZINE HYDROCHLORIDE 10 MG: 10 TABLET, FILM COATED ORAL at 09:17

## 2018-09-15 RX ADMIN — CEFAZOLIN SODIUM 2 G: 10 POWDER, FOR SOLUTION INTRAVENOUS at 18:45

## 2018-09-15 RX ADMIN — CEFAZOLIN SODIUM 2 G: 10 POWDER, FOR SOLUTION INTRAVENOUS at 10:35

## 2018-09-15 RX ADMIN — LABETALOL HYDROCHLORIDE 100 MG: 100 TABLET, FILM COATED ORAL at 09:16

## 2018-09-15 RX ADMIN — ESCITALOPRAM OXALATE 5 MG: 10 TABLET, FILM COATED ORAL at 09:17

## 2018-09-15 RX ADMIN — Medication 10 ML: at 09:18

## 2018-09-15 RX ADMIN — LABETALOL HYDROCHLORIDE 100 MG: 100 TABLET, FILM COATED ORAL at 21:10

## 2018-09-15 RX ADMIN — METOCLOPRAMIDE 10 MG: 5 INJECTION, SOLUTION INTRAMUSCULAR; INTRAVENOUS at 06:46

## 2018-09-15 RX ADMIN — CEFAZOLIN SODIUM 2 G: 10 POWDER, FOR SOLUTION INTRAVENOUS at 02:55

## 2018-09-15 RX ADMIN — HYDRALAZINE HYDROCHLORIDE 10 MG: 10 TABLET, FILM COATED ORAL at 12:55

## 2018-09-15 RX ADMIN — METOCLOPRAMIDE 10 MG: 5 INJECTION, SOLUTION INTRAMUSCULAR; INTRAVENOUS at 18:45

## 2018-09-15 RX ADMIN — Medication 10 ML: at 06:46

## 2018-09-15 RX ADMIN — Medication 10 ML: at 21:11

## 2018-09-15 RX ADMIN — METOCLOPRAMIDE 10 MG: 5 INJECTION, SOLUTION INTRAMUSCULAR; INTRAVENOUS at 00:13

## 2018-09-15 RX ADMIN — HYDROCODONE BITARTRATE AND ACETAMINOPHEN 1 TABLET: 5; 325 TABLET ORAL at 18:16

## 2018-09-15 RX ADMIN — METOCLOPRAMIDE 10 MG: 5 INJECTION, SOLUTION INTRAMUSCULAR; INTRAVENOUS at 12:55

## 2018-09-15 RX ADMIN — SODIUM CHLORIDE: 9 INJECTION, SOLUTION INTRAVENOUS at 22:22

## 2018-09-15 RX ADMIN — HYDROCODONE BITARTRATE AND ACETAMINOPHEN 2 TABLET: 5; 325 TABLET ORAL at 09:23

## 2018-09-15 RX ADMIN — HYDROCODONE BITARTRATE AND ACETAMINOPHEN 2 TABLET: 5; 325 TABLET ORAL at 02:55

## 2018-09-15 RX ADMIN — METOCLOPRAMIDE 10 MG: 5 INJECTION, SOLUTION INTRAMUSCULAR; INTRAVENOUS at 23:57

## 2018-09-15 ASSESSMENT — PAIN SCALES - GENERAL
PAINLEVEL_OUTOF10: 10
PAINLEVEL_OUTOF10: 4
PAINLEVEL_OUTOF10: 0
PAINLEVEL_OUTOF10: 9
PAINLEVEL_OUTOF10: 7
PAINLEVEL_OUTOF10: 3

## 2018-09-15 ASSESSMENT — PAIN DESCRIPTION - FREQUENCY: FREQUENCY: CONTINUOUS

## 2018-09-15 ASSESSMENT — PAIN DESCRIPTION - PAIN TYPE
TYPE: SURGICAL PAIN
TYPE: ACUTE PAIN;SURGICAL PAIN
TYPE: ACUTE PAIN

## 2018-09-15 ASSESSMENT — PAIN DESCRIPTION - DESCRIPTORS
DESCRIPTORS: ACHING;DULL;DISCOMFORT
DESCRIPTORS: SHARP;ACHING
DESCRIPTORS: ACHING;SHARP;SORE

## 2018-09-15 ASSESSMENT — PAIN DESCRIPTION - LOCATION
LOCATION: BACK

## 2018-09-15 ASSESSMENT — PAIN DESCRIPTION - ORIENTATION: ORIENTATION: LOWER;LEFT

## 2018-09-15 ASSESSMENT — PAIN DESCRIPTION - ONSET: ONSET: ON-GOING

## 2018-09-15 NOTE — PROGRESS NOTES
Department of Neurosurgery  Progress Note    CHIEF COMPLAINT: pt s/p lumbar fusion    SUBJECTIVE:  Awake and alert. Mild back pain. Drain output 130 mL/24hrs. No new issues overnight. REVIEW OF SYSTEMS :  Constitutional: Negative for chills and fever. Neurological: Negative for dizziness, tremors and speech change. OBJECTIVE:   VITALS:  BP (!) 181/97   Pulse 97   Temp 98.9 °F (37.2 °C) (Temporal)   Resp 16   Ht 5' (1.524 m)   Wt 128 lb (58.1 kg)   SpO2 94%   BMI 25.00 kg/m²     PHYSICAL:  CONSTITUTIONAL:  awake, alert, cooperative, no apparent distress, and appears stated age. WRIGHT. FC.   Drain intact    DATA:  CBC:   Lab Results   Component Value Date    WBC 11.1 09/14/2018    RBC 3.16 09/14/2018    HGB 9.4 09/14/2018    HCT 28.5 09/14/2018    MCV 90.2 09/14/2018    MCH 29.7 09/14/2018    MCHC 33.0 09/14/2018    RDW 17.0 09/14/2018     09/14/2018    MPV 9.3 09/14/2018     BMP:    Lab Results   Component Value Date     09/14/2018    K 4.2 09/14/2018    K 4.7 09/10/2018     09/14/2018    CO2 19 09/14/2018    BUN 21 09/14/2018    LABALBU 4.2 06/15/2018    LABALBU 4.0 06/04/2012    CREATININE 1.3 09/14/2018    CALCIUM 8.0 09/14/2018    GFRAA 47 09/14/2018    LABGLOM 39 09/14/2018    GLUCOSE 120 09/14/2018    GLUCOSE 76 06/04/2012     PT/INR:    Lab Results   Component Value Date    PROTIME 11.1 09/10/2018    INR 1.0 09/10/2018     PTT:  No results found for: APTT, PTT[APTT}    Current Inpatient Medications  Current Facility-Administered Medications: 0.9 % sodium chloride bolus, 250 mL, Intravenous, Once  sodium chloride flush 0.9 % injection 10 mL, 10 mL, Intravenous, Q12H  0.9 % sodium chloride infusion, , Intravenous, Continuous  escitalopram (LEXAPRO) tablet 5 mg, 5 mg, Oral, Daily  hydrALAZINE (APRESOLINE) tablet 10 mg, 10 mg, Oral, 4x Daily  hydrochlorothiazide (HYDRODIURIL) tablet 6.25 mg, 6.25 mg, Oral, Daily  labetalol (NORMODYNE) tablet 100 mg, 100 mg, Oral, BID  LORazepam

## 2018-09-16 LAB
ANION GAP SERPL CALCULATED.3IONS-SCNC: 17 MMOL/L (ref 7–16)
BUN BLDV-MCNC: 16 MG/DL (ref 8–23)
CALCIUM SERPL-MCNC: 8.5 MG/DL (ref 8.6–10.2)
CHLORIDE BLD-SCNC: 104 MMOL/L (ref 98–107)
CO2: 19 MMOL/L (ref 22–29)
CREAT SERPL-MCNC: 1.1 MG/DL (ref 0.5–1)
GFR AFRICAN AMERICAN: 57
GFR NON-AFRICAN AMERICAN: 47 ML/MIN/1.73
GLUCOSE BLD-MCNC: 111 MG/DL (ref 74–109)
HCT VFR BLD CALC: 32.2 % (ref 34–48)
HEMOGLOBIN: 10.7 G/DL (ref 11.5–15.5)
MCH RBC QN AUTO: 29.6 PG (ref 26–35)
MCHC RBC AUTO-ENTMCNC: 33.2 % (ref 32–34.5)
MCV RBC AUTO: 89.2 FL (ref 80–99.9)
PDW BLD-RTO: 16.7 FL (ref 11.5–15)
PLATELET # BLD: 156 E9/L (ref 130–450)
PMV BLD AUTO: 9.1 FL (ref 7–12)
POTASSIUM REFLEX MAGNESIUM: 4 MMOL/L (ref 3.5–5)
RBC # BLD: 3.61 E12/L (ref 3.5–5.5)
SODIUM BLD-SCNC: 140 MMOL/L (ref 132–146)
WBC # BLD: 12.3 E9/L (ref 4.5–11.5)

## 2018-09-16 PROCEDURE — 51798 US URINE CAPACITY MEASURE: CPT

## 2018-09-16 PROCEDURE — 6360000002 HC RX W HCPCS: Performed by: NEUROLOGICAL SURGERY

## 2018-09-16 PROCEDURE — 1200000000 HC SEMI PRIVATE

## 2018-09-16 PROCEDURE — 6360000002 HC RX W HCPCS: Performed by: CLINICAL NURSE SPECIALIST

## 2018-09-16 PROCEDURE — L0627 LO SAG RI AN/POS PNL PRE CST: HCPCS

## 2018-09-16 PROCEDURE — 2700000000 HC OXYGEN THERAPY PER DAY

## 2018-09-16 PROCEDURE — 99024 POSTOP FOLLOW-UP VISIT: CPT | Performed by: PHYSICIAN ASSISTANT

## 2018-09-16 PROCEDURE — 36415 COLL VENOUS BLD VENIPUNCTURE: CPT

## 2018-09-16 PROCEDURE — 80048 BASIC METABOLIC PNL TOTAL CA: CPT

## 2018-09-16 PROCEDURE — 97530 THERAPEUTIC ACTIVITIES: CPT

## 2018-09-16 PROCEDURE — 2580000003 HC RX 258: Performed by: PHYSICIAN ASSISTANT

## 2018-09-16 PROCEDURE — 6370000000 HC RX 637 (ALT 250 FOR IP): Performed by: NEUROLOGICAL SURGERY

## 2018-09-16 PROCEDURE — 2580000003 HC RX 258: Performed by: NEUROLOGICAL SURGERY

## 2018-09-16 PROCEDURE — 85027 COMPLETE CBC AUTOMATED: CPT

## 2018-09-16 RX ORDER — HYDRALAZINE HYDROCHLORIDE 20 MG/ML
10 INJECTION INTRAMUSCULAR; INTRAVENOUS EVERY 6 HOURS PRN
Status: DISCONTINUED | OUTPATIENT
Start: 2018-09-16 | End: 2018-09-17 | Stop reason: HOSPADM

## 2018-09-16 RX ORDER — HYDRALAZINE HYDROCHLORIDE 20 MG/ML
10 INJECTION INTRAMUSCULAR; INTRAVENOUS ONCE
Status: COMPLETED | OUTPATIENT
Start: 2018-09-16 | End: 2018-09-16

## 2018-09-16 RX ADMIN — HYDRALAZINE HYDROCHLORIDE 10 MG: 10 TABLET, FILM COATED ORAL at 20:59

## 2018-09-16 RX ADMIN — LACTULOSE 20 G: 20 SOLUTION ORAL at 08:13

## 2018-09-16 RX ADMIN — HYDRALAZINE HYDROCHLORIDE 10 MG: 10 TABLET, FILM COATED ORAL at 13:50

## 2018-09-16 RX ADMIN — LABETALOL HYDROCHLORIDE 100 MG: 100 TABLET, FILM COATED ORAL at 08:12

## 2018-09-16 RX ADMIN — Medication 10 ML: at 20:59

## 2018-09-16 RX ADMIN — HYDROCODONE BITARTRATE AND ACETAMINOPHEN 1 TABLET: 5; 325 TABLET ORAL at 00:03

## 2018-09-16 RX ADMIN — HYDROCODONE BITARTRATE AND ACETAMINOPHEN 2 TABLET: 5; 325 TABLET ORAL at 12:02

## 2018-09-16 RX ADMIN — CEFAZOLIN SODIUM 2 G: 10 POWDER, FOR SOLUTION INTRAVENOUS at 11:26

## 2018-09-16 RX ADMIN — METOCLOPRAMIDE 10 MG: 5 INJECTION, SOLUTION INTRAMUSCULAR; INTRAVENOUS at 06:34

## 2018-09-16 RX ADMIN — LORAZEPAM 1 MG: 1 TABLET ORAL at 22:20

## 2018-09-16 RX ADMIN — ROPINIROLE HYDROCHLORIDE 0.5 MG: 0.5 TABLET, FILM COATED ORAL at 21:00

## 2018-09-16 RX ADMIN — HYDROCODONE BITARTRATE AND ACETAMINOPHEN 2 TABLET: 5; 325 TABLET ORAL at 20:29

## 2018-09-16 RX ADMIN — CEFAZOLIN SODIUM 2 G: 10 POWDER, FOR SOLUTION INTRAVENOUS at 02:56

## 2018-09-16 RX ADMIN — CYCLOBENZAPRINE 10 MG: 10 TABLET, FILM COATED ORAL at 20:59

## 2018-09-16 RX ADMIN — PRAVASTATIN SODIUM 20 MG: 20 TABLET ORAL at 20:59

## 2018-09-16 RX ADMIN — HYDRALAZINE HYDROCHLORIDE 10 MG: 10 TABLET, FILM COATED ORAL at 08:12

## 2018-09-16 RX ADMIN — LABETALOL HYDROCHLORIDE 100 MG: 100 TABLET, FILM COATED ORAL at 20:59

## 2018-09-16 RX ADMIN — HYDROCHLOROTHIAZIDE 6.25 MG: 12.5 TABLET ORAL at 08:13

## 2018-09-16 RX ADMIN — SODIUM CHLORIDE: 9 INJECTION, SOLUTION INTRAVENOUS at 09:50

## 2018-09-16 RX ADMIN — ESCITALOPRAM OXALATE 5 MG: 10 TABLET, FILM COATED ORAL at 08:12

## 2018-09-16 RX ADMIN — HYDRALAZINE HYDROCHLORIDE 10 MG: 20 INJECTION INTRAMUSCULAR; INTRAVENOUS at 11:40

## 2018-09-16 RX ADMIN — HYDRALAZINE HYDROCHLORIDE 10 MG: 10 TABLET, FILM COATED ORAL at 16:02

## 2018-09-16 RX ADMIN — SODIUM CHLORIDE: 9 INJECTION, SOLUTION INTRAVENOUS at 20:58

## 2018-09-16 ASSESSMENT — PAIN DESCRIPTION - LOCATION
LOCATION: BACK
LOCATION: BACK
LOCATION: BACK;ABDOMEN

## 2018-09-16 ASSESSMENT — PAIN DESCRIPTION - ORIENTATION
ORIENTATION: LOWER

## 2018-09-16 ASSESSMENT — PAIN DESCRIPTION - PAIN TYPE
TYPE: SURGICAL PAIN
TYPE: SURGICAL PAIN

## 2018-09-16 ASSESSMENT — PAIN SCALES - GENERAL
PAINLEVEL_OUTOF10: 0
PAINLEVEL_OUTOF10: 4
PAINLEVEL_OUTOF10: 4
PAINLEVEL_OUTOF10: 10
PAINLEVEL_OUTOF10: 8
PAINLEVEL_OUTOF10: 3

## 2018-09-16 ASSESSMENT — PAIN DESCRIPTION - DESCRIPTORS: DESCRIPTORS: CRAMPING;ACHING;SHOOTING

## 2018-09-16 NOTE — PROGRESS NOTES
loss anemia [D62] 09/15/2018    BABS (acute kidney injury) ([facility] [address] [N17.9] 09/15/2018    Lumbar disc herniation [M51.26] 09/13/2018     BABS  - monitor labs  - stable    Abdominal pain  - CT abdomen  - low abdomen firm, tender worse with palpation  - bladder scan to be completed    HTN  - monitor BP  - resume home medications  - hydralazine PRN    Acute blood loss anemia  - monitor labs  - stable    HLD  - resume home medications    Lumbar disk herniation  - management per primary team      DVT Prophylaxis: SCDs  Diet: DIET GENERAL; No Added Salt (3-4 GM)  Code Status: Full Code    PT/OT Eval Status: active and ongoing    Dispo - per primary    AXEL Braga - CNS

## 2018-09-16 NOTE — PROGRESS NOTES
Pt complained of abdominal pain, high BP value obtained. Post void bladder resulted 948ml. order obtain for BP med and Lehman catheter insertion.

## 2018-09-16 NOTE — PROGRESS NOTES
Oral, Nightly  pravastatin (PRAVACHOL) tablet 20 mg, 20 mg, Oral, Daily  rOPINIRole (REQUIP) tablet 0.5 mg, 0.5 mg, Oral, Nightly  sodium chloride flush 0.9 % injection 10 mL, 10 mL, Intravenous, 2 times per day  sodium chloride flush 0.9 % injection 10 mL, 10 mL, Intravenous, PRN  acetaminophen (TYLENOL) tablet 650 mg, 650 mg, Oral, Q4H PRN  ondansetron (ZOFRAN) injection 4 mg, 4 mg, Intravenous, Q6H PRN  ceFAZolin (ANCEF) 2 g in dextrose 5 % 50 mL IVPB, 2 g, Intravenous, Q8H  HYDROcodone-acetaminophen (NORCO) 5-325 MG per tablet 1 tablet, 1 tablet, Oral, Q4H PRN **OR** HYDROcodone-acetaminophen (NORCO) 5-325 MG per tablet 2 tablet, 2 tablet, Oral, Q4H PRN  morphine (PF) injection 2 mg, 2 mg, Intravenous, Q2H PRN **OR** morphine injection 4 mg, 4 mg, Intravenous, Q2H PRN  cyclobenzaprine (FLEXERIL) tablet 10 mg, 10 mg, Oral, TID PRN  sennosides-docusate sodium (SENOKOT-S) 8.6-50 MG tablet 2 tablet, 2 tablet, Oral, Daily PRN  metoclopramide (REGLAN) injection 10 mg, 10 mg, Intravenous, Q6H  lactulose (CHRONULAC) 10 GM/15ML solution 20 g, 20 g, Oral, TID  bisacodyl (DULCOLAX) suppository 10 mg, 10 mg, Rectal, Daily PRN    ASSESSMENT:   · 81year old female s/p L3-S1 PLIF on 9/13/18 - stable  · Acute blood loss anemia    PLAN:  · PT/OT  · WBAT with brace  · Pain control  · D/c planning      Electronically signed by NAT Lopez on 9/16/2018 at 7:33 AM

## 2018-09-17 ENCOUNTER — APPOINTMENT (OUTPATIENT)
Dept: CT IMAGING | Age: 83
DRG: 454 | End: 2018-09-17
Attending: NEUROLOGICAL SURGERY
Payer: MEDICARE

## 2018-09-17 VITALS
RESPIRATION RATE: 16 BRPM | WEIGHT: 128 LBS | BODY MASS INDEX: 25.13 KG/M2 | SYSTOLIC BLOOD PRESSURE: 160 MMHG | TEMPERATURE: 98.3 F | HEIGHT: 60 IN | OXYGEN SATURATION: 93 % | HEART RATE: 93 BPM | DIASTOLIC BLOOD PRESSURE: 80 MMHG

## 2018-09-17 PROCEDURE — 97530 THERAPEUTIC ACTIVITIES: CPT

## 2018-09-17 PROCEDURE — 6360000002 HC RX W HCPCS: Performed by: NEUROLOGICAL SURGERY

## 2018-09-17 PROCEDURE — 6370000000 HC RX 637 (ALT 250 FOR IP): Performed by: NEUROLOGICAL SURGERY

## 2018-09-17 PROCEDURE — 97110 THERAPEUTIC EXERCISES: CPT

## 2018-09-17 PROCEDURE — 2580000003 HC RX 258: Performed by: PHYSICIAN ASSISTANT

## 2018-09-17 PROCEDURE — 97112 NEUROMUSCULAR REEDUCATION: CPT

## 2018-09-17 PROCEDURE — 6360000002 HC RX W HCPCS: Performed by: CLINICAL NURSE SPECIALIST

## 2018-09-17 PROCEDURE — 97535 SELF CARE MNGMENT TRAINING: CPT

## 2018-09-17 PROCEDURE — 74176 CT ABD & PELVIS W/O CONTRAST: CPT

## 2018-09-17 RX ORDER — HYDROCODONE BITARTRATE AND ACETAMINOPHEN 5; 325 MG/1; MG/1
1 TABLET ORAL EVERY 4 HOURS PRN
Qty: 42 TABLET | Refills: 0 | Status: SHIPPED | OUTPATIENT
Start: 2018-09-17 | End: 2018-09-24

## 2018-09-17 RX ORDER — CYCLOBENZAPRINE HCL 10 MG
10 TABLET ORAL 3 TIMES DAILY PRN
Qty: 30 TABLET | Refills: 0 | Status: SHIPPED | OUTPATIENT
Start: 2018-09-17 | End: 2018-09-27

## 2018-09-17 RX ADMIN — HYDRALAZINE HYDROCHLORIDE 10 MG: 20 INJECTION INTRAMUSCULAR; INTRAVENOUS at 16:33

## 2018-09-17 RX ADMIN — SODIUM CHLORIDE: 9 INJECTION, SOLUTION INTRAVENOUS at 06:45

## 2018-09-17 RX ADMIN — HYDRALAZINE HYDROCHLORIDE 10 MG: 10 TABLET, FILM COATED ORAL at 14:53

## 2018-09-17 RX ADMIN — HYDROCODONE BITARTRATE AND ACETAMINOPHEN 2 TABLET: 5; 325 TABLET ORAL at 18:42

## 2018-09-17 RX ADMIN — HYDRALAZINE HYDROCHLORIDE 10 MG: 10 TABLET, FILM COATED ORAL at 18:04

## 2018-09-17 RX ADMIN — HYDROCODONE BITARTRATE AND ACETAMINOPHEN 2 TABLET: 5; 325 TABLET ORAL at 06:45

## 2018-09-17 RX ADMIN — Medication 10 ML: at 09:35

## 2018-09-17 RX ADMIN — LABETALOL HYDROCHLORIDE 100 MG: 100 TABLET, FILM COATED ORAL at 09:36

## 2018-09-17 RX ADMIN — ESCITALOPRAM OXALATE 5 MG: 10 TABLET, FILM COATED ORAL at 09:36

## 2018-09-17 RX ADMIN — HYDROCHLOROTHIAZIDE 6.25 MG: 12.5 TABLET ORAL at 09:36

## 2018-09-17 RX ADMIN — METOCLOPRAMIDE 10 MG: 5 INJECTION, SOLUTION INTRAMUSCULAR; INTRAVENOUS at 06:45

## 2018-09-17 ASSESSMENT — PAIN DESCRIPTION - ONSET: ONSET: ON-GOING

## 2018-09-17 ASSESSMENT — PAIN DESCRIPTION - LOCATION
LOCATION: BACK
LOCATION: BACK

## 2018-09-17 ASSESSMENT — PAIN DESCRIPTION - DESCRIPTORS: DESCRIPTORS: ACHING;DISCOMFORT;SORE

## 2018-09-17 ASSESSMENT — PAIN DESCRIPTION - FREQUENCY: FREQUENCY: INTERMITTENT

## 2018-09-17 ASSESSMENT — PAIN DESCRIPTION - PAIN TYPE
TYPE: SURGICAL PAIN
TYPE: SURGICAL PAIN

## 2018-09-17 ASSESSMENT — PAIN SCALES - GENERAL
PAINLEVEL_OUTOF10: 0
PAINLEVEL_OUTOF10: 0
PAINLEVEL_OUTOF10: 8
PAINLEVEL_OUTOF10: 7

## 2018-09-17 ASSESSMENT — PAIN DESCRIPTION - PROGRESSION: CLINICAL_PROGRESSION: NOT CHANGED

## 2018-09-17 ASSESSMENT — PAIN DESCRIPTION - ORIENTATION
ORIENTATION: LOWER
ORIENTATION: MID;LOWER

## 2018-09-17 NOTE — CONSULTS
edema  Skin:  Warm and dry. Neuro: Cranial nerves II-XII grossly intact  Rectal: deferred  Genitalia:  Rios catheter draining clear yellow urine    Labs:     Recent Labs      09/16/18   1215   WBC  12.3*   RBC  3.61   HGB  10.7*   HCT  32.2*   MCV  89.2   MCH  29.6   MCHC  33.2   RDW  16.7*   PLT  156   MPV  9.1         Recent Labs      09/16/18   1213   CREATININE  1.1*         Assessment/plan:    Situational urinary retention  OAB, Mixed incontinence  Will get ct abdomen and pelvis as baseline to eval upper tracts/adrenals  Pt will be discharged to [facility] with rios catheter. Catheter can be removed once she her mobility increases, at rehab. Hopefully in the next week or two. I encouraged outpt follow up for her OAB sx.      Yumi Mcdaniel NP-C  [facility]  September 17, 2018            Electronically signed by AXEL Bey CNP on 9/17/2018 at 11:18 AM

## 2018-09-17 NOTE — PROGRESS NOTES
Physical Therapy  Facility/Department: [address] NEURO SPINE  Daily Treatment Note  NAME: Raymon Albarado  : 1935  MRN: [id number]    Date of Service: 2018   Evaluating PT:  Segundo Stack, PT [id number]     Room #:  7228/9978-Q  Diagnosis:  Lumbar disc herniation  Surgery: 18 L3-S1 FUSION, L4-L5, L5-S1  TRANSFORAMINAL LUMBAR INTERBODY FUSION   PMHx:    Past Medical History             Diagnosis Date    Anxiety      Arthritis      Back pain      Cancer (HCC)       skin    Depression      Hyperlipidemia      Hypertension      Osteoporosis           Past Surgical History             Procedure Laterality Date    COLONOSCOPY        EYE SURGERY         carmelo lense implants    HYSTERECTOMY        NERVE BLOCK Left 2017     leftlumbar transforaminal NB #1     NERVE BLOCK Left 2017     Left lumbar transforaminal nerve block #2 L4-5, L5-S1    NERVE BLOCK Left 2017     transforaminal nerve block, lumbar #3    NERVE BLOCK Left 08/15/2017     paravertebral facet left lumbar #1    OTHER SURGICAL HISTORY   02/10/2017     CT guided lumbar spine bone biopsy    PARATHYROID GLAND SURGERY        [place] LUMBAR SPINE FUSN,POST INTRBDY N/A 2018     L3-S1 FUSION, L4-L5, L5-S1  TRANSFORAMINAL LUMBAR INTERBODY FUSION -- NEEDS CAGES, PLATES, SCREWS, O-ARM, AUDIOLOGY, CHIARA TABLE, CELL SAVER, PLATELET GEL - [employer] performed by Christina Feliciano MD at [facility] OR         Precautions:  Spinal precautions, LSO, falls, Bed alarm  Equipment Needs: To be determined     Pt lives with  in a 1 story home with 3 + 1 stairs to enter and 2 rail. Bed is on 1 floor and bath is on 1 floor. Pt ambulated with ind PTA. Equipment owned: cane, w/lakeisha, fww       Initial Evaluation  Date: 18 Treatment  18 Short Term/ Long Term   Goals   AM-PAC 6 Clicks      Was pt agreeable to Eval/treatment? yes  yes     Does pt have pain? yes  c/o back 5/10 groin pain      Bed Mobility  Rolling:  Max A  Supine to sit: Max x 2  Sit to supine: Max x 2  Scooting: Max x 2  Rolling: Max A  Supine to sit: Mod A x 2  Sit to supine: Mod A x 2  Scooting: Max A Rolling: Min a  Supine to sit: Min A  Sit to supine: min A  Scooting: Min A   Transfers Sit to stand: Max x 2  Stand to sit: Max x 2  Stand pivot: NT unsafe Sit to stand; Mod A x 2  Stand to sit: MOd A x 2  Stand pivot: NT  Sit to stand: Min a  Stand to sit: Min A  Stand pivot: Min A   Ambulation    3 feet to HOB with Max x 2 HHA.   5 feet side stepping at EOB Mod a x 2 150 feet with  Min A with fww. Stair negotiation: ascended and descended  NT  NT 3 steps with 2 rail Min A   ROM BUE:  See OT eval  BLE:  WFL       Strength BUE: See OT eval   RLE:  4-/5  LLE:   4-/5   4/5   Balance Sitting EOB:  Min A   Dynamic Standing: Max A x 2  Sitting; Min A   Dynamic standing Mod A x 2 Sitting EOB:  Ind  Dynamic Standing:  Min A      Pt performed therapeutic exercise of the following: Seated EOB B LE Ap's and LAQ 2 x 10 reps    Patient education  Pt was educated on side stepping at EOB,     Patient response to education:   Pt verbalized understanding Pt demonstrated skill Pt requires further education in this area   x x x     Additional Comments: Pt supine on arrival and agreeable to PT and OT treatment. Pt was assisted to log roll to daisy LSO at this time. Pt was able to follow commands with 75% accuracy throughout treatment. Pt oriented to year and place, however reported month to be October. Once pt seated EOB she demonstrated retro lean with difficulty correcting. Sat EOB 15 minutes and performed therex. Pt stood with posterior lean and narrow BENJAMIN. Pt given cues to correct with minimal carryover. Pt limited by fatigue and weakness at this time. Pt was left in bed in chair position LSO still donned with call light left by patient. Time in: 0745  Time out: 0812    Pt is making good progress toward established Physical Therapy goals.   Continue with physical therapy current

## 2018-09-17 NOTE — PROGRESS NOTES
Occupational Therapy  OT BEDSIDE TREATMENT NOTE      Date:2018  Patient Name: Ashtyn Zaldivar  MRN: [id number]  : 1935  Room: [address] [place], [place] [address]     Evaluating OT:  PIOTR Brady, OTR/L  # [id number]     AM-PAC Raw Score:  10/24  G Code CL     Recommended Adaptive Equipment: TBD as pt progresses       Reason for Admission:  Pt presents with a Hx of back pain radiating down Left LE.  (+)  L4-L5 spondylolisthesis with severe right foraminal stenosis and severe bilateral L5-S1 foraminal stenosis. Underwent L4-L5 and L5-S1 TLIF w/ Dr. Kenji Fritz 18     Diagnosis: No diagnosis found.     Past Medical History:   Past Medical History        Past Medical History:   Diagnosis Date    Anxiety      Arthritis      Back pain      Cancer ([facility] [address]       skin    Depression      Hyperlipidemia      Hypertension      Osteoporosis           Past Surgical History:    Past Surgical History         Past Surgical History:   Procedure Laterality Date    COLONOSCOPY        EYE SURGERY         carmelo lense implants    HYSTERECTOMY        NERVE BLOCK Left 2017     leftlumbar transforaminal NB #1     NERVE BLOCK Left 2017     Left lumbar transforaminal nerve block #2 L4-5, L5-S1    NERVE BLOCK Left 2017     transforaminal nerve block, lumbar #3    NERVE BLOCK Left 08/15/2017     paravertebral facet left lumbar #1    OTHER SURGICAL HISTORY   02/10/2017     CT guided lumbar spine bone biopsy    PARATHYROID GLAND SURGERY        [place] LUMBAR SPINE FUSN,POST INTRBDY N/A 2018     L3-S1 FUSION, L4-L5, L5-S1  TRANSFORAMINAL LUMBAR INTERBODY FUSION -- NEEDS CAGES, PLATES, SCREWS, O-ARM, AUDIOLOGY, CHIARA TABLE, CELL SAVER, PLATELET GEL - [employer] performed by Mony Badillo MD at [facility] [place]         Precautions:  Fall Risk  Spinal Precautions  LSO  Bed Alarm       Home Living: Pt lives with her  in a 1-story house with 3 ALEX and Carmelo HR/s.   Bed/bath on the main floor  Bathroom setup: [employer], standard

## 2018-09-17 NOTE — PROGRESS NOTES
Hospitalist Progress Note      PCP: Jerad Brock MD    Date of Admission: 9/13/2018    Chief Complaint: medical management    Hospital Course:   81 y.o. female who we are asked to see/evaluate by Nathan Culp MD for medical management of * of hypertension, had a  L4-L5 and L5-S1 TLIF today . Her blood pressure is elevated after surgery   9/15/18: no acute events overnight. BP stable. Labs stable  9/16/18: patient hypertensive today. Daily medications given and BP responded initially; upon recheck her BP was elevated again. Hydralazine PRN one time administered. She is complaining of low abdominal pain that is worse with palpation. BMP ordered stat, last creatinine 1.3 on 9/14. Bladder scan ordered despite patient stating she is voiding frequently. CT abdomen/pelvis ordered, will add with contrast if Cr is WNL. No pulsatile masses identified, low abdomen is firm and bowel sounds are hypoactive but present. Subjective:   Patient lying in bed complaining of low abdominal pain that is worse with palpation. She denies nausea or vomiting.  Describes pain as sharp ache      Medications:  Reviewed    Infusion Medications    sodium chloride 100 mL/hr at 09/17/18 0645     Scheduled Medications    sodium chloride  250 mL Intravenous Once    sodium chloride flush  10 mL Intravenous Q12H    escitalopram  5 mg Oral Daily    hydrALAZINE  10 mg Oral 4x Daily    hydrochlorothiazide  6.25 mg Oral Daily    labetalol  100 mg Oral BID    LORazepam  1 mg Oral Nightly    pravastatin  20 mg Oral Daily    rOPINIRole  0.5 mg Oral Nightly    sodium chloride flush  10 mL Intravenous 2 times per day    metoclopramide  10 mg Intravenous Q6H    lactulose  20 g Oral TID     PRN Meds: hydrALAZINE, sodium chloride flush, acetaminophen, ondansetron, HYDROcodone 5 mg - acetaminophen **OR** HYDROcodone 5 mg - acetaminophen, morphine **OR** morphine, cyclobenzaprine, sennosides-docusate sodium, bisacodyl      Intake/Output BABS (acute kidney injury) ([facility] [address] [N17.9] 09/15/2018    Lumbar disc herniation [M51.26] 09/13/2018     BABS  - monitor labs  - stable    Abdominal pain  - resolved after rios placed     Urinary retention   - rios in place     HTN  - stable     Acute blood loss anemia  - stable    HLD  - resume home medications    Lumbar disk herniation  - management per primary team       DVT Prophylaxis: SCDs  Diet: DIET GENERAL; No Added Salt (3-4 GM)  Code Status: Full Code    PT/OT Eval Status: active and ongoing    Dispo - per primary    LishaAXEL Boggs - CNP

## 2018-09-21 NOTE — DISCHARGE SUMMARY
[address]                   [address] [facility],  [facility]                                 DISCHARGE SUMMARY    PATIENT NAME: Amy Correia                      :        1935  MED REC NO:   [id number]                            ROOM:       5206  ACCOUNT NO:   [de-identified]                           ADMIT DATE: 2018  PROVIDER:     Otho Boeck, PA                  DISCHARGE DATE:  2018      DATE OF SURGERY:  2018    PRINCIPAL DIAGNOSES:  L3 through S1 lumbar stenosis and L4-L5 and L5-S1  degenerative spondylolisthesis. PROCEDURE:  L3-L4, L4-L5, and L5-S1 posterior lumbar interbody fusion, and  L3, L4, L5, and S1 laminectomies. HISTORY OF PRESENT ILLNESS:  A pleasant 62-year-old female who presented to  Dr. Loli Mendez with significant back and leg pain. Imaging showed severe  degenerative changes and stenosis from L3 through S1. After failing  conservative treatment options, the patient opted for surgical  intervention. HOSPITAL COURSE:  The patient tolerated the procedure well without any  complications. Postoperatively, she was eating, voiding, and ambulating  fairly well. She was discharged in stable condition, and told to return to  the office in four weeks for followup. She was told to call early with any  other questions or concerns. She was discharged with analgesics and muscle  relaxers. She was told no lifting greater than 10 pounds. No strenuous  activity. No repetitive bending, twisting, pushing or pulling.         Emil Alston    D: 2018 10:10:28       T: 2018 10:42:04     [email or web address]  Job#: [id number]     Doc#: [id number]    CC:

## 2018-09-27 DIAGNOSIS — M54.5 ACUTE LOW BACK PAIN, UNSPECIFIED BACK PAIN LATERALITY, WITH SCIATICA PRESENCE UNSPECIFIED: Primary | ICD-10-CM

## 2018-10-12 ENCOUNTER — HOSPITAL ENCOUNTER (OUTPATIENT)
Age: 83
Discharge: HOME | End: 2018-10-14
Payer: MEDICARE

## 2018-10-12 ENCOUNTER — HOSPITAL ENCOUNTER (OUTPATIENT)
Dept: GENERAL RADIOLOGY | Age: 83
Discharge: HOME | End: 2018-10-14
Payer: MEDICARE

## 2018-10-12 ENCOUNTER — OFFICE VISIT (OUTPATIENT)
Dept: NEUROSURGERY | Age: 83
End: 2018-10-12

## 2018-10-12 VITALS
WEIGHT: 125 LBS | HEART RATE: 67 BPM | SYSTOLIC BLOOD PRESSURE: 162 MMHG | DIASTOLIC BLOOD PRESSURE: 75 MMHG | HEIGHT: 60 IN | BODY MASS INDEX: 24.54 KG/M2

## 2018-10-12 DIAGNOSIS — M54.5 ACUTE LOW BACK PAIN, UNSPECIFIED BACK PAIN LATERALITY, WITH SCIATICA PRESENCE UNSPECIFIED: ICD-10-CM

## 2018-10-12 DIAGNOSIS — M54.50 CHRONIC MIDLINE LOW BACK PAIN WITHOUT SCIATICA: Primary | ICD-10-CM

## 2018-10-12 DIAGNOSIS — G89.29 CHRONIC MIDLINE LOW BACK PAIN WITHOUT SCIATICA: Primary | ICD-10-CM

## 2018-10-12 PROCEDURE — 72100 X-RAY EXAM L-S SPINE 2/3 VWS: CPT

## 2018-10-12 PROCEDURE — 99024 POSTOP FOLLOW-UP VISIT: CPT | Performed by: NEUROLOGICAL SURGERY

## 2018-10-12 RX ORDER — HYDROCHLOROTHIAZIDE 12.5 MG/1
TABLET ORAL
COMMUNITY
Start: 2018-10-11 | End: 2018-10-12 | Stop reason: SDUPTHER

## 2018-10-12 RX ORDER — LISINOPRIL 5 MG/1
5 TABLET ORAL DAILY
Status: ON HOLD | COMMUNITY
Start: 2018-10-11 | End: 2019-01-01 | Stop reason: HOSPADM

## 2018-11-12 DIAGNOSIS — M54.5 ACUTE LOW BACK PAIN, UNSPECIFIED BACK PAIN LATERALITY, WITH SCIATICA PRESENCE UNSPECIFIED: Primary | ICD-10-CM

## 2018-11-29 ENCOUNTER — OFFICE VISIT (OUTPATIENT)
Dept: NEUROSURGERY | Age: 83
End: 2018-11-29

## 2018-11-29 ENCOUNTER — HOSPITAL ENCOUNTER (OUTPATIENT)
Age: 83
Discharge: HOME | DRG: 093 | End: 2018-12-01
Payer: MEDICARE

## 2018-11-29 ENCOUNTER — HOSPITAL ENCOUNTER (OUTPATIENT)
Dept: GENERAL RADIOLOGY | Age: 83
Discharge: HOME | DRG: 093 | End: 2018-12-01
Payer: MEDICARE

## 2018-11-29 VITALS
WEIGHT: 135 LBS | DIASTOLIC BLOOD PRESSURE: 88 MMHG | BODY MASS INDEX: 26.5 KG/M2 | HEIGHT: 60 IN | SYSTOLIC BLOOD PRESSURE: 181 MMHG | HEART RATE: 62 BPM

## 2018-11-29 DIAGNOSIS — M54.5 ACUTE LOW BACK PAIN, UNSPECIFIED BACK PAIN LATERALITY, WITH SCIATICA PRESENCE UNSPECIFIED: ICD-10-CM

## 2018-11-29 DIAGNOSIS — M51.26 LUMBAR DISC HERNIATION: Primary | ICD-10-CM

## 2018-11-29 PROCEDURE — 72100 X-RAY EXAM L-S SPINE 2/3 VWS: CPT

## 2018-11-29 PROCEDURE — 99024 POSTOP FOLLOW-UP VISIT: CPT | Performed by: PHYSICIAN ASSISTANT

## 2018-12-02 ENCOUNTER — HOSPITAL ENCOUNTER (INPATIENT)
Age: 83
LOS: 2 days | Discharge: HOME | DRG: 093 | End: 2018-12-04
Attending: EMERGENCY MEDICINE | Admitting: HOSPITALIST
Payer: MEDICARE

## 2018-12-02 ENCOUNTER — APPOINTMENT (OUTPATIENT)
Dept: CT IMAGING | Age: 83
DRG: 093 | End: 2018-12-02
Payer: MEDICARE

## 2018-12-02 DIAGNOSIS — R29.6 FALLS FREQUENTLY: Primary | ICD-10-CM
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
## 2018-12-02 LAB
ANION GAP SERPL CALCULATED.3IONS-SCNC: 11 MMOL/L (ref 7–16)
BACTERIA: NORMAL /HPF
BASOPHILS ABSOLUTE: 0.03 E9/L (ref 0–0.2)
BASOPHILS RELATIVE PERCENT: 0.4 % (ref 0–2)
BILIRUBIN URINE: NEGATIVE
BLOOD, URINE: NORMAL
BUN BLDV-MCNC: 27 MG/DL (ref 8–23)
CALCIUM SERPL-MCNC: 9.7 MG/DL (ref 8.6–10.2)
CHLORIDE BLD-SCNC: 105 MMOL/L (ref 98–107)
CLARITY: CLEAR
CO2: 25 MMOL/L (ref 22–29)
COLOR: YELLOW
CREAT SERPL-MCNC: 1.3 MG/DL (ref 0.5–1)
EOSINOPHILS ABSOLUTE: 0.1 E9/L (ref 0.05–0.5)
EOSINOPHILS RELATIVE PERCENT: 1.3 % (ref 0–6)
GFR AFRICAN AMERICAN: 47
GFR NON-AFRICAN AMERICAN: 39 ML/MIN/1.73
GLUCOSE BLD-MCNC: 87 MG/DL (ref 74–99)
GLUCOSE URINE: NEGATIVE MG/DL
HCT VFR BLD CALC: 35.2 % (ref 34–48)
HEMOGLOBIN: 11.4 G/DL (ref 11.5–15.5)
IMMATURE GRANULOCYTES #: 0.09 E9/L
IMMATURE GRANULOCYTES %: 1.1 % (ref 0–5)
KETONES, URINE: NEGATIVE MG/DL
LEUKOCYTE ESTERASE, URINE: NEGATIVE
LYMPHOCYTES ABSOLUTE: 1.04 E9/L (ref 1.5–4)
LYMPHOCYTES RELATIVE PERCENT: 13 % (ref 20–42)
MCH RBC QN AUTO: 29.6 PG (ref 26–35)
MCHC RBC AUTO-ENTMCNC: 32.4 % (ref 32–34.5)
MCV RBC AUTO: 91.4 FL (ref 80–99.9)
MONOCYTES ABSOLUTE: 0.57 E9/L (ref 0.1–0.95)
MONOCYTES RELATIVE PERCENT: 7.1 % (ref 2–12)
NEUTROPHILS ABSOLUTE: 6.15 E9/L (ref 1.8–7.3)
NEUTROPHILS RELATIVE PERCENT: 77.1 % (ref 43–80)
NITRITE, URINE: NEGATIVE
PDW BLD-RTO: 15.1 FL (ref 11.5–15)
PH UA: 6.5 (ref 5–9)
PLATELET # BLD: 267 E9/L (ref 130–450)
PMV BLD AUTO: 9 FL (ref 7–12)
POTASSIUM REFLEX MAGNESIUM: 4.2 MMOL/L (ref 3.5–5)
PROTEIN UA: NEGATIVE MG/DL
RBC # BLD: 3.85 E12/L (ref 3.5–5.5)
RBC UA: NORMAL /HPF (ref 0–2)
SODIUM BLD-SCNC: 141 MMOL/L (ref 132–146)
SPECIFIC GRAVITY UA: 1.02 (ref 1–1.03)
UROBILINOGEN, URINE: 0.2 E.U./DL
WBC # BLD: 8 E9/L (ref 4.5–11.5)
WBC UA: NORMAL /HPF (ref 0–5)

## 2018-12-02 PROCEDURE — 6360000002 HC RX W HCPCS: Performed by: HOSPITALIST

## 2018-12-02 PROCEDURE — 72131 CT LUMBAR SPINE W/O DYE: CPT

## 2018-12-02 PROCEDURE — 72192 CT PELVIS W/O DYE: CPT

## 2018-12-02 PROCEDURE — 87088 URINE BACTERIA CULTURE: CPT

## 2018-12-02 PROCEDURE — 81001 URINALYSIS AUTO W/SCOPE: CPT

## 2018-12-02 PROCEDURE — 96374 THER/PROPH/DIAG INJ IV PUSH: CPT

## 2018-12-02 PROCEDURE — 96375 TX/PRO/DX INJ NEW DRUG ADDON: CPT

## 2018-12-02 PROCEDURE — 36415 COLL VENOUS BLD VENIPUNCTURE: CPT

## 2018-12-02 PROCEDURE — 2580000003 HC RX 258: Performed by: HOSPITALIST

## 2018-12-02 PROCEDURE — 1200000000 HC SEMI PRIVATE

## 2018-12-02 PROCEDURE — 6360000002 HC RX W HCPCS: Performed by: EMERGENCY MEDICINE

## 2018-12-02 PROCEDURE — 6370000000 HC RX 637 (ALT 250 FOR IP): Performed by: HOSPITALIST

## 2018-12-02 PROCEDURE — 72128 CT CHEST SPINE W/O DYE: CPT

## 2018-12-02 PROCEDURE — 99285 EMERGENCY DEPT VISIT HI MDM: CPT

## 2018-12-02 PROCEDURE — 85025 COMPLETE CBC W/AUTO DIFF WBC: CPT

## 2018-12-02 PROCEDURE — 80048 BASIC METABOLIC PNL TOTAL CA: CPT

## 2018-12-02 PROCEDURE — 2700000000 HC OXYGEN THERAPY PER DAY

## 2018-12-02 RX ORDER — PRAVASTATIN SODIUM 20 MG
20 TABLET ORAL DAILY
Status: DISCONTINUED | OUTPATIENT
Start: 2018-12-02 | End: 2018-12-04 | Stop reason: HOSPADM

## 2018-12-02 RX ORDER — ONDANSETRON 2 MG/ML
4 INJECTION INTRAMUSCULAR; INTRAVENOUS EVERY 6 HOURS PRN
Status: DISCONTINUED | OUTPATIENT
Start: 2018-12-02 | End: 2018-12-02

## 2018-12-02 RX ORDER — LISINOPRIL 10 MG/1
5 TABLET ORAL DAILY
Status: DISCONTINUED | OUTPATIENT
Start: 2018-12-03 | End: 2018-12-03

## 2018-12-02 RX ORDER — HYDROCODONE BITARTRATE AND ACETAMINOPHEN 5; 325 MG/1; MG/1
1 TABLET ORAL ONCE
Status: COMPLETED | OUTPATIENT
Start: 2018-12-02 | End: 2018-12-02

## 2018-12-02 RX ORDER — SODIUM CHLORIDE 0.9 % (FLUSH) 0.9 %
10 SYRINGE (ML) INJECTION EVERY 12 HOURS SCHEDULED
Status: DISCONTINUED | OUTPATIENT
Start: 2018-12-02 | End: 2018-12-04 | Stop reason: HOSPADM

## 2018-12-02 RX ORDER — HYDROCHLOROTHIAZIDE 12.5 MG/1
6.25 TABLET ORAL
Status: DISCONTINUED | OUTPATIENT
Start: 2018-12-06 | End: 2018-12-03

## 2018-12-02 RX ORDER — SODIUM CHLORIDE 0.9 % (FLUSH) 0.9 %
10 SYRINGE (ML) INJECTION PRN
Status: DISCONTINUED | OUTPATIENT
Start: 2018-12-02 | End: 2018-12-04 | Stop reason: HOSPADM

## 2018-12-02 RX ORDER — ONDANSETRON 2 MG/ML
4 INJECTION INTRAMUSCULAR; INTRAVENOUS EVERY 6 HOURS PRN
Status: DISCONTINUED | OUTPATIENT
Start: 2018-12-02 | End: 2018-12-04 | Stop reason: HOSPADM

## 2018-12-02 RX ORDER — ESCITALOPRAM OXALATE 10 MG/1
5 TABLET ORAL DAILY
Status: DISCONTINUED | OUTPATIENT
Start: 2018-12-02 | End: 2018-12-04 | Stop reason: HOSPADM

## 2018-12-02 RX ORDER — FUROSEMIDE 10 MG/ML
20 INJECTION INTRAMUSCULAR; INTRAVENOUS ONCE
Status: COMPLETED | OUTPATIENT
Start: 2018-12-02 | End: 2018-12-02

## 2018-12-02 RX ORDER — HYDRALAZINE HYDROCHLORIDE 10 MG/1
10 TABLET, FILM COATED ORAL 4 TIMES DAILY
Status: DISCONTINUED | OUTPATIENT
Start: 2018-12-02 | End: 2018-12-04 | Stop reason: HOSPADM

## 2018-12-02 RX ORDER — ROPINIROLE 0.5 MG/1
0.5 TABLET, FILM COATED ORAL NIGHTLY
Status: DISCONTINUED | OUTPATIENT
Start: 2018-12-02 | End: 2018-12-04 | Stop reason: HOSPADM

## 2018-12-02 RX ORDER — HYDRALAZINE HYDROCHLORIDE 20 MG/ML
5 INJECTION INTRAMUSCULAR; INTRAVENOUS ONCE
Status: COMPLETED | OUTPATIENT
Start: 2018-12-02 | End: 2018-12-02

## 2018-12-02 RX ORDER — MORPHINE SULFATE 4 MG/ML
4 INJECTION, SOLUTION INTRAMUSCULAR; INTRAVENOUS ONCE
Status: COMPLETED | OUTPATIENT
Start: 2018-12-02 | End: 2018-12-02

## 2018-12-02 RX ORDER — LABETALOL 100 MG/1
100 TABLET, FILM COATED ORAL 2 TIMES DAILY
Status: DISCONTINUED | OUTPATIENT
Start: 2018-12-02 | End: 2018-12-04 | Stop reason: HOSPADM

## 2018-12-02 RX ORDER — MORPHINE SULFATE 2 MG/ML
2 INJECTION, SOLUTION INTRAMUSCULAR; INTRAVENOUS ONCE
Status: COMPLETED | OUTPATIENT
Start: 2018-12-02 | End: 2018-12-02

## 2018-12-02 RX ADMIN — MORPHINE SULFATE 4 MG: 4 INJECTION, SOLUTION INTRAMUSCULAR; INTRAVENOUS at 16:55

## 2018-12-02 RX ADMIN — HYDROCODONE BITARTRATE AND ACETAMINOPHEN 1 TABLET: 5; 325 TABLET ORAL at 21:01

## 2018-12-02 RX ADMIN — PRAVASTATIN SODIUM 20 MG: 20 TABLET ORAL at 21:01

## 2018-12-02 RX ADMIN — HYDRALAZINE HYDROCHLORIDE 5 MG: 20 INJECTION INTRAMUSCULAR; INTRAVENOUS at 15:11

## 2018-12-02 RX ADMIN — ENOXAPARIN SODIUM 40 MG: 40 INJECTION SUBCUTANEOUS at 21:00

## 2018-12-02 RX ADMIN — ONDANSETRON 4 MG: 2 INJECTION INTRAMUSCULAR; INTRAVENOUS at 12:18

## 2018-12-02 RX ADMIN — LABETALOL HYDROCHLORIDE 100 MG: 100 TABLET, FILM COATED ORAL at 21:00

## 2018-12-02 RX ADMIN — FUROSEMIDE 20 MG: 10 INJECTION, SOLUTION INTRAMUSCULAR; INTRAVENOUS at 15:11

## 2018-12-02 RX ADMIN — MORPHINE SULFATE 2 MG: 2 INJECTION, SOLUTION INTRAMUSCULAR; INTRAVENOUS at 12:18

## 2018-12-02 RX ADMIN — ESCITALOPRAM OXALATE 5 MG: 10 TABLET, FILM COATED ORAL at 21:00

## 2018-12-02 RX ADMIN — ROPINIROLE HYDROCHLORIDE 0.5 MG: 0.5 TABLET, FILM COATED ORAL at 21:01

## 2018-12-02 RX ADMIN — Medication 10 ML: at 21:01

## 2018-12-02 RX ADMIN — HYDRALAZINE HYDROCHLORIDE 10 MG: 10 TABLET, FILM COATED ORAL at 22:06

## 2018-12-02 ASSESSMENT — PAIN DESCRIPTION - PROGRESSION: CLINICAL_PROGRESSION: NOT CHANGED

## 2018-12-02 ASSESSMENT — PAIN DESCRIPTION - PAIN TYPE
TYPE: CHRONIC PAIN
TYPE: ACUTE PAIN
TYPE: ACUTE PAIN;CHRONIC PAIN
TYPE: CHRONIC PAIN

## 2018-12-02 ASSESSMENT — PAIN SCALES - GENERAL
PAINLEVEL_OUTOF10: 6
PAINLEVEL_OUTOF10: 10
PAINLEVEL_OUTOF10: 7
PAINLEVEL_OUTOF10: 6
PAINLEVEL_OUTOF10: 0

## 2018-12-02 ASSESSMENT — PAIN DESCRIPTION - LOCATION
LOCATION: BACK

## 2018-12-02 ASSESSMENT — PAIN DESCRIPTION - FREQUENCY
FREQUENCY: CONTINUOUS
FREQUENCY: CONTINUOUS

## 2018-12-02 ASSESSMENT — PAIN DESCRIPTION - DESCRIPTORS
DESCRIPTORS: ACHING;DISCOMFORT;DULL
DESCRIPTORS: SORE

## 2018-12-02 ASSESSMENT — PAIN DESCRIPTION - ORIENTATION: ORIENTATION: RIGHT;LEFT;MID;LOWER

## 2018-12-02 ASSESSMENT — PAIN DESCRIPTION - ONSET
ONSET: ON-GOING
ONSET: ON-GOING

## 2018-12-03 ENCOUNTER — APPOINTMENT (OUTPATIENT)
Dept: GENERAL RADIOLOGY | Age: 83
DRG: 093 | End: 2018-12-03
Payer: MEDICARE

## 2018-12-03 LAB
ALBUMIN SERPL-MCNC: 3.6 G/DL (ref 3.5–5.2)
ALP BLD-CCNC: 122 U/L (ref 35–104)
ALT SERPL-CCNC: 22 U/L (ref 0–32)
ANION GAP SERPL CALCULATED.3IONS-SCNC: 12 MMOL/L (ref 7–16)
AST SERPL-CCNC: 28 U/L (ref 0–31)
BILIRUB SERPL-MCNC: 0.5 MG/DL (ref 0–1.2)
BILIRUBIN DIRECT: <0.2 MG/DL (ref 0–0.3)
BILIRUBIN, INDIRECT: ABNORMAL MG/DL (ref 0–1)
BUN BLDV-MCNC: 27 MG/DL (ref 8–23)
CALCIUM SERPL-MCNC: 9.1 MG/DL (ref 8.6–10.2)
CHLORIDE BLD-SCNC: 105 MMOL/L (ref 98–107)
CO2: 26 MMOL/L (ref 22–29)
CREAT SERPL-MCNC: 1.5 MG/DL (ref 0.5–1)
GFR AFRICAN AMERICAN: 40
GFR NON-AFRICAN AMERICAN: 33 ML/MIN/1.73
GLUCOSE BLD-MCNC: 79 MG/DL (ref 74–99)
HCT VFR BLD CALC: 32.2 % (ref 34–48)
HEMOGLOBIN: 10.1 G/DL (ref 11.5–15.5)
MAGNESIUM: 1.9 MG/DL (ref 1.6–2.6)
MCH RBC QN AUTO: 29.1 PG (ref 26–35)
MCHC RBC AUTO-ENTMCNC: 31.4 % (ref 32–34.5)
MCV RBC AUTO: 92.8 FL (ref 80–99.9)
PDW BLD-RTO: 15.2 FL (ref 11.5–15)
PLATELET # BLD: 219 E9/L (ref 130–450)
PMV BLD AUTO: 9 FL (ref 7–12)
POTASSIUM REFLEX MAGNESIUM: 4.5 MMOL/L (ref 3.5–5)
PREALBUMIN: 18 MG/DL (ref 20–40)
RBC # BLD: 3.47 E12/L (ref 3.5–5.5)
SODIUM BLD-SCNC: 143 MMOL/L (ref 132–146)
TOTAL PROTEIN: 6.5 G/DL (ref 6.4–8.3)
WBC # BLD: 5 E9/L (ref 4.5–11.5)

## 2018-12-03 PROCEDURE — 85027 COMPLETE CBC AUTOMATED: CPT

## 2018-12-03 PROCEDURE — 6360000002 HC RX W HCPCS: Performed by: INTERNAL MEDICINE

## 2018-12-03 PROCEDURE — 2700000000 HC OXYGEN THERAPY PER DAY

## 2018-12-03 PROCEDURE — 97162 PT EVAL MOD COMPLEX 30 MIN: CPT

## 2018-12-03 PROCEDURE — 73120 X-RAY EXAM OF HAND: CPT

## 2018-12-03 PROCEDURE — 84134 ASSAY OF PREALBUMIN: CPT

## 2018-12-03 PROCEDURE — G8978 MOBILITY CURRENT STATUS: HCPCS

## 2018-12-03 PROCEDURE — 97530 THERAPEUTIC ACTIVITIES: CPT

## 2018-12-03 PROCEDURE — 72040 X-RAY EXAM NECK SPINE 2-3 VW: CPT

## 2018-12-03 PROCEDURE — 36415 COLL VENOUS BLD VENIPUNCTURE: CPT

## 2018-12-03 PROCEDURE — G8988 SELF CARE GOAL STATUS: HCPCS

## 2018-12-03 PROCEDURE — 83735 ASSAY OF MAGNESIUM: CPT

## 2018-12-03 PROCEDURE — 6370000000 HC RX 637 (ALT 250 FOR IP): Performed by: NURSE PRACTITIONER

## 2018-12-03 PROCEDURE — G8987 SELF CARE CURRENT STATUS: HCPCS

## 2018-12-03 PROCEDURE — 1200000000 HC SEMI PRIVATE

## 2018-12-03 PROCEDURE — G8979 MOBILITY GOAL STATUS: HCPCS

## 2018-12-03 PROCEDURE — 97165 OT EVAL LOW COMPLEX 30 MIN: CPT

## 2018-12-03 PROCEDURE — 6360000002 HC RX W HCPCS: Performed by: HOSPITALIST

## 2018-12-03 PROCEDURE — 80076 HEPATIC FUNCTION PANEL: CPT

## 2018-12-03 PROCEDURE — 2580000003 HC RX 258: Performed by: HOSPITALIST

## 2018-12-03 PROCEDURE — 73620 X-RAY EXAM OF FOOT: CPT

## 2018-12-03 PROCEDURE — 80048 BASIC METABOLIC PNL TOTAL CA: CPT

## 2018-12-03 PROCEDURE — 73562 X-RAY EXAM OF KNEE 3: CPT

## 2018-12-03 PROCEDURE — 6370000000 HC RX 637 (ALT 250 FOR IP): Performed by: HOSPITALIST

## 2018-12-03 RX ORDER — HYDROCODONE BITARTRATE AND ACETAMINOPHEN 5; 325 MG/1; MG/1
2 TABLET ORAL EVERY 4 HOURS PRN
Status: DISCONTINUED | OUTPATIENT
Start: 2018-12-03 | End: 2018-12-04 | Stop reason: HOSPADM

## 2018-12-03 RX ORDER — HYDROCHLOROTHIAZIDE 12.5 MG/1
6.25 TABLET ORAL DAILY
Status: DISCONTINUED | OUTPATIENT
Start: 2018-12-03 | End: 2018-12-04 | Stop reason: HOSPADM

## 2018-12-03 RX ORDER — HYDROCODONE BITARTRATE AND ACETAMINOPHEN 5; 325 MG/1; MG/1
1 TABLET ORAL EVERY 4 HOURS PRN
Status: DISCONTINUED | OUTPATIENT
Start: 2018-12-03 | End: 2018-12-04 | Stop reason: HOSPADM

## 2018-12-03 RX ORDER — MORPHINE SULFATE 4 MG/ML
4 INJECTION, SOLUTION INTRAMUSCULAR; INTRAVENOUS
Status: DISCONTINUED | OUTPATIENT
Start: 2018-12-03 | End: 2018-12-04 | Stop reason: SDUPTHER

## 2018-12-03 RX ORDER — HEPARIN SODIUM 10000 [USP'U]/ML
5000 INJECTION, SOLUTION INTRAVENOUS; SUBCUTANEOUS EVERY 8 HOURS
Status: DISCONTINUED | OUTPATIENT
Start: 2018-12-04 | End: 2018-12-04 | Stop reason: HOSPADM

## 2018-12-03 RX ORDER — MORPHINE SULFATE 2 MG/ML
2 INJECTION, SOLUTION INTRAMUSCULAR; INTRAVENOUS
Status: DISCONTINUED | OUTPATIENT
Start: 2018-12-03 | End: 2018-12-04 | Stop reason: SDUPTHER

## 2018-12-03 RX ADMIN — HYDRALAZINE HYDROCHLORIDE 10 MG: 10 TABLET, FILM COATED ORAL at 09:53

## 2018-12-03 RX ADMIN — MORPHINE SULFATE 2 MG: 2 INJECTION, SOLUTION INTRAMUSCULAR; INTRAVENOUS at 15:11

## 2018-12-03 RX ADMIN — LABETALOL HYDROCHLORIDE 100 MG: 100 TABLET, FILM COATED ORAL at 21:36

## 2018-12-03 RX ADMIN — LABETALOL HYDROCHLORIDE 100 MG: 100 TABLET, FILM COATED ORAL at 09:52

## 2018-12-03 RX ADMIN — Medication 10 ML: at 09:52

## 2018-12-03 RX ADMIN — MORPHINE SULFATE 2 MG: 2 INJECTION, SOLUTION INTRAMUSCULAR; INTRAVENOUS at 17:55

## 2018-12-03 RX ADMIN — ESCITALOPRAM OXALATE 5 MG: 10 TABLET, FILM COATED ORAL at 09:53

## 2018-12-03 RX ADMIN — PRAVASTATIN SODIUM 20 MG: 20 TABLET ORAL at 09:52

## 2018-12-03 RX ADMIN — ROPINIROLE HYDROCHLORIDE 0.5 MG: 0.5 TABLET, FILM COATED ORAL at 21:36

## 2018-12-03 RX ADMIN — LISINOPRIL 5 MG: 10 TABLET ORAL at 09:52

## 2018-12-03 RX ADMIN — HYDROCHLOROTHIAZIDE 6.25 MG: 12.5 TABLET ORAL at 06:25

## 2018-12-03 RX ADMIN — ENOXAPARIN SODIUM 40 MG: 40 INJECTION SUBCUTANEOUS at 09:52

## 2018-12-03 RX ADMIN — HYDRALAZINE HYDROCHLORIDE 10 MG: 10 TABLET, FILM COATED ORAL at 15:01

## 2018-12-03 RX ADMIN — Medication 10 ML: at 21:36

## 2018-12-03 ASSESSMENT — PAIN DESCRIPTION - PAIN TYPE
TYPE: ACUTE PAIN;CHRONIC PAIN
TYPE: CHRONIC PAIN;ACUTE PAIN

## 2018-12-03 ASSESSMENT — PAIN DESCRIPTION - ONSET: ONSET: ON-GOING

## 2018-12-03 ASSESSMENT — PAIN SCALES - GENERAL
PAINLEVEL_OUTOF10: 10
PAINLEVEL_OUTOF10: 0
PAINLEVEL_OUTOF10: 0
PAINLEVEL_OUTOF10: 4
PAINLEVEL_OUTOF10: 7
PAINLEVEL_OUTOF10: 4

## 2018-12-03 ASSESSMENT — PAIN DESCRIPTION - FREQUENCY
FREQUENCY: CONTINUOUS
FREQUENCY: CONTINUOUS

## 2018-12-03 ASSESSMENT — PAIN DESCRIPTION - ORIENTATION: ORIENTATION: RIGHT;LEFT;MID;LOWER

## 2018-12-03 ASSESSMENT — PAIN DESCRIPTION - DESCRIPTORS
DESCRIPTORS: ACHING;DISCOMFORT
DESCRIPTORS: ACHING;DISCOMFORT

## 2018-12-03 ASSESSMENT — PAIN DESCRIPTION - LOCATION
LOCATION: BACK
LOCATION: BACK

## 2018-12-04 VITALS
TEMPERATURE: 97.4 F | HEART RATE: 76 BPM | OXYGEN SATURATION: 98 % | RESPIRATION RATE: 16 BRPM | DIASTOLIC BLOOD PRESSURE: 63 MMHG | SYSTOLIC BLOOD PRESSURE: 141 MMHG

## 2018-12-04 PROBLEM — R29.6 FALLS FREQUENTLY: Status: RESOLVED | Noted: 2018-12-02 | Resolved: 2018-12-04
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
## 2018-12-04 LAB
ANION GAP SERPL CALCULATED.3IONS-SCNC: 12 MMOL/L (ref 7–16)
BASOPHILS ABSOLUTE: 0.02 E9/L (ref 0–0.2)
BASOPHILS RELATIVE PERCENT: 0.3 % (ref 0–2)
BUN BLDV-MCNC: 31 MG/DL (ref 8–23)
CALCIUM SERPL-MCNC: 8.5 MG/DL (ref 8.6–10.2)
CHLORIDE BLD-SCNC: 100 MMOL/L (ref 98–107)
CO2: 27 MMOL/L (ref 22–29)
CREAT SERPL-MCNC: 1.7 MG/DL (ref 0.5–1)
EOSINOPHILS ABSOLUTE: 0.38 E9/L (ref 0.05–0.5)
EOSINOPHILS RELATIVE PERCENT: 6.1 % (ref 0–6)
GFR AFRICAN AMERICAN: 35
GFR NON-AFRICAN AMERICAN: 29 ML/MIN/1.73
GLUCOSE BLD-MCNC: 80 MG/DL (ref 74–99)
HCT VFR BLD CALC: 29.2 % (ref 34–48)
HEMOGLOBIN: 9.4 G/DL (ref 11.5–15.5)
IMMATURE GRANULOCYTES #: 0.02 E9/L
IMMATURE GRANULOCYTES %: 0.3 % (ref 0–5)
LYMPHOCYTES ABSOLUTE: 1.6 E9/L (ref 1.5–4)
LYMPHOCYTES RELATIVE PERCENT: 25.5 % (ref 20–42)
MCH RBC QN AUTO: 29.7 PG (ref 26–35)
MCHC RBC AUTO-ENTMCNC: 32.2 % (ref 32–34.5)
MCV RBC AUTO: 92.4 FL (ref 80–99.9)
MONOCYTES ABSOLUTE: 0.81 E9/L (ref 0.1–0.95)
MONOCYTES RELATIVE PERCENT: 12.9 % (ref 2–12)
NEUTROPHILS ABSOLUTE: 3.45 E9/L (ref 1.8–7.3)
NEUTROPHILS RELATIVE PERCENT: 54.9 % (ref 43–80)
PDW BLD-RTO: 14.9 FL (ref 11.5–15)
PLATELET # BLD: 219 E9/L (ref 130–450)
PMV BLD AUTO: 9.2 FL (ref 7–12)
POTASSIUM REFLEX MAGNESIUM: 4.2 MMOL/L (ref 3.5–5)
RBC # BLD: 3.16 E12/L (ref 3.5–5.5)
SODIUM BLD-SCNC: 139 MMOL/L (ref 132–146)
URINE CULTURE, ROUTINE: NORMAL
WBC # BLD: 6.3 E9/L (ref 4.5–11.5)

## 2018-12-04 PROCEDURE — 6360000002 HC RX W HCPCS: Performed by: NURSE PRACTITIONER

## 2018-12-04 PROCEDURE — 80048 BASIC METABOLIC PNL TOTAL CA: CPT

## 2018-12-04 PROCEDURE — 2700000000 HC OXYGEN THERAPY PER DAY

## 2018-12-04 PROCEDURE — 36415 COLL VENOUS BLD VENIPUNCTURE: CPT

## 2018-12-04 PROCEDURE — 85025 COMPLETE CBC W/AUTO DIFF WBC: CPT

## 2018-12-04 PROCEDURE — 6370000000 HC RX 637 (ALT 250 FOR IP): Performed by: NURSE PRACTITIONER

## 2018-12-04 PROCEDURE — 2580000003 HC RX 258: Performed by: HOSPITALIST

## 2018-12-04 PROCEDURE — 6370000000 HC RX 637 (ALT 250 FOR IP): Performed by: HOSPITALIST

## 2018-12-04 RX ORDER — MORPHINE SULFATE 4 MG/ML
4 INJECTION, SOLUTION INTRAMUSCULAR; INTRAVENOUS
Status: DISCONTINUED | OUTPATIENT
Start: 2018-12-04 | End: 2018-12-04 | Stop reason: HOSPADM

## 2018-12-04 RX ORDER — MORPHINE SULFATE 2 MG/ML
2 INJECTION, SOLUTION INTRAMUSCULAR; INTRAVENOUS
Status: DISCONTINUED | OUTPATIENT
Start: 2018-12-04 | End: 2018-12-04 | Stop reason: HOSPADM

## 2018-12-04 RX ADMIN — HEPARIN SODIUM 5000 UNITS: 10000 INJECTION INTRAVENOUS; SUBCUTANEOUS at 09:49

## 2018-12-04 RX ADMIN — HYDRALAZINE HYDROCHLORIDE 10 MG: 10 TABLET, FILM COATED ORAL at 11:16

## 2018-12-04 RX ADMIN — Medication 10 ML: at 09:48

## 2018-12-04 RX ADMIN — LABETALOL HYDROCHLORIDE 100 MG: 100 TABLET, FILM COATED ORAL at 09:48

## 2018-12-04 RX ADMIN — HYDROCHLOROTHIAZIDE 6.25 MG: 12.5 TABLET ORAL at 09:48

## 2018-12-04 RX ADMIN — PRAVASTATIN SODIUM 20 MG: 20 TABLET ORAL at 09:48

## 2018-12-04 RX ADMIN — HEPARIN SODIUM 5000 UNITS: 10000 INJECTION INTRAVENOUS; SUBCUTANEOUS at 00:14

## 2018-12-04 RX ADMIN — HYDRALAZINE HYDROCHLORIDE 10 MG: 10 TABLET, FILM COATED ORAL at 00:13

## 2018-12-04 RX ADMIN — ESCITALOPRAM OXALATE 5 MG: 10 TABLET, FILM COATED ORAL at 09:48

## 2018-12-04 ASSESSMENT — PAIN SCALES - GENERAL: PAINLEVEL_OUTOF10: 0

## 2018-12-17 ENCOUNTER — HOSPITAL ENCOUNTER (OUTPATIENT)
Age: 83
Discharge: HOME | End: 2018-12-19
Payer: MEDICARE

## 2018-12-17 LAB
ALBUMIN SERPL-MCNC: 4.2 G/DL (ref 3.5–5.2)
ALP BLD-CCNC: 238 U/L (ref 35–104)
ALT SERPL-CCNC: 40 U/L (ref 0–32)
ANION GAP SERPL CALCULATED.3IONS-SCNC: 20 MMOL/L (ref 7–16)
AST SERPL-CCNC: 39 U/L (ref 0–31)
BASOPHILS ABSOLUTE: 0.02 E9/L (ref 0–0.2)
BASOPHILS RELATIVE PERCENT: 0.3 % (ref 0–2)
BILIRUB SERPL-MCNC: 0.2 MG/DL (ref 0–1.2)
BUN BLDV-MCNC: 29 MG/DL (ref 8–23)
CALCIUM SERPL-MCNC: 9.8 MG/DL (ref 8.6–10.2)
CHLORIDE BLD-SCNC: 102 MMOL/L (ref 98–107)
CO2: 21 MMOL/L (ref 22–29)
CREAT SERPL-MCNC: 1.5 MG/DL (ref 0.5–1)
EOSINOPHILS ABSOLUTE: 0.27 E9/L (ref 0.05–0.5)
EOSINOPHILS RELATIVE PERCENT: 4 % (ref 0–6)
GFR AFRICAN AMERICAN: 40
GFR NON-AFRICAN AMERICAN: 33 ML/MIN/1.73
GLUCOSE BLD-MCNC: 86 MG/DL (ref 74–99)
HCT VFR BLD CALC: 33.3 % (ref 34–48)
HEMOGLOBIN: 10.3 G/DL (ref 11.5–15.5)
IMMATURE GRANULOCYTES #: 0.02 E9/L
IMMATURE GRANULOCYTES %: 0.3 % (ref 0–5)
LYMPHOCYTES ABSOLUTE: 1.58 E9/L (ref 1.5–4)
LYMPHOCYTES RELATIVE PERCENT: 23.5 % (ref 20–42)
MCH RBC QN AUTO: 29 PG (ref 26–35)
MCHC RBC AUTO-ENTMCNC: 30.9 % (ref 32–34.5)
MCV RBC AUTO: 93.8 FL (ref 80–99.9)
MONOCYTES ABSOLUTE: 0.64 E9/L (ref 0.1–0.95)
MONOCYTES RELATIVE PERCENT: 9.5 % (ref 2–12)
NEUTROPHILS ABSOLUTE: 4.19 E9/L (ref 1.8–7.3)
NEUTROPHILS RELATIVE PERCENT: 62.4 % (ref 43–80)
PDW BLD-RTO: 15.4 FL (ref 11.5–15)
PLATELET # BLD: 352 E9/L (ref 130–450)
PMV BLD AUTO: 8.9 FL (ref 7–12)
POTASSIUM SERPL-SCNC: 4.8 MMOL/L (ref 3.5–5)
RBC # BLD: 3.55 E12/L (ref 3.5–5.5)
SODIUM BLD-SCNC: 143 MMOL/L (ref 132–146)
TOTAL PROTEIN: 7.3 G/DL (ref 6.4–8.3)
WBC # BLD: 6.7 E9/L (ref 4.5–11.5)

## 2018-12-17 PROCEDURE — 85025 COMPLETE CBC W/AUTO DIFF WBC: CPT

## 2018-12-17 PROCEDURE — 80053 COMPREHEN METABOLIC PANEL: CPT

## 2018-12-21 ENCOUNTER — HOSPITAL ENCOUNTER (OUTPATIENT)
Dept: MRI IMAGING | Age: 83
Discharge: HOME | End: 2018-12-23
Payer: MEDICARE

## 2018-12-21 DIAGNOSIS — M54.50 LUMBAR PAIN: ICD-10-CM

## 2018-12-21 DIAGNOSIS — M54.6 PAIN IN THORACIC SPINE: ICD-10-CM

## 2018-12-21 PROCEDURE — 72148 MRI LUMBAR SPINE W/O DYE: CPT

## 2018-12-21 PROCEDURE — 72146 MRI CHEST SPINE W/O DYE: CPT

## 2018-12-26 ENCOUNTER — TELEPHONE (OUTPATIENT)
Dept: NEUROSURGERY | Age: 83
End: 2018-12-26

## 2018-12-26 DIAGNOSIS — S22.040D CLOSED WEDGE COMPRESSION FRACTURE OF FOURTH THORACIC VERTEBRA WITH ROUTINE HEALING, SUBSEQUENT ENCOUNTER: Primary | ICD-10-CM

## 2018-12-28 ENCOUNTER — APPOINTMENT (OUTPATIENT)
Dept: GENERAL RADIOLOGY | Age: 83
DRG: 897 | End: 2018-12-28
Payer: MEDICARE

## 2018-12-28 ENCOUNTER — APPOINTMENT (OUTPATIENT)
Dept: CT IMAGING | Age: 83
DRG: 897 | End: 2018-12-28
Payer: MEDICARE

## 2018-12-28 ENCOUNTER — HOSPITAL ENCOUNTER (INPATIENT)
Age: 83
LOS: 4 days | Discharge: ANOTHER HOSPITAL | DRG: 897 | End: 2019-01-01
Attending: EMERGENCY MEDICINE | Admitting: INTERNAL MEDICINE
Payer: MEDICARE

## 2018-12-28 DIAGNOSIS — R44.3 HALLUCINATIONS: Primary | ICD-10-CM

## 2018-12-28 DIAGNOSIS — T50.905A ADVERSE EFFECT OF DRUG, INITIAL ENCOUNTER: ICD-10-CM

## 2018-12-28 PROBLEM — E86.0 DEHYDRATION: Status: ACTIVE | Noted: 2018-12-28

## 2018-12-28 PROBLEM — F05 ACUTE HYPERACTIVE DELIRIUM DUE TO MULTIPLE ETIOLOGIES: Status: ACTIVE | Noted: 2018-12-28

## 2018-12-28 PROBLEM — R41.82 ALTERED MENTAL STATUS: Status: ACTIVE | Noted: 2018-12-28

## 2018-12-28 PROBLEM — M81.8 ADULT IDIOPATHIC GENERALIZED OSTEOPOROSIS: Chronic | Status: ACTIVE | Noted: 2018-12-28

## 2018-12-28 PROBLEM — M48.54XA NON-TRAUMATIC COMPRESSION FRACTURE OF ELEVENTH THORACIC VERTEBRA (HCC): Status: ACTIVE | Noted: 2018-12-28

## 2018-12-28 LAB
ACETAMINOPHEN LEVEL: <5 MCG/ML (ref 10–30)
ALBUMIN SERPL-MCNC: 3.7 G/DL (ref 3.5–5.2)
ALP BLD-CCNC: 216 U/L (ref 35–104)
ALT SERPL-CCNC: 27 U/L (ref 0–32)
AMMONIA: 20 UMOL/L (ref 11–51)
AMPHETAMINE SCREEN, URINE: NOT DETECTED
ANION GAP SERPL CALCULATED.3IONS-SCNC: 13 MMOL/L (ref 7–16)
APTT: 29.3 SEC (ref 24.5–35.1)
AST SERPL-CCNC: 38 U/L (ref 0–31)
BARBITURATE SCREEN URINE: NOT DETECTED
BASOPHILS ABSOLUTE: 0.02 E9/L (ref 0–0.2)
BASOPHILS RELATIVE PERCENT: 0.3 % (ref 0–2)
BENZODIAZEPINE SCREEN, URINE: NOT DETECTED
BILIRUB SERPL-MCNC: 0.7 MG/DL (ref 0–1.2)
BILIRUBIN URINE: NEGATIVE
BLOOD, URINE: NEGATIVE
BUN BLDV-MCNC: 26 MG/DL (ref 8–23)
CALCIUM SERPL-MCNC: 8.8 MG/DL (ref 8.6–10.2)
CANNABINOID SCREEN URINE: NOT DETECTED
CHLORIDE BLD-SCNC: 103 MMOL/L (ref 98–107)
CHP ED QC CHECK: YES
CLARITY: CLEAR
CO2: 24 MMOL/L (ref 22–29)
COCAINE METABOLITE SCREEN URINE: NOT DETECTED
COLOR: NORMAL
CREAT SERPL-MCNC: 1.8 MG/DL (ref 0.5–1)
EKG ATRIAL RATE: 75 BPM
EKG P AXIS: 25 DEGREES
EKG P-R INTERVAL: 134 MS
EKG Q-T INTERVAL: 382 MS
EKG QRS DURATION: 92 MS
EKG QTC CALCULATION (BAZETT): 426 MS
EKG R AXIS: -18 DEGREES
EKG T AXIS: 10 DEGREES
EKG VENTRICULAR RATE: 75 BPM
EOSINOPHILS ABSOLUTE: 0.23 E9/L (ref 0.05–0.5)
EOSINOPHILS RELATIVE PERCENT: 3.4 % (ref 0–6)
ETHANOL: <10 MG/DL (ref 0–0.08)
GFR AFRICAN AMERICAN: 33
GFR NON-AFRICAN AMERICAN: 27 ML/MIN/1.73
GLUCOSE BLD-MCNC: 89 MG/DL (ref 74–99)
GLUCOSE BLD-MCNC: 98 MG/DL
GLUCOSE URINE: NEGATIVE MG/DL
HCT VFR BLD CALC: 29.6 % (ref 34–48)
HEMOGLOBIN: 9.8 G/DL (ref 11.5–15.5)
IMMATURE GRANULOCYTES #: 0.02 E9/L
IMMATURE GRANULOCYTES %: 0.3 % (ref 0–5)
INR BLD: 1
KETONES, URINE: NEGATIVE MG/DL
LEUKOCYTE ESTERASE, URINE: NEGATIVE
LYMPHOCYTES ABSOLUTE: 1.66 E9/L (ref 1.5–4)
LYMPHOCYTES RELATIVE PERCENT: 24.5 % (ref 20–42)
MCH RBC QN AUTO: 29.6 PG (ref 26–35)
MCHC RBC AUTO-ENTMCNC: 33.1 % (ref 32–34.5)
MCV RBC AUTO: 89.4 FL (ref 80–99.9)
METER GLUCOSE: 98 MG/DL (ref 74–99)
METHADONE SCREEN, URINE: NOT DETECTED
MONOCYTES ABSOLUTE: 1.23 E9/L (ref 0.1–0.95)
MONOCYTES RELATIVE PERCENT: 18.1 % (ref 2–12)
NEUTROPHILS ABSOLUTE: 3.62 E9/L (ref 1.8–7.3)
NEUTROPHILS RELATIVE PERCENT: 53.4 % (ref 43–80)
NITRITE, URINE: NEGATIVE
OPIATE SCREEN URINE: NOT DETECTED
PDW BLD-RTO: 14.9 FL (ref 11.5–15)
PH UA: 6.5 (ref 5–9)
PHENCYCLIDINE SCREEN URINE: NOT DETECTED
PLATELET # BLD: 249 E9/L (ref 130–450)
PMV BLD AUTO: 8.7 FL (ref 7–12)
POTASSIUM SERPL-SCNC: 3.7 MMOL/L (ref 3.5–5)
PROPOXYPHENE SCREEN: NOT DETECTED
PROTEIN UA: NEGATIVE MG/DL
PROTHROMBIN TIME: 11.8 SEC (ref 9.3–12.4)
RBC # BLD: 3.31 E12/L (ref 3.5–5.5)
SALICYLATE, SERUM: <0.3 MG/DL (ref 0–30)
SODIUM BLD-SCNC: 140 MMOL/L (ref 132–146)
SPECIFIC GRAVITY UA: <=1.005 (ref 1–1.03)
TOTAL PROTEIN: 6.8 G/DL (ref 6.4–8.3)
TRICYCLIC ANTIDEPRESSANTS SCREEN SERUM: NEGATIVE NG/ML
TROPONIN: 0.09 NG/ML (ref 0–0.03)
UROBILINOGEN, URINE: 1 E.U./DL
WBC # BLD: 6.8 E9/L (ref 4.5–11.5)

## 2018-12-28 PROCEDURE — 2500000003 HC RX 250 WO HCPCS: Performed by: INTERNAL MEDICINE

## 2018-12-28 PROCEDURE — 82140 ASSAY OF AMMONIA: CPT

## 2018-12-28 PROCEDURE — 36415 COLL VENOUS BLD VENIPUNCTURE: CPT

## 2018-12-28 PROCEDURE — 87088 URINE BACTERIA CULTURE: CPT

## 2018-12-28 PROCEDURE — 93005 ELECTROCARDIOGRAM TRACING: CPT | Performed by: EMERGENCY MEDICINE

## 2018-12-28 PROCEDURE — 82962 GLUCOSE BLOOD TEST: CPT

## 2018-12-28 PROCEDURE — 84484 ASSAY OF TROPONIN QUANT: CPT

## 2018-12-28 PROCEDURE — 6360000002 HC RX W HCPCS: Performed by: EMERGENCY MEDICINE

## 2018-12-28 PROCEDURE — 85025 COMPLETE CBC W/AUTO DIFF WBC: CPT

## 2018-12-28 PROCEDURE — G0480 DRUG TEST DEF 1-7 CLASSES: HCPCS

## 2018-12-28 PROCEDURE — 70450 CT HEAD/BRAIN W/O DYE: CPT

## 2018-12-28 PROCEDURE — 6370000000 HC RX 637 (ALT 250 FOR IP): Performed by: INTERNAL MEDICINE

## 2018-12-28 PROCEDURE — 99285 EMERGENCY DEPT VISIT HI MDM: CPT

## 2018-12-28 PROCEDURE — 81003 URINALYSIS AUTO W/O SCOPE: CPT

## 2018-12-28 PROCEDURE — 96374 THER/PROPH/DIAG INJ IV PUSH: CPT

## 2018-12-28 PROCEDURE — 80053 COMPREHEN METABOLIC PANEL: CPT

## 2018-12-28 PROCEDURE — 85610 PROTHROMBIN TIME: CPT

## 2018-12-28 PROCEDURE — 80307 DRUG TEST PRSMV CHEM ANLYZR: CPT

## 2018-12-28 PROCEDURE — 2580000003 HC RX 258: Performed by: INTERNAL MEDICINE

## 2018-12-28 PROCEDURE — 85730 THROMBOPLASTIN TIME PARTIAL: CPT

## 2018-12-28 PROCEDURE — 71045 X-RAY EXAM CHEST 1 VIEW: CPT

## 2018-12-28 PROCEDURE — 1200000000 HC SEMI PRIVATE

## 2018-12-28 RX ORDER — ROPINIROLE 0.5 MG/1
0.5 TABLET, FILM COATED ORAL NIGHTLY
Status: ON HOLD | COMMUNITY
End: 2019-01-01 | Stop reason: HOSPADM

## 2018-12-28 RX ORDER — SODIUM CHLORIDE 9 MG/ML
INJECTION, SOLUTION INTRAVENOUS CONTINUOUS
Status: DISCONTINUED | OUTPATIENT
Start: 2018-12-28 | End: 2018-12-28

## 2018-12-28 RX ORDER — HYDROCHLOROTHIAZIDE 25 MG/1
25 TABLET ORAL DAILY
Status: ON HOLD | COMMUNITY
End: 2019-01-01 | Stop reason: HOSPADM

## 2018-12-28 RX ORDER — SODIUM CHLORIDE 0.9 % (FLUSH) 0.9 %
10 SYRINGE (ML) INJECTION EVERY 12 HOURS SCHEDULED
Status: DISCONTINUED | OUTPATIENT
Start: 2018-12-28 | End: 2019-01-01 | Stop reason: HOSPADM

## 2018-12-28 RX ORDER — HYDRALAZINE HYDROCHLORIDE 20 MG/ML
10 INJECTION INTRAMUSCULAR; INTRAVENOUS ONCE
Status: COMPLETED | OUTPATIENT
Start: 2018-12-28 | End: 2018-12-28

## 2018-12-28 RX ORDER — LABETALOL 200 MG/1
100 TABLET, FILM COATED ORAL 2 TIMES DAILY
Status: DISCONTINUED | OUTPATIENT
Start: 2018-12-28 | End: 2019-01-01 | Stop reason: HOSPADM

## 2018-12-28 RX ORDER — HYDRALAZINE HYDROCHLORIDE 25 MG/1
25 TABLET, FILM COATED ORAL EVERY 12 HOURS SCHEDULED
Status: DISCONTINUED | OUTPATIENT
Start: 2018-12-28 | End: 2018-12-29

## 2018-12-28 RX ORDER — SODIUM CHLORIDE 0.9 % (FLUSH) 0.9 %
10 SYRINGE (ML) INJECTION PRN
Status: DISCONTINUED | OUTPATIENT
Start: 2018-12-28 | End: 2019-01-01 | Stop reason: HOSPADM

## 2018-12-28 RX ORDER — PRAVASTATIN SODIUM 20 MG
20 TABLET ORAL DAILY
Status: DISCONTINUED | OUTPATIENT
Start: 2018-12-28 | End: 2018-12-28

## 2018-12-28 RX ORDER — DEXTROSE, SODIUM CHLORIDE, AND POTASSIUM CHLORIDE 5; .45; .15 G/100ML; G/100ML; G/100ML
INJECTION INTRAVENOUS CONTINUOUS
Status: DISCONTINUED | OUTPATIENT
Start: 2018-12-28 | End: 2018-12-29

## 2018-12-28 RX ORDER — ACETAMINOPHEN 325 MG/1
650 TABLET ORAL EVERY 4 HOURS PRN
Status: DISCONTINUED | OUTPATIENT
Start: 2018-12-28 | End: 2019-01-01 | Stop reason: HOSPADM

## 2018-12-28 RX ORDER — TRAMADOL HYDROCHLORIDE 50 MG/1
50 TABLET ORAL EVERY 6 HOURS PRN
Status: ON HOLD | COMMUNITY
End: 2019-01-01 | Stop reason: HOSPADM

## 2018-12-28 RX ADMIN — Medication 10 ML: at 14:43

## 2018-12-28 RX ADMIN — LABETALOL HCL 100 MG: 200 TABLET, FILM COATED ORAL at 20:31

## 2018-12-28 RX ADMIN — POTASSIUM CHLORIDE, DEXTROSE MONOHYDRATE AND SODIUM CHLORIDE: 150; 5; 450 INJECTION, SOLUTION INTRAVENOUS at 21:57

## 2018-12-28 RX ADMIN — HYDRALAZINE HYDROCHLORIDE 10 MG: 20 INJECTION INTRAMUSCULAR; INTRAVENOUS at 07:05

## 2018-12-28 RX ADMIN — HYDRALAZINE HYDROCHLORIDE 25 MG: 25 TABLET, FILM COATED ORAL at 20:31

## 2018-12-28 RX ADMIN — SODIUM CHLORIDE: 9 INJECTION, SOLUTION INTRAVENOUS at 14:31

## 2018-12-28 RX ADMIN — PRAVASTATIN SODIUM 20 MG: 20 TABLET ORAL at 14:25

## 2018-12-28 ASSESSMENT — ENCOUNTER SYMPTOMS
NAUSEA: 0
SHORTNESS OF BREATH: 0
BACK PAIN: 0
SINUS PRESSURE: 0
DIARRHEA: 0
COUGH: 0
EYE REDNESS: 0
EYE DISCHARGE: 0
ABDOMINAL DISTENTION: 0
SORE THROAT: 0
EYE PAIN: 0
VOMITING: 0
WHEEZING: 0

## 2018-12-28 ASSESSMENT — PAIN SCALES - GENERAL
PAINLEVEL_OUTOF10: 0
PAINLEVEL_OUTOF10: 0

## 2018-12-29 LAB
ANION GAP SERPL CALCULATED.3IONS-SCNC: 12 MMOL/L (ref 7–16)
BUN BLDV-MCNC: 19 MG/DL (ref 8–23)
CALCIUM SERPL-MCNC: 8.6 MG/DL (ref 8.6–10.2)
CHLORIDE BLD-SCNC: 106 MMOL/L (ref 98–107)
CO2: 24 MMOL/L (ref 22–29)
CREAT SERPL-MCNC: 1.4 MG/DL (ref 0.5–1)
FOLATE: 17.5 NG/ML (ref 4.8–24.2)
GFR AFRICAN AMERICAN: 43
GFR NON-AFRICAN AMERICAN: 36 ML/MIN/1.73
GLUCOSE BLD-MCNC: 94 MG/DL (ref 74–99)
POTASSIUM SERPL-SCNC: 4.2 MMOL/L (ref 3.5–5)
SODIUM BLD-SCNC: 142 MMOL/L (ref 132–146)
T4 FREE: 1.37 NG/DL (ref 0.93–1.7)
TSH SERPL DL<=0.05 MIU/L-ACNC: 1.05 UIU/ML (ref 0.27–4.2)
VITAMIN B-12: 861 PG/ML (ref 211–946)

## 2018-12-29 PROCEDURE — 80048 BASIC METABOLIC PNL TOTAL CA: CPT

## 2018-12-29 PROCEDURE — 84439 ASSAY OF FREE THYROXINE: CPT

## 2018-12-29 PROCEDURE — 36415 COLL VENOUS BLD VENIPUNCTURE: CPT

## 2018-12-29 PROCEDURE — 82746 ASSAY OF FOLIC ACID SERUM: CPT

## 2018-12-29 PROCEDURE — 6370000000 HC RX 637 (ALT 250 FOR IP): Performed by: INTERNAL MEDICINE

## 2018-12-29 PROCEDURE — 83921 ORGANIC ACID SINGLE QUANT: CPT

## 2018-12-29 PROCEDURE — 84443 ASSAY THYROID STIM HORMONE: CPT

## 2018-12-29 PROCEDURE — 2580000003 HC RX 258: Performed by: INTERNAL MEDICINE

## 2018-12-29 PROCEDURE — 1200000000 HC SEMI PRIVATE

## 2018-12-29 PROCEDURE — 82607 VITAMIN B-12: CPT

## 2018-12-29 PROCEDURE — 6360000002 HC RX W HCPCS: Performed by: INTERNAL MEDICINE

## 2018-12-29 RX ORDER — HYDRALAZINE HYDROCHLORIDE 25 MG/1
25 TABLET, FILM COATED ORAL EVERY 8 HOURS SCHEDULED
Status: DISCONTINUED | OUTPATIENT
Start: 2018-12-29 | End: 2019-01-01 | Stop reason: HOSPADM

## 2018-12-29 RX ORDER — HEPARIN SODIUM 5000 [USP'U]/ML
5000 INJECTION, SOLUTION INTRAVENOUS; SUBCUTANEOUS EVERY 12 HOURS
Status: DISCONTINUED | OUTPATIENT
Start: 2018-12-29 | End: 2019-01-01 | Stop reason: HOSPADM

## 2018-12-29 RX ADMIN — LABETALOL HCL 100 MG: 200 TABLET, FILM COATED ORAL at 10:29

## 2018-12-29 RX ADMIN — HEPARIN SODIUM 5000 UNITS: 5000 INJECTION INTRAVENOUS; SUBCUTANEOUS at 14:55

## 2018-12-29 RX ADMIN — LABETALOL HCL 100 MG: 200 TABLET, FILM COATED ORAL at 21:30

## 2018-12-29 RX ADMIN — Medication 10 ML: at 21:30

## 2018-12-29 RX ADMIN — ACETAMINOPHEN 650 MG: 325 TABLET, FILM COATED ORAL at 01:22

## 2018-12-29 RX ADMIN — HYDRALAZINE HYDROCHLORIDE 25 MG: 25 TABLET, FILM COATED ORAL at 10:28

## 2018-12-29 RX ADMIN — HYDRALAZINE HYDROCHLORIDE 25 MG: 25 TABLET, FILM COATED ORAL at 22:15

## 2018-12-29 RX ADMIN — HEPARIN SODIUM 5000 UNITS: 5000 INJECTION INTRAVENOUS; SUBCUTANEOUS at 21:30

## 2018-12-29 ASSESSMENT — PAIN SCALES - GENERAL
PAINLEVEL_OUTOF10: 5
PAINLEVEL_OUTOF10: 0
PAINLEVEL_OUTOF10: 0

## 2018-12-29 ASSESSMENT — PAIN DESCRIPTION - LOCATION: LOCATION: BACK

## 2018-12-29 ASSESSMENT — PAIN DESCRIPTION - PAIN TYPE
TYPE: ACUTE PAIN
TYPE: ACUTE PAIN

## 2018-12-29 ASSESSMENT — PAIN DESCRIPTION - PROGRESSION: CLINICAL_PROGRESSION: NOT CHANGED

## 2018-12-29 ASSESSMENT — PAIN DESCRIPTION - FREQUENCY: FREQUENCY: CONTINUOUS

## 2018-12-29 ASSESSMENT — PAIN DESCRIPTION - ONSET: ONSET: ON-GOING

## 2018-12-29 ASSESSMENT — PAIN DESCRIPTION - DESCRIPTORS: DESCRIPTORS: ACHING;CONSTANT;DISCOMFORT

## 2018-12-30 ENCOUNTER — APPOINTMENT (OUTPATIENT)
Dept: CT IMAGING | Age: 83
DRG: 897 | End: 2018-12-30
Payer: MEDICARE

## 2018-12-30 LAB
METER GLUCOSE: 144 MG/DL (ref 74–99)
URINE CULTURE, ROUTINE: NORMAL

## 2018-12-30 PROCEDURE — 6360000002 HC RX W HCPCS: Performed by: INTERNAL MEDICINE

## 2018-12-30 PROCEDURE — 2580000003 HC RX 258: Performed by: INTERNAL MEDICINE

## 2018-12-30 PROCEDURE — 6370000000 HC RX 637 (ALT 250 FOR IP): Performed by: INTERNAL MEDICINE

## 2018-12-30 PROCEDURE — 97116 GAIT TRAINING THERAPY: CPT | Performed by: PHYSICAL THERAPIST

## 2018-12-30 PROCEDURE — G8978 MOBILITY CURRENT STATUS: HCPCS | Performed by: PHYSICAL THERAPIST

## 2018-12-30 PROCEDURE — 1200000000 HC SEMI PRIVATE

## 2018-12-30 PROCEDURE — G8979 MOBILITY GOAL STATUS: HCPCS | Performed by: PHYSICAL THERAPIST

## 2018-12-30 PROCEDURE — 70450 CT HEAD/BRAIN W/O DYE: CPT

## 2018-12-30 PROCEDURE — 82962 GLUCOSE BLOOD TEST: CPT

## 2018-12-30 PROCEDURE — 97161 PT EVAL LOW COMPLEX 20 MIN: CPT | Performed by: PHYSICAL THERAPIST

## 2018-12-30 RX ADMIN — HEPARIN SODIUM 5000 UNITS: 5000 INJECTION INTRAVENOUS; SUBCUTANEOUS at 20:19

## 2018-12-30 RX ADMIN — HYDRALAZINE HYDROCHLORIDE 25 MG: 25 TABLET, FILM COATED ORAL at 22:46

## 2018-12-30 RX ADMIN — HYDRALAZINE HYDROCHLORIDE 25 MG: 25 TABLET, FILM COATED ORAL at 05:50

## 2018-12-30 RX ADMIN — Medication 10 ML: at 20:20

## 2018-12-30 RX ADMIN — HEPARIN SODIUM 5000 UNITS: 5000 INJECTION INTRAVENOUS; SUBCUTANEOUS at 08:36

## 2018-12-30 RX ADMIN — LABETALOL HCL 100 MG: 200 TABLET, FILM COATED ORAL at 20:20

## 2018-12-30 RX ADMIN — HYDRALAZINE HYDROCHLORIDE 25 MG: 25 TABLET, FILM COATED ORAL at 15:46

## 2018-12-30 RX ADMIN — ACETAMINOPHEN 650 MG: 325 TABLET, FILM COATED ORAL at 13:11

## 2018-12-30 RX ADMIN — LABETALOL HCL 100 MG: 200 TABLET, FILM COATED ORAL at 08:34

## 2018-12-30 ASSESSMENT — PAIN SCALES - GENERAL
PAINLEVEL_OUTOF10: 0
PAINLEVEL_OUTOF10: 10
PAINLEVEL_OUTOF10: 0

## 2018-12-31 LAB
ANION GAP SERPL CALCULATED.3IONS-SCNC: 11 MMOL/L (ref 7–16)
BUN BLDV-MCNC: 24 MG/DL (ref 8–23)
CALCIUM SERPL-MCNC: 8.9 MG/DL (ref 8.6–10.2)
CHLORIDE BLD-SCNC: 106 MMOL/L (ref 98–107)
CO2: 24 MMOL/L (ref 22–29)
CREAT SERPL-MCNC: 1.5 MG/DL (ref 0.5–1)
GFR AFRICAN AMERICAN: 40
GFR NON-AFRICAN AMERICAN: 33 ML/MIN/1.73
GLUCOSE BLD-MCNC: 89 MG/DL (ref 74–99)
POTASSIUM SERPL-SCNC: 4.2 MMOL/L (ref 3.5–5)
SODIUM BLD-SCNC: 141 MMOL/L (ref 132–146)

## 2018-12-31 PROCEDURE — 6360000002 HC RX W HCPCS: Performed by: INTERNAL MEDICINE

## 2018-12-31 PROCEDURE — 97535 SELF CARE MNGMENT TRAINING: CPT

## 2018-12-31 PROCEDURE — G8988 SELF CARE GOAL STATUS: HCPCS

## 2018-12-31 PROCEDURE — 36415 COLL VENOUS BLD VENIPUNCTURE: CPT

## 2018-12-31 PROCEDURE — 2580000003 HC RX 258: Performed by: INTERNAL MEDICINE

## 2018-12-31 PROCEDURE — G8987 SELF CARE CURRENT STATUS: HCPCS

## 2018-12-31 PROCEDURE — 92523 SPEECH SOUND LANG COMPREHEN: CPT

## 2018-12-31 PROCEDURE — 1200000000 HC SEMI PRIVATE

## 2018-12-31 PROCEDURE — 6370000000 HC RX 637 (ALT 250 FOR IP): Performed by: INTERNAL MEDICINE

## 2018-12-31 PROCEDURE — 97166 OT EVAL MOD COMPLEX 45 MIN: CPT

## 2018-12-31 PROCEDURE — 97530 THERAPEUTIC ACTIVITIES: CPT

## 2018-12-31 PROCEDURE — 80048 BASIC METABOLIC PNL TOTAL CA: CPT

## 2018-12-31 PROCEDURE — 97127 HC SP THER IVNTJ W/FOCUS COG FUNCJ: CPT

## 2018-12-31 RX ORDER — HYDROCHLOROTHIAZIDE 12.5 MG/1
12.5 TABLET ORAL DAILY
Status: DISCONTINUED | OUTPATIENT
Start: 2018-12-31 | End: 2019-01-01 | Stop reason: HOSPADM

## 2018-12-31 RX ADMIN — Medication 10 ML: at 20:54

## 2018-12-31 RX ADMIN — LABETALOL HCL 100 MG: 200 TABLET, FILM COATED ORAL at 20:53

## 2018-12-31 RX ADMIN — HYDRALAZINE HYDROCHLORIDE 25 MG: 25 TABLET, FILM COATED ORAL at 22:02

## 2018-12-31 RX ADMIN — ACETAMINOPHEN 650 MG: 325 TABLET, FILM COATED ORAL at 10:12

## 2018-12-31 RX ADMIN — HEPARIN SODIUM 5000 UNITS: 5000 INJECTION INTRAVENOUS; SUBCUTANEOUS at 20:54

## 2018-12-31 RX ADMIN — HYDRALAZINE HYDROCHLORIDE 25 MG: 25 TABLET, FILM COATED ORAL at 14:38

## 2018-12-31 RX ADMIN — HYDRALAZINE HYDROCHLORIDE 25 MG: 25 TABLET, FILM COATED ORAL at 06:13

## 2018-12-31 RX ADMIN — Medication 10 ML: at 09:12

## 2018-12-31 RX ADMIN — ACETAMINOPHEN 650 MG: 325 TABLET, FILM COATED ORAL at 19:26

## 2018-12-31 RX ADMIN — HEPARIN SODIUM 5000 UNITS: 5000 INJECTION INTRAVENOUS; SUBCUTANEOUS at 09:12

## 2018-12-31 RX ADMIN — HYDROCHLOROTHIAZIDE 12.5 MG: 12.5 TABLET ORAL at 09:11

## 2018-12-31 RX ADMIN — LABETALOL HCL 100 MG: 200 TABLET, FILM COATED ORAL at 09:11

## 2018-12-31 ASSESSMENT — PAIN SCALES - GENERAL
PAINLEVEL_OUTOF10: 0
PAINLEVEL_OUTOF10: 9
PAINLEVEL_OUTOF10: 6
PAINLEVEL_OUTOF10: 5

## 2018-12-31 ASSESSMENT — PAIN DESCRIPTION - DESCRIPTORS
DESCRIPTORS: ACHING;DISCOMFORT;SORE
DESCRIPTORS: ACHING;DISCOMFORT;SORE

## 2018-12-31 ASSESSMENT — PAIN DESCRIPTION - LOCATION
LOCATION: BACK
LOCATION: BACK

## 2018-12-31 ASSESSMENT — PAIN DESCRIPTION - PAIN TYPE
TYPE: CHRONIC PAIN
TYPE: CHRONIC PAIN

## 2018-12-31 ASSESSMENT — PAIN DESCRIPTION - FREQUENCY: FREQUENCY: CONTINUOUS

## 2018-12-31 ASSESSMENT — PAIN DESCRIPTION - ORIENTATION: ORIENTATION: MID

## 2019-01-01 VITALS
HEIGHT: 60 IN | BODY MASS INDEX: 26.5 KG/M2 | TEMPERATURE: 98.6 F | OXYGEN SATURATION: 98 % | SYSTOLIC BLOOD PRESSURE: 146 MMHG | DIASTOLIC BLOOD PRESSURE: 65 MMHG | WEIGHT: 135 LBS | HEART RATE: 80 BPM | RESPIRATION RATE: 18 BRPM

## 2019-01-01 PROCEDURE — 6370000000 HC RX 637 (ALT 250 FOR IP)

## 2019-01-01 PROCEDURE — 6370000000 HC RX 637 (ALT 250 FOR IP): Performed by: INTERNAL MEDICINE

## 2019-01-01 PROCEDURE — 2580000003 HC RX 258: Performed by: INTERNAL MEDICINE

## 2019-01-01 PROCEDURE — 6360000002 HC RX W HCPCS: Performed by: INTERNAL MEDICINE

## 2019-01-01 RX ORDER — HYDROCHLOROTHIAZIDE 12.5 MG/1
12.5 TABLET ORAL DAILY
Qty: 30 TABLET | Refills: 3 | Status: SHIPPED | OUTPATIENT
Start: 2019-01-02 | End: 2021-08-17

## 2019-01-01 RX ORDER — HYDRALAZINE HYDROCHLORIDE 25 MG/1
25 TABLET, FILM COATED ORAL EVERY 8 HOURS SCHEDULED
Qty: 90 TABLET | Refills: 3 | Status: SHIPPED | OUTPATIENT
Start: 2019-01-01 | End: 2020-08-03

## 2019-01-01 RX ADMIN — Medication 10 ML: at 11:59

## 2019-01-01 RX ADMIN — LABETALOL HCL 100 MG: 200 TABLET, FILM COATED ORAL at 12:00

## 2019-01-01 RX ADMIN — HYDROCHLOROTHIAZIDE 12.5 MG: 12.5 TABLET ORAL at 12:00

## 2019-01-01 RX ADMIN — HYDRALAZINE HYDROCHLORIDE 25 MG: 25 TABLET, FILM COATED ORAL at 14:52

## 2019-01-01 RX ADMIN — HEPARIN SODIUM 5000 UNITS: 5000 INJECTION INTRAVENOUS; SUBCUTANEOUS at 12:01

## 2019-01-01 ASSESSMENT — PAIN SCALES - GENERAL
PAINLEVEL_OUTOF10: 0
PAINLEVEL_OUTOF10: 0

## 2019-01-02 LAB — METHYLMALONIC ACID: 0.22 UMOL/L (ref 0–0.4)

## 2019-01-08 ENCOUNTER — TELEPHONE (OUTPATIENT)
Dept: NEUROSURGERY | Age: 84
End: 2019-01-08

## 2019-01-08 DIAGNOSIS — S22.080D CLOSED WEDGE COMPRESSION FRACTURE OF ELEVENTH THORACIC VERTEBRA WITH ROUTINE HEALING, SUBSEQUENT ENCOUNTER: Primary | ICD-10-CM

## 2019-01-27 PROBLEM — E86.0 DEHYDRATION: Status: RESOLVED | Noted: 2018-12-28 | Resolved: 2019-01-27

## 2019-01-30 DIAGNOSIS — G89.29 CHRONIC LOW BACK PAIN WITH SCIATICA, SCIATICA LATERALITY UNSPECIFIED, UNSPECIFIED BACK PAIN LATERALITY: Primary | Chronic | ICD-10-CM

## 2019-01-30 DIAGNOSIS — M54.40 CHRONIC LOW BACK PAIN WITH SCIATICA, SCIATICA LATERALITY UNSPECIFIED, UNSPECIFIED BACK PAIN LATERALITY: Primary | Chronic | ICD-10-CM

## 2019-02-14 ENCOUNTER — OFFICE VISIT (OUTPATIENT)
Dept: NEUROSURGERY | Age: 84
End: 2019-02-14
Payer: MEDICARE

## 2019-02-14 ENCOUNTER — HOSPITAL ENCOUNTER (OUTPATIENT)
Age: 84
Discharge: HOME | End: 2019-02-16
Payer: MEDICARE

## 2019-02-14 ENCOUNTER — HOSPITAL ENCOUNTER (OUTPATIENT)
Dept: GENERAL RADIOLOGY | Age: 84
Discharge: HOME | End: 2019-02-16
Payer: MEDICARE

## 2019-02-14 VITALS — DIASTOLIC BLOOD PRESSURE: 95 MMHG | SYSTOLIC BLOOD PRESSURE: 180 MMHG | HEART RATE: 66 BPM

## 2019-02-14 DIAGNOSIS — G89.29 CHRONIC LOW BACK PAIN WITH SCIATICA, SCIATICA LATERALITY UNSPECIFIED, UNSPECIFIED BACK PAIN LATERALITY: Chronic | ICD-10-CM

## 2019-02-14 DIAGNOSIS — M54.16 LUMBAR RADICULOPATHY: Primary | ICD-10-CM

## 2019-02-14 DIAGNOSIS — M54.40 CHRONIC LOW BACK PAIN WITH SCIATICA, SCIATICA LATERALITY UNSPECIFIED, UNSPECIFIED BACK PAIN LATERALITY: Chronic | ICD-10-CM

## 2019-02-14 PROCEDURE — G8399 PT W/DXA RESULTS DOCUMENT: HCPCS | Performed by: PHYSICIAN ASSISTANT

## 2019-02-14 PROCEDURE — 99212 OFFICE O/P EST SF 10 MIN: CPT | Performed by: PHYSICIAN ASSISTANT

## 2019-02-14 PROCEDURE — G8484 FLU IMMUNIZE NO ADMIN: HCPCS | Performed by: PHYSICIAN ASSISTANT

## 2019-02-14 PROCEDURE — 4040F PNEUMOC VAC/ADMIN/RCVD: CPT | Performed by: PHYSICIAN ASSISTANT

## 2019-02-14 PROCEDURE — G8427 DOCREV CUR MEDS BY ELIG CLIN: HCPCS | Performed by: PHYSICIAN ASSISTANT

## 2019-02-14 PROCEDURE — 1090F PRES/ABSN URINE INCON ASSESS: CPT | Performed by: PHYSICIAN ASSISTANT

## 2019-02-14 PROCEDURE — 72100 X-RAY EXAM L-S SPINE 2/3 VWS: CPT

## 2019-02-14 PROCEDURE — 1123F ACP DISCUSS/DSCN MKR DOCD: CPT | Performed by: PHYSICIAN ASSISTANT

## 2019-02-14 PROCEDURE — 1036F TOBACCO NON-USER: CPT | Performed by: PHYSICIAN ASSISTANT

## 2019-02-14 PROCEDURE — 1101F PT FALLS ASSESS-DOCD LE1/YR: CPT | Performed by: PHYSICIAN ASSISTANT

## 2019-02-14 PROCEDURE — G8419 CALC BMI OUT NRM PARAM NOF/U: HCPCS | Performed by: PHYSICIAN ASSISTANT

## 2019-05-23 ENCOUNTER — HOSPITAL ENCOUNTER (OUTPATIENT)
Age: 84
Discharge: HOME | End: 2019-05-25
Payer: MEDICARE

## 2019-05-23 LAB
ALBUMIN SERPL-MCNC: 4 G/DL (ref 3.5–5.2)
ALP BLD-CCNC: 214 U/L (ref 35–104)
ALT SERPL-CCNC: 44 U/L (ref 0–32)
ANION GAP SERPL CALCULATED.3IONS-SCNC: 13 MMOL/L (ref 7–16)
AST SERPL-CCNC: 50 U/L (ref 0–31)
BASOPHILS ABSOLUTE: 0.02 E9/L (ref 0–0.2)
BASOPHILS RELATIVE PERCENT: 0.3 % (ref 0–2)
BILIRUB SERPL-MCNC: 0.4 MG/DL (ref 0–1.2)
BUN BLDV-MCNC: 36 MG/DL (ref 8–23)
CALCIUM SERPL-MCNC: 10.1 MG/DL (ref 8.6–10.2)
CHLORIDE BLD-SCNC: 102 MMOL/L (ref 98–107)
CO2: 26 MMOL/L (ref 22–29)
CREAT SERPL-MCNC: 1.5 MG/DL (ref 0.5–1)
EOSINOPHILS ABSOLUTE: 0.18 E9/L (ref 0.05–0.5)
EOSINOPHILS RELATIVE PERCENT: 2.6 % (ref 0–6)
GFR AFRICAN AMERICAN: 40
GFR NON-AFRICAN AMERICAN: 33 ML/MIN/1.73
GLUCOSE BLD-MCNC: 70 MG/DL (ref 74–99)
HCT VFR BLD CALC: 34.4 % (ref 34–48)
HEMOGLOBIN: 10.9 G/DL (ref 11.5–15.5)
IMMATURE GRANULOCYTES #: 0.02 E9/L
IMMATURE GRANULOCYTES %: 0.3 % (ref 0–5)
LYMPHOCYTES ABSOLUTE: 1.61 E9/L (ref 1.5–4)
LYMPHOCYTES RELATIVE PERCENT: 23.5 % (ref 20–42)
MCH RBC QN AUTO: 30 PG (ref 26–35)
MCHC RBC AUTO-ENTMCNC: 31.7 % (ref 32–34.5)
MCV RBC AUTO: 94.8 FL (ref 80–99.9)
MONOCYTES ABSOLUTE: 0.63 E9/L (ref 0.1–0.95)
MONOCYTES RELATIVE PERCENT: 9.2 % (ref 2–12)
NEUTROPHILS ABSOLUTE: 4.4 E9/L (ref 1.8–7.3)
NEUTROPHILS RELATIVE PERCENT: 64.1 % (ref 43–80)
PDW BLD-RTO: 16.3 FL (ref 11.5–15)
PLATELET # BLD: 240 E9/L (ref 130–450)
PMV BLD AUTO: 9.4 FL (ref 7–12)
POTASSIUM SERPL-SCNC: 4.8 MMOL/L (ref 3.5–5)
RBC # BLD: 3.63 E12/L (ref 3.5–5.5)
SODIUM BLD-SCNC: 141 MMOL/L (ref 132–146)
TOTAL PROTEIN: 7.3 G/DL (ref 6.4–8.3)
TSH SERPL DL<=0.05 MIU/L-ACNC: 3.99 UIU/ML (ref 0.27–4.2)
WBC # BLD: 6.9 E9/L (ref 4.5–11.5)

## 2019-05-23 PROCEDURE — 85025 COMPLETE CBC W/AUTO DIFF WBC: CPT

## 2019-05-23 PROCEDURE — 80053 COMPREHEN METABOLIC PANEL: CPT

## 2019-05-23 PROCEDURE — 84443 ASSAY THYROID STIM HORMONE: CPT

## 2019-08-29 ENCOUNTER — HOSPITAL ENCOUNTER (OUTPATIENT)
Dept: GENERAL RADIOLOGY | Age: 84
Discharge: HOME | End: 2019-08-31
Payer: MEDICARE

## 2019-08-29 ENCOUNTER — HOSPITAL ENCOUNTER (OUTPATIENT)
Age: 84
Discharge: HOME | End: 2019-08-31
Payer: MEDICARE

## 2019-08-29 ENCOUNTER — OFFICE VISIT (OUTPATIENT)
Dept: NEUROSURGERY | Age: 84
End: 2019-08-29
Payer: MEDICARE

## 2019-08-29 VITALS
WEIGHT: 126 LBS | HEART RATE: 65 BPM | DIASTOLIC BLOOD PRESSURE: 96 MMHG | HEIGHT: 60 IN | BODY MASS INDEX: 24.74 KG/M2 | SYSTOLIC BLOOD PRESSURE: 188 MMHG

## 2019-08-29 DIAGNOSIS — M54.17 LUMBOSACRAL RADICULOPATHY: Primary | ICD-10-CM

## 2019-08-29 DIAGNOSIS — M54.16 LUMBAR RADICULOPATHY: Primary | ICD-10-CM

## 2019-08-29 DIAGNOSIS — M54.16 LUMBAR RADICULOPATHY: ICD-10-CM

## 2019-08-29 PROCEDURE — G8420 CALC BMI NORM PARAMETERS: HCPCS | Performed by: PHYSICIAN ASSISTANT

## 2019-08-29 PROCEDURE — 4040F PNEUMOC VAC/ADMIN/RCVD: CPT | Performed by: PHYSICIAN ASSISTANT

## 2019-08-29 PROCEDURE — 99212 OFFICE O/P EST SF 10 MIN: CPT | Performed by: PHYSICIAN ASSISTANT

## 2019-08-29 PROCEDURE — 1090F PRES/ABSN URINE INCON ASSESS: CPT | Performed by: PHYSICIAN ASSISTANT

## 2019-08-29 PROCEDURE — G8427 DOCREV CUR MEDS BY ELIG CLIN: HCPCS | Performed by: PHYSICIAN ASSISTANT

## 2019-08-29 PROCEDURE — 1123F ACP DISCUSS/DSCN MKR DOCD: CPT | Performed by: PHYSICIAN ASSISTANT

## 2019-08-29 PROCEDURE — 72100 X-RAY EXAM L-S SPINE 2/3 VWS: CPT

## 2019-08-29 PROCEDURE — 1036F TOBACCO NON-USER: CPT | Performed by: PHYSICIAN ASSISTANT

## 2019-08-29 PROCEDURE — G8399 PT W/DXA RESULTS DOCUMENT: HCPCS | Performed by: PHYSICIAN ASSISTANT

## 2019-08-29 RX ORDER — PRAMIPEXOLE DIHYDROCHLORIDE 0.12 MG/1
0.12 TABLET ORAL 3 TIMES DAILY
COMMUNITY
End: 2020-08-03

## 2020-05-11 ENCOUNTER — APPOINTMENT (OUTPATIENT)
Dept: GENERAL RADIOLOGY | Age: 85
DRG: 683 | End: 2020-05-11
Payer: MEDICARE

## 2020-05-11 ENCOUNTER — APPOINTMENT (OUTPATIENT)
Dept: CT IMAGING | Age: 85
DRG: 683 | End: 2020-05-11
Payer: MEDICARE

## 2020-05-11 ENCOUNTER — HOSPITAL ENCOUNTER (INPATIENT)
Age: 85
LOS: 3 days | Discharge: NURSING, REHAB OR LONG-TERM CARE | DRG: 683 | End: 2020-05-14
Attending: EMERGENCY MEDICINE | Admitting: INTERNAL MEDICINE
Payer: MEDICARE

## 2020-05-11 PROBLEM — S42.412A CLOSED DISPLACED SIMPLE SUPRACONDYLAR FRACTURE OF LEFT HUMERUS WITHOUT INTERCONDYLAR FRACTURE: Status: ACTIVE | Noted: 2020-05-11

## 2020-05-11 LAB
ABO/RH: NORMAL
ANION GAP SERPL CALCULATED.3IONS-SCNC: 11 MMOL/L (ref 7–16)
ANTIBODY SCREEN: NORMAL
BASOPHILS ABSOLUTE: 0.03 E9/L (ref 0–0.2)
BASOPHILS RELATIVE PERCENT: 0.4 % (ref 0–2)
BUN BLDV-MCNC: 51 MG/DL (ref 8–23)
CALCIUM SERPL-MCNC: 9.4 MG/DL (ref 8.6–10.2)
CHLORIDE BLD-SCNC: 105 MMOL/L (ref 98–107)
CO2: 24 MMOL/L (ref 22–29)
CREAT SERPL-MCNC: 1.9 MG/DL (ref 0.5–1)
EOSINOPHILS ABSOLUTE: 0.15 E9/L (ref 0.05–0.5)
EOSINOPHILS RELATIVE PERCENT: 2.1 % (ref 0–6)
GFR AFRICAN AMERICAN: 30
GFR NON-AFRICAN AMERICAN: 25 ML/MIN/1.73
GLUCOSE BLD-MCNC: 69 MG/DL (ref 74–99)
HCT VFR BLD CALC: 28.8 % (ref 34–48)
HEMOGLOBIN: 9.7 G/DL (ref 11.5–15.5)
IMMATURE GRANULOCYTES #: 0.05 E9/L
IMMATURE GRANULOCYTES %: 0.7 % (ref 0–5)
LYMPHOCYTES ABSOLUTE: 1.28 E9/L (ref 1.5–4)
LYMPHOCYTES RELATIVE PERCENT: 18.2 % (ref 20–42)
MCH RBC QN AUTO: 31.5 PG (ref 26–35)
MCHC RBC AUTO-ENTMCNC: 33.7 % (ref 32–34.5)
MCV RBC AUTO: 93.5 FL (ref 80–99.9)
MONOCYTES ABSOLUTE: 0.73 E9/L (ref 0.1–0.95)
MONOCYTES RELATIVE PERCENT: 10.4 % (ref 2–12)
NEUTROPHILS ABSOLUTE: 4.79 E9/L (ref 1.8–7.3)
NEUTROPHILS RELATIVE PERCENT: 68.2 % (ref 43–80)
PDW BLD-RTO: 16.1 FL (ref 11.5–15)
PLATELET # BLD: 175 E9/L (ref 130–450)
PMV BLD AUTO: 9.2 FL (ref 7–12)
POTASSIUM SERPL-SCNC: 4.9 MMOL/L (ref 3.5–5)
RBC # BLD: 3.08 E12/L (ref 3.5–5.5)
SODIUM BLD-SCNC: 140 MMOL/L (ref 132–146)
WBC # BLD: 7 E9/L (ref 4.5–11.5)

## 2020-05-11 PROCEDURE — 90715 TDAP VACCINE 7 YRS/> IM: CPT | Performed by: PREVENTIVE MEDICINE

## 2020-05-11 PROCEDURE — 2580000003 HC RX 258: Performed by: INTERNAL MEDICINE

## 2020-05-11 PROCEDURE — 2580000003 HC RX 258: Performed by: PREVENTIVE MEDICINE

## 2020-05-11 PROCEDURE — 36415 COLL VENOUS BLD VENIPUNCTURE: CPT

## 2020-05-11 PROCEDURE — 85025 COMPLETE CBC W/AUTO DIFF WBC: CPT

## 2020-05-11 PROCEDURE — 6360000002 HC RX W HCPCS: Performed by: INTERNAL MEDICINE

## 2020-05-11 PROCEDURE — 2580000003 HC RX 258

## 2020-05-11 PROCEDURE — 86900 BLOOD TYPING SEROLOGIC ABO: CPT

## 2020-05-11 PROCEDURE — 70450 CT HEAD/BRAIN W/O DYE: CPT

## 2020-05-11 PROCEDURE — 51702 INSERT TEMP BLADDER CATH: CPT

## 2020-05-11 PROCEDURE — 70486 CT MAXILLOFACIAL W/O DYE: CPT

## 2020-05-11 PROCEDURE — 73060 X-RAY EXAM OF HUMERUS: CPT

## 2020-05-11 PROCEDURE — 73502 X-RAY EXAM HIP UNI 2-3 VIEWS: CPT

## 2020-05-11 PROCEDURE — 0PSDXZZ REPOSITION LEFT HUMERAL HEAD, EXTERNAL APPROACH: ICD-10-PCS | Performed by: EMERGENCY MEDICINE

## 2020-05-11 PROCEDURE — 73560 X-RAY EXAM OF KNEE 1 OR 2: CPT

## 2020-05-11 PROCEDURE — 86850 RBC ANTIBODY SCREEN: CPT

## 2020-05-11 PROCEDURE — 71045 X-RAY EXAM CHEST 1 VIEW: CPT

## 2020-05-11 PROCEDURE — 86901 BLOOD TYPING SEROLOGIC RH(D): CPT

## 2020-05-11 PROCEDURE — 6370000000 HC RX 637 (ALT 250 FOR IP): Performed by: INTERNAL MEDICINE

## 2020-05-11 PROCEDURE — 1200000000 HC SEMI PRIVATE

## 2020-05-11 PROCEDURE — 73030 X-RAY EXAM OF SHOULDER: CPT

## 2020-05-11 PROCEDURE — 80048 BASIC METABOLIC PNL TOTAL CA: CPT

## 2020-05-11 PROCEDURE — 90471 IMMUNIZATION ADMIN: CPT | Performed by: PREVENTIVE MEDICINE

## 2020-05-11 PROCEDURE — 96374 THER/PROPH/DIAG INJ IV PUSH: CPT

## 2020-05-11 PROCEDURE — 72125 CT NECK SPINE W/O DYE: CPT

## 2020-05-11 PROCEDURE — 86920 COMPATIBILITY TEST SPIN: CPT

## 2020-05-11 PROCEDURE — 6360000002 HC RX W HCPCS: Performed by: PREVENTIVE MEDICINE

## 2020-05-11 PROCEDURE — 6360000002 HC RX W HCPCS: Performed by: STUDENT IN AN ORGANIZED HEALTH CARE EDUCATION/TRAINING PROGRAM

## 2020-05-11 PROCEDURE — 99285 EMERGENCY DEPT VISIT HI MDM: CPT

## 2020-05-11 RX ORDER — DEXTROSE AND SODIUM CHLORIDE 5; .9 G/100ML; G/100ML
INJECTION, SOLUTION INTRAVENOUS CONTINUOUS
Status: DISCONTINUED | OUTPATIENT
Start: 2020-05-11 | End: 2020-05-12

## 2020-05-11 RX ORDER — FENTANYL CITRATE 50 UG/ML
INJECTION, SOLUTION INTRAMUSCULAR; INTRAVENOUS
Status: DISPENSED
Start: 2020-05-11 | End: 2020-05-12

## 2020-05-11 RX ORDER — HYDROCODONE BITARTRATE AND ACETAMINOPHEN 5; 325 MG/1; MG/1
1 TABLET ORAL EVERY 4 HOURS PRN
Status: DISCONTINUED | OUTPATIENT
Start: 2020-05-11 | End: 2020-05-14 | Stop reason: HOSPADM

## 2020-05-11 RX ORDER — HYDROCHLOROTHIAZIDE 12.5 MG/1
12.5 TABLET ORAL DAILY
Status: DISCONTINUED | OUTPATIENT
Start: 2020-05-11 | End: 2020-05-13

## 2020-05-11 RX ORDER — LABETALOL 200 MG/1
100 TABLET, FILM COATED ORAL 2 TIMES DAILY
Status: DISCONTINUED | OUTPATIENT
Start: 2020-05-11 | End: 2020-05-14 | Stop reason: HOSPADM

## 2020-05-11 RX ORDER — SODIUM CHLORIDE 0.9 % (FLUSH) 0.9 %
SYRINGE (ML) INJECTION
Status: COMPLETED
Start: 2020-05-11 | End: 2020-05-11

## 2020-05-11 RX ORDER — HYDRALAZINE HYDROCHLORIDE 25 MG/1
25 TABLET, FILM COATED ORAL EVERY 8 HOURS SCHEDULED
Status: DISCONTINUED | OUTPATIENT
Start: 2020-05-11 | End: 2020-05-14 | Stop reason: HOSPADM

## 2020-05-11 RX ORDER — PRAMIPEXOLE DIHYDROCHLORIDE 0.12 MG/1
0.12 TABLET ORAL 3 TIMES DAILY
Status: DISCONTINUED | OUTPATIENT
Start: 2020-05-11 | End: 2020-05-14 | Stop reason: HOSPADM

## 2020-05-11 RX ORDER — FENTANYL CITRATE 50 UG/ML
25 INJECTION, SOLUTION INTRAMUSCULAR; INTRAVENOUS ONCE
Status: COMPLETED | OUTPATIENT
Start: 2020-05-11 | End: 2020-05-11

## 2020-05-11 RX ORDER — DEXTROSE MONOHYDRATE 25 G/50ML
25 INJECTION, SOLUTION INTRAVENOUS ONCE
Status: COMPLETED | OUTPATIENT
Start: 2020-05-11 | End: 2020-05-11

## 2020-05-11 RX ORDER — HEPARIN SODIUM 5000 [USP'U]/ML
5000 INJECTION, SOLUTION INTRAVENOUS; SUBCUTANEOUS 2 TIMES DAILY
Status: DISCONTINUED | OUTPATIENT
Start: 2020-05-11 | End: 2020-05-12

## 2020-05-11 RX ADMIN — HYDROCODONE BITARTRATE AND ACETAMINOPHEN 1 TABLET: 5; 325 TABLET ORAL at 20:45

## 2020-05-11 RX ADMIN — HEPARIN SODIUM 5000 UNITS: 5000 INJECTION, SOLUTION INTRAVENOUS; SUBCUTANEOUS at 20:45

## 2020-05-11 RX ADMIN — FENTANYL CITRATE 25 MCG: 50 INJECTION, SOLUTION INTRAMUSCULAR; INTRAVENOUS at 16:09

## 2020-05-11 RX ADMIN — DEXTROSE MONOHYDRATE 25 G: 25 INJECTION, SOLUTION INTRAVENOUS at 15:57

## 2020-05-11 RX ADMIN — SODIUM CHLORIDE, PRESERVATIVE FREE 10 ML: 5 INJECTION INTRAVENOUS at 19:59

## 2020-05-11 RX ADMIN — FENTANYL CITRATE 25 MCG: 50 INJECTION, SOLUTION INTRAMUSCULAR; INTRAVENOUS at 17:41

## 2020-05-11 RX ADMIN — PRAMIPEXOLE DIHYDROCHLORIDE 0.12 MG: 0.12 TABLET ORAL at 20:45

## 2020-05-11 RX ADMIN — LABETALOL HYDROCHLORIDE 100 MG: 200 TABLET, FILM COATED ORAL at 20:45

## 2020-05-11 RX ADMIN — TETANUS TOXOID, REDUCED DIPHTHERIA TOXOID AND ACELLULAR PERTUSSIS VACCINE, ADSORBED 0.5 ML: 5; 2.5; 8; 8; 2.5 SUSPENSION INTRAMUSCULAR at 13:38

## 2020-05-11 RX ADMIN — DEXTROSE AND SODIUM CHLORIDE: 5; 900 INJECTION, SOLUTION INTRAVENOUS at 19:59

## 2020-05-11 RX ADMIN — HYDROCHLOROTHIAZIDE 12.5 MG: 12.5 TABLET ORAL at 20:45

## 2020-05-11 ASSESSMENT — ENCOUNTER SYMPTOMS
NAUSEA: 0
COUGH: 0
CHEST TIGHTNESS: 0
VOMITING: 0
DIARRHEA: 0
ABDOMINAL PAIN: 0
FACIAL SWELLING: 1
CONSTIPATION: 0
ALLERGIC/IMMUNOLOGIC NEGATIVE: 1
SHORTNESS OF BREATH: 0

## 2020-05-11 ASSESSMENT — PAIN DESCRIPTION - PAIN TYPE: TYPE: ACUTE PAIN

## 2020-05-11 ASSESSMENT — PAIN DESCRIPTION - LOCATION: LOCATION: ARM

## 2020-05-11 ASSESSMENT — PAIN DESCRIPTION - PROGRESSION: CLINICAL_PROGRESSION: GRADUALLY IMPROVING

## 2020-05-11 ASSESSMENT — PAIN DESCRIPTION - ONSET: ONSET: ON-GOING

## 2020-05-11 ASSESSMENT — PAIN SCALES - GENERAL
PAINLEVEL_OUTOF10: 10
PAINLEVEL_OUTOF10: 3
PAINLEVEL_OUTOF10: 3
PAINLEVEL_OUTOF10: 5

## 2020-05-11 ASSESSMENT — PAIN DESCRIPTION - FREQUENCY: FREQUENCY: INTERMITTENT

## 2020-05-11 ASSESSMENT — PAIN DESCRIPTION - ORIENTATION: ORIENTATION: LEFT

## 2020-05-11 ASSESSMENT — PAIN DESCRIPTION - DESCRIPTORS: DESCRIPTORS: ACHING;DISCOMFORT;SORE

## 2020-05-11 NOTE — LETTER
Beneficiary Notification Letter  BPCI Advanced     Your Doctor or Luanne,    We wanted to let you know that your health care provider, Sixto Samuel, has volunteered to take part in our [facility] for [facility] & Medicaid Services (CMS) Bundled Payments for [facility] (BPCI Advanced). This doesnt change your Medicare rights or benefits and you dont need to do anything. What are bundled payments? A bundled payment combines, or bundles together, payments that Medicare makes to your health care providers for the many different kinds of medical services you might get in a specific time period. In BPCI Advanced, this time period could include a hospital  inpatient stay or outpatient procedure, plus 90 days. Why would Medicare bundle payments? Bundled payments are thought of as a value-based way to pay because health care  providers are responsible for both the quality and cost of medical care they give. This is  a relatively new way of paying health care providers compared to thefee-for-service  way Medicare has traditionally paid, where providers are paid separately for each  service they provide. Bundled payments encourage these providers to work together to  provide better, more coordinated care during your hospital stay, or outpatient procedure,  and through your recovery. What does BPCI Advance mean for me? Youre more likely to get even better care when hospitals, doctors, and other health care  providers work together. In BPCI Advanced, hospitals, doctors, and other health care  providers may be rewarded for providing better, more coordinated health care. Medicare  will watch BPCI Advanced participants closely to make sure that you and other patients  keep getting efficient, high quality care. What do I need to know about BPCI Advanced?

## 2020-05-11 NOTE — ED PROVIDER NOTES
23 mg/dL    CREATININE 1.9 (H) 0.5 - 1.0 mg/dL    GFR Non-African American 25 >=60 mL/min/1.73    GFR African American 30     Calcium 9.4 8.6 - 10.2 mg/dL   TYPE AND SCREEN   Result Value Ref Range    ABO/Rh A POS     Antibody Screen NEG        RADIOLOGY:  XR SHOULDER LEFT (MIN 2 VIEWS)   Final Result   Multiple displaced and nondisplaced fractures of the left humerus, as   described in detail above. XR HIP LEFT (2-3 VIEWS)   Final Result   No acute fracture or dislocation left hip. XR CHEST 1 VW   Final Result   No acute cardiopulmonary disease or definite acute fracture. CT Head WO Contrast   Final Result   No acute intracranial abnormality. Mild nonacute appearing sinus disease. CT Facial Bones WO Contrast   Final Result   No acute maxillofacial fracture identified. CT Cervical Spine WO Contrast   Final Result   No acute abnormality of the cervical spine. Multilevel degenerative changes, as above. 1.5 cm well-marginated hypoechoic right thyroid nodule. RECOMMENDATIONS:   Nonemergent outpatient ultrasound follow-up thyroid               ------------------------- NURSING NOTES AND VITALS REVIEWED ---------------------------  Date / Time Roomed:  5/11/2020  1:00 PM  ED Bed Assignment:  08/08    The nursing notes within the ED encounter and vital signs as below have been reviewed. Patient Vitals for the past 24 hrs:   BP Temp Temp src Pulse Resp SpO2 Height Weight   05/11/20 1600 (!) 155/46 -- -- 63 18 96 % -- --   05/11/20 1310 (!) 126/52 97.1 °F (36.2 °C) Oral 53 18 97 % 5' (1.524 m) 126 lb (57.2 kg)       Oxygen Saturation Interpretation: Normal    ------------------------------------------ PROGRESS NOTES ------------------------------------------    Counseling:  I have spoken with the patient and discussed todays results, in addition to providing specific details for the plan of care and counseling regarding the diagnosis and prognosis.   Their

## 2020-05-11 NOTE — CONSULTS
Department of Orthopedic Surgery  Resident Consult Note          Reason for Consult:  Left Arm Pain    HISTORY OF PRESENT ILLNESS:       Patient is a 81 y.o. female who presents with left arm pain after a fall from her walker earlier today. Patient states she had immediate pain and deformity to her left upper extremity. She also hit her head and has abrasions and swelling to the left eye. Patient denies any new numbness, tingling or paresthesias to the left upper extremity distal to the elbow. Patient denies any blood thinners. Patient is a community Ambulator with assistance from a wheeled walker. She has extensive spine surgical history. Patient denies previous orthopedic surgeon but states she would prefer Dr. Samra Orona.   Endorses some left knee pain on exam.    Past Medical History:        Diagnosis Date    Anxiety     Arthritis     Back pain     Cancer ([address]     skin    Depression     Hyperlipidemia     Hypertension     Osteoporosis      Past Surgical History:        Procedure Laterality Date    COLONOSCOPY      EYE SURGERY      carmelo lense implants    HYSTERECTOMY      NERVE BLOCK Left 03/28/2017    leftlumbar transforaminal NB #1     NERVE BLOCK Left 04/04/2017    Left lumbar transforaminal nerve block #2 L4-5, L5-S1    NERVE BLOCK Left 06/13/2017    transforaminal nerve block, lumbar #3    NERVE BLOCK Left 08/15/2017    paravertebral facet left lumbar #1    OTHER SURGICAL HISTORY  02/10/2017    CT guided lumbar spine bone biopsy    PARATHYROID GLAND SURGERY      WI LUMBAR SPINE FUSN,POST INTRBDY N/A 9/13/2018    L3-S1 FUSION, L4-L5, L5-S1  TRANSFORAMINAL LUMBAR INTERBODY FUSION -- NEEDS CAGES, PLATES, SCREWS, O-ARM, AUDIOLOGY, CHIARA TABLE, CELL SAVER, PLATELET GEL - [employer] performed by Giovanni Jimenez MD at [facility] OR     Current Medications:   Current Facility-Administered Medications: sodium chloride flush 0.9 % injection, , ,   Allergies:  Patient has no known

## 2020-05-12 PROBLEM — N17.9 ACUTE RENAL FAILURE SUPERIMPOSED ON STAGE 3 CHRONIC KIDNEY DISEASE (HCC): Status: ACTIVE | Noted: 2020-05-12

## 2020-05-12 PROBLEM — N18.30 ACUTE RENAL FAILURE SUPERIMPOSED ON STAGE 3 CHRONIC KIDNEY DISEASE (HCC): Status: ACTIVE | Noted: 2020-05-12

## 2020-05-12 LAB
ALBUMIN SERPL-MCNC: 3.2 G/DL (ref 3.5–5.2)
ALP BLD-CCNC: 211 U/L (ref 35–104)
ALT SERPL-CCNC: 42 U/L (ref 0–32)
ANION GAP SERPL CALCULATED.3IONS-SCNC: 12 MMOL/L (ref 7–16)
ANION GAP SERPL CALCULATED.3IONS-SCNC: 12 MMOL/L (ref 7–16)
AST SERPL-CCNC: 47 U/L (ref 0–31)
BILIRUB SERPL-MCNC: 0.3 MG/DL (ref 0–1.2)
BUN BLDV-MCNC: 54 MG/DL (ref 8–23)
BUN BLDV-MCNC: 54 MG/DL (ref 8–23)
CALCIUM SERPL-MCNC: 8.8 MG/DL (ref 8.6–10.2)
CALCIUM SERPL-MCNC: 8.9 MG/DL (ref 8.6–10.2)
CHLORIDE BLD-SCNC: 105 MMOL/L (ref 98–107)
CHLORIDE BLD-SCNC: 106 MMOL/L (ref 98–107)
CO2: 23 MMOL/L (ref 22–29)
CO2: 23 MMOL/L (ref 22–29)
CREAT SERPL-MCNC: 2.2 MG/DL (ref 0.5–1)
CREAT SERPL-MCNC: 2.6 MG/DL (ref 0.5–1)
GFR AFRICAN AMERICAN: 21
GFR AFRICAN AMERICAN: 26
GFR NON-AFRICAN AMERICAN: 18 ML/MIN/1.73
GFR NON-AFRICAN AMERICAN: 21 ML/MIN/1.73
GLUCOSE BLD-MCNC: 104 MG/DL (ref 74–99)
GLUCOSE BLD-MCNC: 132 MG/DL (ref 74–99)
HCT VFR BLD CALC: 25.4 % (ref 34–48)
HEMOGLOBIN: 8.3 G/DL (ref 11.5–15.5)
MCH RBC QN AUTO: 31.2 PG (ref 26–35)
MCHC RBC AUTO-ENTMCNC: 32.7 % (ref 32–34.5)
MCV RBC AUTO: 95.5 FL (ref 80–99.9)
PDW BLD-RTO: 16 FL (ref 11.5–15)
PLATELET # BLD: 163 E9/L (ref 130–450)
PMV BLD AUTO: 9.5 FL (ref 7–12)
POTASSIUM SERPL-SCNC: 5 MMOL/L (ref 3.5–5)
POTASSIUM SERPL-SCNC: 5.7 MMOL/L (ref 3.5–5)
RBC # BLD: 2.66 E12/L (ref 3.5–5.5)
SODIUM BLD-SCNC: 140 MMOL/L (ref 132–146)
SODIUM BLD-SCNC: 141 MMOL/L (ref 132–146)
TOTAL PROTEIN: 5.9 G/DL (ref 6.4–8.3)
WBC # BLD: 5.4 E9/L (ref 4.5–11.5)

## 2020-05-12 PROCEDURE — 36415 COLL VENOUS BLD VENIPUNCTURE: CPT

## 2020-05-12 PROCEDURE — 6370000000 HC RX 637 (ALT 250 FOR IP): Performed by: INTERNAL MEDICINE

## 2020-05-12 PROCEDURE — 2580000003 HC RX 258: Performed by: INTERNAL MEDICINE

## 2020-05-12 PROCEDURE — 80053 COMPREHEN METABOLIC PANEL: CPT

## 2020-05-12 PROCEDURE — 85027 COMPLETE CBC AUTOMATED: CPT

## 2020-05-12 PROCEDURE — 97530 THERAPEUTIC ACTIVITIES: CPT

## 2020-05-12 PROCEDURE — 97530 THERAPEUTIC ACTIVITIES: CPT | Performed by: PHYSICAL THERAPIST

## 2020-05-12 PROCEDURE — 6360000002 HC RX W HCPCS: Performed by: INTERNAL MEDICINE

## 2020-05-12 PROCEDURE — 97110 THERAPEUTIC EXERCISES: CPT | Performed by: PHYSICAL THERAPIST

## 2020-05-12 PROCEDURE — 80048 BASIC METABOLIC PNL TOTAL CA: CPT

## 2020-05-12 PROCEDURE — 1200000000 HC SEMI PRIVATE

## 2020-05-12 PROCEDURE — 97165 OT EVAL LOW COMPLEX 30 MIN: CPT

## 2020-05-12 PROCEDURE — 97161 PT EVAL LOW COMPLEX 20 MIN: CPT | Performed by: PHYSICAL THERAPIST

## 2020-05-12 PROCEDURE — 97535 SELF CARE MNGMENT TRAINING: CPT

## 2020-05-12 RX ORDER — ONDANSETRON 2 MG/ML
4 INJECTION INTRAMUSCULAR; INTRAVENOUS EVERY 6 HOURS PRN
Status: DISCONTINUED | OUTPATIENT
Start: 2020-05-12 | End: 2020-05-14 | Stop reason: HOSPADM

## 2020-05-12 RX ORDER — SODIUM CHLORIDE 9 MG/ML
INJECTION, SOLUTION INTRAVENOUS CONTINUOUS
Status: DISCONTINUED | OUTPATIENT
Start: 2020-05-12 | End: 2020-05-14 | Stop reason: HOSPADM

## 2020-05-12 RX ORDER — SODIUM POLYSTYRENE SULFONATE 15 G/60ML
15 SUSPENSION ORAL; RECTAL ONCE
Status: COMPLETED | OUTPATIENT
Start: 2020-05-12 | End: 2020-05-12

## 2020-05-12 RX ADMIN — ONDANSETRON 4 MG: 2 INJECTION INTRAMUSCULAR; INTRAVENOUS at 19:48

## 2020-05-12 RX ADMIN — LABETALOL HYDROCHLORIDE 100 MG: 200 TABLET, FILM COATED ORAL at 19:48

## 2020-05-12 RX ADMIN — PRAMIPEXOLE DIHYDROCHLORIDE 0.12 MG: 0.12 TABLET ORAL at 08:42

## 2020-05-12 RX ADMIN — HYDRALAZINE HYDROCHLORIDE 25 MG: 25 TABLET, FILM COATED ORAL at 22:27

## 2020-05-12 RX ADMIN — HYDROCODONE BITARTRATE AND ACETAMINOPHEN 1 TABLET: 5; 325 TABLET ORAL at 05:17

## 2020-05-12 RX ADMIN — PRAMIPEXOLE DIHYDROCHLORIDE 0.12 MG: 0.12 TABLET ORAL at 19:48

## 2020-05-12 RX ADMIN — HYDROCHLOROTHIAZIDE 12.5 MG: 12.5 TABLET ORAL at 08:40

## 2020-05-12 RX ADMIN — SODIUM POLYSTYRENE SULFONATE 15 G: 15 SUSPENSION ORAL; RECTAL at 08:40

## 2020-05-12 RX ADMIN — HYDROCODONE BITARTRATE AND ACETAMINOPHEN 1 TABLET: 5; 325 TABLET ORAL at 01:07

## 2020-05-12 RX ADMIN — LABETALOL HYDROCHLORIDE 100 MG: 200 TABLET, FILM COATED ORAL at 08:40

## 2020-05-12 RX ADMIN — HYDROCODONE BITARTRATE AND ACETAMINOPHEN 1 TABLET: 5; 325 TABLET ORAL at 19:48

## 2020-05-12 RX ADMIN — SODIUM CHLORIDE: 9 INJECTION, SOLUTION INTRAVENOUS at 08:40

## 2020-05-12 RX ADMIN — PRAMIPEXOLE DIHYDROCHLORIDE 0.12 MG: 0.12 TABLET ORAL at 14:34

## 2020-05-12 ASSESSMENT — PAIN SCALES - PAIN ASSESSMENT IN ADVANCED DEMENTIA (PAINAD)
CONSOLABILITY: 0
NEGVOCALIZATION: 1
BODYLANGUAGE: 0
BREATHING: 0
FACIALEXPRESSION: 0
TOTALSCORE: 1

## 2020-05-12 ASSESSMENT — PAIN SCALES - GENERAL
PAINLEVEL_OUTOF10: 0
PAINLEVEL_OUTOF10: 0
PAINLEVEL_OUTOF10: 9
PAINLEVEL_OUTOF10: 0
PAINLEVEL_OUTOF10: 10
PAINLEVEL_OUTOF10: 5
PAINLEVEL_OUTOF10: 3

## 2020-05-12 ASSESSMENT — PAIN DESCRIPTION - LOCATION: LOCATION: ARM;FACE

## 2020-05-12 ASSESSMENT — PAIN DESCRIPTION - ORIENTATION: ORIENTATION: LEFT

## 2020-05-12 ASSESSMENT — PAIN DESCRIPTION - PAIN TYPE: TYPE: ACUTE PAIN

## 2020-05-12 ASSESSMENT — PAIN DESCRIPTION - DESCRIPTORS: DESCRIPTORS: ACHING;TENDER;DISCOMFORT

## 2020-05-12 NOTE — PROGRESS NOTES
OCCUPATIONAL THERAPY  Initial Evaluation  Date:2020  Patient Name: Nazia Jones  MRN: [id number]  : 1935  ROOM #: 0325/0325-02     Referring Provider: Kosta Mishra MD  Evaluating OT: Yuliana Sesay OTR/L [id number]    Placement Recommendation: Subacute    Recommended Adaptive Equipment: TBD at rehab    [facility]   AM-[facility] Daily Activity Inpatient   How much help for putting on and taking off regular lower body clothing?: Total  How much help for Bathing?: A Lot  How much help for Toileting?: Total  How much help for putting on and taking off regular upper body clothing?: A Lot  How much help for taking care of personal grooming?: A Lot  How much help for eating meals?: A Lot  [facility] Inpatient Daily Activity Raw Score: 10  AM-[facility] Inpatient ADL T-Scale Score : 27.31  ADL Inpatient CMS 0-100% Score: 74.7  ADL Inpatient CMS G-Code Modifier : CL    Diagnosis:   1. Closed displaced simple supracondylar fracture of left humerus without intercondylar fracture, initial encounter    2. Fall, initial encounter      Pertinent Medical History:   Past Medical History:   Diagnosis Date    Anxiety     Arthritis     Back pain     Depression     Hyperlipidemia     Hypertension     Osteoporosis         Precautions:  Falls, L proximal humerus and humeral shaft fractures, L UE splinting to L UE, abrasions around L eye, confused    Pain Scale: Numeric Rate: 8/10 pain in L UE; Nursing notified. Social history: with family: spouse     Home architecture: single family home, 1 story, 3+1 step to enter with B rails, tub shower. PLOF: independent with BADL and IADL, ambulated with wheeled walker   Equipment owned: wheeled walker, cane, wheelchair, shower chair  Cognition: oriented x 1; follows 1 step directions.     Poor Problem solving skills   poor Memory    poor Sequencing  Communication: intact   Visual perceptual skills: intact      Glasses: not at time of evaluation   Edema: yes, L UE    Sensation: intact   Hand Dominance: Left     X  Right     Left Right Comment   Passive range of motion N/T  [facility] [place]     Active range of motion N/T  [facility] [place]     Muscle Grade N/T  3/5    /pinch Strength Diminished  Intact       Functional Assessment:   Initial Eval Status  Date: 5/12/2020 Treatment Status  Date: STGs = LTGs  Time frame: 5-7 days   Feeding Maximal Assist to bring cup to mouth  Independent    Grooming Maximal Assist   Minimal Assist    UB Dressing Maximal Assist   Minimal Assist    LB Dressing Dependent   Moderate Assist    Bathing Maximal Assist  Minimal Assist    Toileting Dependent   Moderate Assist    Bed Mobility  Supine to sit: Dependent x 2   Scooting:Dependent x 2  Sit to supine: Dependent x 2  Supine to sit: Minimal Assist   Sit to supine: Minimal Assist    Functional Transfers Maximal Assist x 2 from EOB with use of chux as sling and hand held assist  Supervision    Functional Mobility Maximal assist x 2 with hand held assist, 4 side steps towards head of bed, assistance to advance R LE and for weight shifting  Minimal Assist    Activity Tolerance poor  Fair plus      Balance:   Sitting: poor   Standing: poor     Endurance: poor     Comments: Upon arrival to the room the patient was supine. At end of the session, the patient was returned to supine with HOB elevated. Call light and phone within reach. All lines and tubes intact. Overall patient demonstrated decreased independence and safety during completion of ADL/functional transfer/mobility tasks. Pt would benefit from continued skilled OT to increase safety and independence with completion of ADL/IADL tasks for functional independence and quality of life. Treatment: Therapist facilitated bed mobility. Sitting balance at EOB to increase dynamic sitting balance and activity tolerance. Assisted nursing to add padding/dressing around splinting to improve skin integrity, assistance to doff and don hospital gown around splint.  Transfer training with verbal cues to for hand

## 2020-05-12 NOTE — CARE COORDINATION
SS Note/Discharge plan:  Contacted pt's ,Gavin by phone and spoke with his grandson, Zamzam Robb also who called in to check on pt, explained sw role for transition of care and consult, reviewed PT/OT evals and recommendations however Mr Alaina Choi does NOT want to consider a rehab placement d/t COVID 19 concerns, he prefers to bring his wife home with Alvina Gutierrez, says she has had agency services in the past and declined need to review [address] list, pt has a quad cane and w/w for home use and says his son, Odilon Francis live next door and will assist as needed, Zamzam Robb says he lives only 3 miles away, says he visits often and will be available to assist also, he states that his grandmother has a \" low tolerance\" for pain meds and feels this is the cause of her increased confusion and grogginess, family hoping she will improve in therapy and be able to return home with [address] and assistance from family, sw made a referral to [facility] for Alvina Gutierrez who will follow for [address] orders and medical d/c, sw/cm to follow to confirm d/c plan, nursing informed. Electronically signed by RODRIGUEZ Blair on 5/12/2020 at 1:35 PM

## 2020-05-12 NOTE — PROGRESS NOTES
Physical Therapy        Patient unavailable for physical therapy treatment due to sleeping soundly . Unable to awaken with max verbal/tactile cuing. Will check back next scheduled rx session. [place] PTA LIC # [id number]

## 2020-05-12 NOTE — PLAN OF CARE
Problem: Pain:  Goal: Pain level will decrease  Description: Pain level will decrease  5/12/2020 0934 by Yu Bryant RN  Outcome: Met This Shift     Problem: Pain:  Goal: Control of acute pain  Description: Control of acute pain  5/12/2020 0934 by Yu Bryant RN  Outcome: Met This Shift     Problem: Pain:  Goal: Control of chronic pain  Description: Control of chronic pain  5/12/2020 0934 by Yu Bryant RN  Outcome: Met This Shift     Problem: Falls - Risk of:  Goal: Will remain free from falls  Description: Will remain free from falls  5/12/2020 0934 by Yu Bryant RN  Outcome: Met This Shift     Problem: Falls - Risk of:  Goal: Absence of physical injury  Description: Absence of physical injury  5/12/2020 0934 by Yu Bryant RN  Outcome: Met This Shift

## 2020-05-12 NOTE — PROGRESS NOTES
Department of Orthopedic Surgery  Resident Progress Note    Patient seen and examined while laying soundly in bed. Patient still continues to be confused. She denies any other pains other than the left arm. Denies shortness of breath, denies chest pain. VITALS:  BP (!) 106/47   Pulse 83   Temp 97.5 °F (36.4 °C) (Oral)   Resp 18   Ht 5' (1.524 m)   Wt 126 lb (57.2 kg)   SpO2 97%   BMI 24.61 kg/m²     General: lethargic, awakens to verbal stimuli, alert and oriented x2    MUSCULOSKELETAL:   Left Upper Extremity:  · Splint intact, patient drifting to her right side, splint was realigned and ACE wrap adjusted. · Compartments soft and compressible  · +AIN/Ulnar nerve function intact grossly; 3+-4/5 in PIN nerve distribution  · +Radial pulse, Brisk Cap refill, hand warm and perfused  · Sensation intact to touch in radial/ulnar/median nerve distributions to hand    Secondary Exam:   · rightUE: -TTP to fingers, hand, wrist, forearm, elbow, humerus, shoulder or clavicle. --Patient able to flex/extend fingers, wrist, elbow and shoulder with active and passive ROM without pain. · bilateralLE: -TTP to foot, ankle, leg, knee, thigh, hip.--Patient able to flex/extend toes, ankle, knee and hip with active and passive ROM without pain. · Pelvis: -TTP, -Log roll, -Heel strike     CBC:   Lab Results   Component Value Date    WBC 5.4 05/12/2020    HGB 8.3 05/12/2020    HCT 25.4 05/12/2020     05/12/2020     PT/INR:    Lab Results   Component Value Date    PROTIME 11.8 12/28/2018    INR 1.0 12/28/2018       ASSESSMENT  · Left humeral shaft fracture, closed - 5/11/20  · Left proximal humerus fracture, closed - 5/11/20    PLAN      · Continue physical therapy and protocol: [facility]  · Deep venous thrombosis prophylaxis - Per primary, early mobilization  · PT/OT  · Pain Control: per primary  · Monitor labs  · D/C Plan: Will need PT/OT recommendations, CM/SW recs.   Okay to follow up with us in office when

## 2020-05-12 NOTE — PROGRESS NOTES
Physical Therapy Initial Evaluation    Room #:  0325/0325-02  Patient Name: Tanisha North  YOB: 1935  MRN: [id number]    Referring Provider: Aren Hernandez MD    Date of Service: 5/12/2020    Evaluating Physical Therapist:  Abel Jung, PT #[id number]      Diagnosis:   Closed displaced simple supracondylar fracture of left humerus without intercondylar fracture, initial encounter [S42.707A]        Patient Active Problem List   Diagnosis    Chronic lower back pain    Bulging lumbar disc    Essential hypertension    Hyperlipidemia    CKD (chronic kidney disease)    RLS (restless legs syndrome)    Lumbar foraminal stenosis    Lumbar facet arthropathy     Lumbosacral spondylosis    Chronic pain    Lumbar disc herniation    Acute blood loss anemia    [facility]ABS [facility]) [address] [address]    Altered mental status    Acute hyperactive delirium due to multiple etiologies    Non-traumatic compression fracture of eleventh thoracic vertebra ([facility] [address]    Adult idiopathic generalized osteoporosis    Closed displaced simple supracondylar fracture of left humerus without intercondylar fracture        Tentative placement recommendation: Subacute rehab    Equipment recommendation: To be determined      Prior Level of Function: Patient ambulated with wheeled walker ?   Rehab Potential: fair  for baseline    Past medical history:   Past Medical History:   Diagnosis Date    Anxiety     Arthritis     Back pain     Depression     Hyperlipidemia     Hypertension     Osteoporosis      Past Surgical History:   Procedure Laterality Date    COLONOSCOPY      ENDOSCOPY, COLON, DIAGNOSTIC      EYE SURGERY      carmelo lense implants    HYSTERECTOMY      JOINT REPLACEMENT      NERVE BLOCK Left 03/28/2017    leftlumbar transforaminal NB #1     NERVE BLOCK Left 04/04/2017    Left lumbar transforaminal nerve block #2 L4-5, L5-S1    NERVE BLOCK Left 06/13/2017    transforaminal nerve block, lumbar #3    NERVE BLOCK

## 2020-05-13 LAB
ALBUMIN SERPL-MCNC: 3.3 G/DL (ref 3.5–5.2)
ALP BLD-CCNC: 185 U/L (ref 35–104)
ALT SERPL-CCNC: 36 U/L (ref 0–32)
ANION GAP SERPL CALCULATED.3IONS-SCNC: 11 MMOL/L (ref 7–16)
AST SERPL-CCNC: 51 U/L (ref 0–31)
BILIRUB SERPL-MCNC: 0.3 MG/DL (ref 0–1.2)
BUN BLDV-MCNC: 48 MG/DL (ref 8–23)
CALCIUM SERPL-MCNC: 8.8 MG/DL (ref 8.6–10.2)
CHLORIDE BLD-SCNC: 107 MMOL/L (ref 98–107)
CO2: 24 MMOL/L (ref 22–29)
CREAT SERPL-MCNC: 2.4 MG/DL (ref 0.5–1)
GFR AFRICAN AMERICAN: 23
GFR NON-AFRICAN AMERICAN: 19 ML/MIN/1.73
GLUCOSE BLD-MCNC: 92 MG/DL (ref 74–99)
POTASSIUM SERPL-SCNC: 4.8 MMOL/L (ref 3.5–5)
SODIUM BLD-SCNC: 142 MMOL/L (ref 132–146)
TOTAL PROTEIN: 5.8 G/DL (ref 6.4–8.3)

## 2020-05-13 PROCEDURE — 6360000002 HC RX W HCPCS: Performed by: INTERNAL MEDICINE

## 2020-05-13 PROCEDURE — 6370000000 HC RX 637 (ALT 250 FOR IP): Performed by: INTERNAL MEDICINE

## 2020-05-13 PROCEDURE — 36415 COLL VENOUS BLD VENIPUNCTURE: CPT

## 2020-05-13 PROCEDURE — 80053 COMPREHEN METABOLIC PANEL: CPT

## 2020-05-13 PROCEDURE — 97110 THERAPEUTIC EXERCISES: CPT

## 2020-05-13 PROCEDURE — 1200000000 HC SEMI PRIVATE

## 2020-05-13 RX ORDER — HEPARIN SODIUM 5000 [USP'U]/ML
5000 INJECTION, SOLUTION INTRAVENOUS; SUBCUTANEOUS EVERY 8 HOURS SCHEDULED
Status: DISCONTINUED | OUTPATIENT
Start: 2020-05-13 | End: 2020-05-14 | Stop reason: HOSPADM

## 2020-05-13 RX ADMIN — ONDANSETRON 4 MG: 2 INJECTION INTRAMUSCULAR; INTRAVENOUS at 03:25

## 2020-05-13 RX ADMIN — HEPARIN SODIUM 5000 UNITS: 5000 INJECTION, SOLUTION INTRAVENOUS; SUBCUTANEOUS at 22:02

## 2020-05-13 RX ADMIN — HYDRALAZINE HYDROCHLORIDE 25 MG: 25 TABLET, FILM COATED ORAL at 05:27

## 2020-05-13 RX ADMIN — HYDROCODONE BITARTRATE AND ACETAMINOPHEN 1 TABLET: 5; 325 TABLET ORAL at 16:33

## 2020-05-13 RX ADMIN — PRAMIPEXOLE DIHYDROCHLORIDE 0.12 MG: 0.12 TABLET ORAL at 07:47

## 2020-05-13 RX ADMIN — HYDRALAZINE HYDROCHLORIDE 25 MG: 25 TABLET, FILM COATED ORAL at 14:56

## 2020-05-13 RX ADMIN — HYDRALAZINE HYDROCHLORIDE 25 MG: 25 TABLET, FILM COATED ORAL at 22:02

## 2020-05-13 RX ADMIN — HYDROCODONE BITARTRATE AND ACETAMINOPHEN 1 TABLET: 5; 325 TABLET ORAL at 03:25

## 2020-05-13 RX ADMIN — HYDROCHLOROTHIAZIDE 12.5 MG: 12.5 TABLET ORAL at 07:46

## 2020-05-13 RX ADMIN — PRAMIPEXOLE DIHYDROCHLORIDE 0.12 MG: 0.12 TABLET ORAL at 20:54

## 2020-05-13 RX ADMIN — PRAMIPEXOLE DIHYDROCHLORIDE 0.12 MG: 0.12 TABLET ORAL at 14:56

## 2020-05-13 RX ADMIN — HEPARIN SODIUM 5000 UNITS: 5000 INJECTION, SOLUTION INTRAVENOUS; SUBCUTANEOUS at 16:34

## 2020-05-13 RX ADMIN — LABETALOL HYDROCHLORIDE 100 MG: 200 TABLET, FILM COATED ORAL at 20:54

## 2020-05-13 RX ADMIN — LABETALOL HYDROCHLORIDE 100 MG: 200 TABLET, FILM COATED ORAL at 07:47

## 2020-05-13 ASSESSMENT — PAIN SCALES - GENERAL
PAINLEVEL_OUTOF10: 10
PAINLEVEL_OUTOF10: 0
PAINLEVEL_OUTOF10: 4

## 2020-05-13 ASSESSMENT — PAIN SCALES - PAIN ASSESSMENT IN ADVANCED DEMENTIA (PAINAD)
BREATHING: 0
NEGVOCALIZATION: 1
FACIALEXPRESSION: 0
FACIALEXPRESSION: 0
NEGVOCALIZATION: 1
TOTALSCORE: 1
BODYLANGUAGE: 0
BREATHING: 0
CONSOLABILITY: 0
BODYLANGUAGE: 1
CONSOLABILITY: 0
TOTALSCORE: 2

## 2020-05-13 NOTE — PLAN OF CARE
Problem: Pain:  Goal: Pain level will decrease  Description: Pain level will decrease  5/12/2020 2106 by Asmita Ramirez RN  Outcome: Met This Shift     Problem: Pain:  Goal: Control of acute pain  Description: Control of acute pain  5/12/2020 2106 by Asmita Ramirez RN  Outcome: Met This Shift     Problem: Falls - Risk of:  Goal: Will remain free from falls  Description: Will remain free from falls  5/12/2020 2106 by Asmita Ramirez RN  Outcome: Met This Shift     Problem: Falls - Risk of:  Goal: Absence of physical injury  Description: Absence of physical injury  5/12/2020 2106 by Asmita Ramirez RN  Outcome: Met This Shift

## 2020-05-13 NOTE — PROGRESS NOTES
Department of Orthopedic Surgery  Resident Progress Note    Patient seen and examined while laying soundly in bed. Patient still continues to be confused. She denies any other pains other than the left arm. Denies shortness of breath, denies chest pain. VITALS:  /88   Pulse 90   Temp 97.8 °F (36.6 °C) (Oral)   Resp 20   Ht 5' (1.524 m)   Wt 126 lb (57.2 kg)   SpO2 92%   BMI 24.61 kg/m²     General: lethargic, awakens to verbal stimuli, alert and oriented x2    MUSCULOSKELETAL:   Left Upper Extremity:  · Splint intact, patient drifting to her right side, splint was realigned and ACE wrap adjusted. · Compartments soft and compressible  · +AIN/Ulnar nerve function intact grossly; 3+-4/5 in PIN nerve distribution  · +Radial pulse, Brisk Cap refill, hand warm and perfused  · Sensation intact to touch in radial/ulnar/median nerve distributions to hand    Secondary Exam:   · rightUE: -TTP to fingers, hand, wrist, forearm, elbow, humerus, shoulder or clavicle. --Patient able to flex/extend fingers, wrist, elbow and shoulder with active and passive ROM without pain. · bilateralLE: -TTP to foot, ankle, leg, knee, thigh, hip.--Patient able to flex/extend toes, ankle, knee and hip with active and passive ROM without pain. · Pelvis: -TTP, -Log roll, -Heel strike     CBC:   Lab Results   Component Value Date    WBC 5.4 05/12/2020    HGB 8.3 05/12/2020    HCT 25.4 05/12/2020     05/12/2020     PT/INR:    Lab Results   Component Value Date    PROTIME 11.8 12/28/2018    INR 1.0 12/28/2018       ASSESSMENT  · Left humeral shaft fracture, closed - 5/11/20  · Left proximal humerus fracture, closed - 5/11/20    PLAN      · Continue physical therapy and protocol: [facility]  · Deep venous thrombosis prophylaxis - Per primary, early mobilization  · PT/OT  · Pain Control: per primary  · Monitor labs  · D/C Plan: Will need PT/OT recommendations, CM/SW recs.   Okay to follow up with Dr. Fab Lake in office when

## 2020-05-13 NOTE — PROGRESS NOTES
[facility] Quality Flow/Interdisciplinary Rounds Progress Note        Quality Flow Rounds held on May 13, 2020    Disciplines Attending:  Bedside Nurse, ,  and Nursing Unit Leadership    Norris De Guzman was admitted on 5/11/2020  1:00 PM    Anticipated Discharge Date:  Expected Discharge Date: 05/14/20    Disposition:    Angel Score:  Angel Scale Score: 17    Readmission Risk              Risk of Unplanned Readmission:        9           Discussed patient goal for the day, patient clinical progression, and barriers to discharge.   The following Goal(s) of the Day/Commitment(s) have been identified:  Continue to monitor      Claudean Singh  May 13, 2020

## 2020-05-13 NOTE — PROGRESS NOTES
Patient ID:  Avtar Herrera  [id number]  81 y.o.  1935    HPI:  Patient is sleeping easily awakened, fascial sutures intact, left upper extremity has dressing, leg with no edema or pain. Family still plans to take patient home. questions, answers, and tests reviewed. ROS:  Cardiovascular:   Denies any chest pain, irregular heartbeats, or palpitations. Respiratory:   Denies shortness of breath, coughing, sputum production, hemoptysis, or wheezing. Gastrointestinal:   Denies nausea, vomiting, diarrhea, or constipation. Denies any abdominal pain. Extremities:   left upper extremity has dressing. Neurology:    Denies any headache or focal neurological deficits. No weakness or paresthesia. Derm:    Multiple bruising, laceration left face. 1.  Genitourinary:    Denies any urgency, frequency, hematuria. Voiding without difficulty. Physical Exam:    Vitals:    05/13/20 0800   BP:    Pulse: 71   Resp:    Temp:    SpO2: 96%       HEENT:  PERRLA. EOMI. Sclera clear. Buccal mucosa moist.    Neck:  Supple. Trachea midline. No thyromegaly. No JVD. No bruits. Heart:  Rhythm regular, rate controlled. No murmurs. Lungs:  Symmetrical. Clear to auscultation bilaterally. No wheezes. No rhonchi. No rales. Abdomen: Soft. Non-tender. Non-distended. Bowel sounds positive. No organomegaly or masses. No pain on palpation    Extremities:  Peripheral pulses present. No peripheral edema. No ulcers. Neurologic:  Alert x 3. No focal deficit. Cranial nerves grossly intact. Skin:  No petechia. No hemorrhage. No wounds.     Labs:  CBC:   Lab Results   Component Value Date    WBC 5.4 05/12/2020    RBC 2.66 05/12/2020    HGB 8.3 05/12/2020    HCT 25.4 05/12/2020    MCV 95.5 05/12/2020    MCH 31.2 05/12/2020    MCHC 32.7 05/12/2020    RDW 16.0 05/12/2020     05/12/2020    MPV 9.5 05/12/2020     CMP:    Lab Results   Component Value Date     05/13/2020    K 4.8 05/13/2020    K 4.2 12/04/2018    CL 107 05/13/2020    CO2 24 05/13/2020    BUN 48 05/13/2020    CREATININE 2.4 05/13/2020    GFRAA 23 05/13/2020    LABGLOM 19 05/13/2020    GLUCOSE 92 05/13/2020    GLUCOSE 76 06/04/2012    PROT 5.8 05/13/2020    LABALBU 3.3 05/13/2020    LABALBU 4.0 06/04/2012    CALCIUM 8.8 05/13/2020    BILITOT 0.3 05/13/2020    ALKPHOS 185 05/13/2020    AST 51 05/13/2020    ALT 36 05/13/2020     PT/INR:    Lab Results   Component Value Date    PROTIME 11.8 12/28/2018    INR 1.0 12/28/2018         XR KNEE LEFT (1-2 VIEWS)   Final Result   Osteoarthrosis without fracture or malalignment. XR HUMERUS LEFT (MIN 2 VIEWS)   Final Result   1. Mildly improved alignment of the distal humerus fracture which   demonstrates persistent displacement of at least 1 shaft width. 2. Fractures extend into the proximal humerus as noted on previous exam.   3. Osteopenia. XR SHOULDER LEFT (MIN 2 VIEWS)   Final Result   Multiple displaced and nondisplaced fractures of the left humerus, as   described in detail above. XR HIP LEFT (2-3 VIEWS)   Final Result   No acute fracture or dislocation left hip. XR CHEST 1 VW   Final Result   No acute cardiopulmonary disease or definite acute fracture. CT Head WO Contrast   Final Result   No acute intracranial abnormality. Mild nonacute appearing sinus disease. CT Facial Bones WO Contrast   Final Result   No acute maxillofacial fracture identified. CT Cervical Spine WO Contrast   Final Result   No acute abnormality of the cervical spine. Multilevel degenerative changes, as above. 1.5 cm well-marginated hypoechoic right thyroid nodule.       RECOMMENDATIONS:   Nonemergent outpatient ultrasound follow-up thyroid             Other Data:      Intake/Output Summary (Last 24 hours) at 5/13/2020 1618  Last data filed at 5/13/2020 1350  Gross per 24 hour   Intake 915.17 ml   Output 750 ml   Net 165.17 ml         Scheduled Medications:   heparin (porcine)

## 2020-05-13 NOTE — PLAN OF CARE
Problem: Pain:  Goal: Pain level will decrease  Description: Pain level will decrease  5/13/2020 0918 by Harmony Campbell RN  Outcome: Met This Shift     Problem: Pain:  Goal: Control of acute pain  Description: Control of acute pain  5/13/2020 0918 by Harmony Campbell RN  Outcome: Met This Shift     Problem: Falls - Risk of:  Goal: Will remain free from falls  Description: Will remain free from falls  5/13/2020 0918 by Harmony Campbell RN  Outcome: Met This Shift     Problem: Falls - Risk of:  Goal: Absence of physical injury  Description: Absence of physical injury  5/13/2020 0918 by Harmony Campbell RN  Outcome: Met This Shift

## 2020-05-13 NOTE — H&P
Patient ID:  Mague Beverly  [id number]  81 y.o.  1935    Chief Complaint: Patient post fall at home closed displaced supracondylar fracture left humerus, laceration on face. History of present illness:  Patient is a 81 y.o. patient known history of hypertension, previous lumbar surgery, lumbar facet arthropathy, postoperative discitis, stage III chronic renal insufficiency admitted post fall with left humerus fracture elevated potassium elevated BUN/creatinine.   Patient Active Problem List   Diagnosis    Chronic lower back pain    Bulging lumbar disc    Essential hypertension    Hyperlipidemia    CKD (chronic kidney disease)    RLS (restless legs syndrome)    Lumbar foraminal stenosis    Lumbar facet arthropathy     Lumbosacral spondylosis    Chronic pain    Lumbar disc herniation    Acute blood loss anemia    [facility]) [facility] [address]    Altered mental status    Acute hyperactive delirium due to multiple etiologies    Non-traumatic compression fracture of eleventh thoracic vertebra ([facility] [address]    Adult idiopathic generalized osteoporosis    Closed displaced simple supracondylar fracture of left humerus without intercondylar fracture    Acute renal failure superimposed on stage 3 chronic kidney disease (HCC)     Past Medical History:   Diagnosis Date    Anxiety     Arthritis     Back pain     Depression     Hyperlipidemia     Hypertension     Osteoporosis       Past Surgical History:   Procedure Laterality Date    COLONOSCOPY      ENDOSCOPY, COLON, DIAGNOSTIC      EYE SURGERY      carmelo lense implants    HYSTERECTOMY      JOINT REPLACEMENT      NERVE BLOCK Left 03/28/2017    leftlumbar transforaminal NB #1     NERVE BLOCK Left 04/04/2017    Left lumbar transforaminal nerve block #2 L4-5, L5-S1    NERVE BLOCK Left 06/13/2017    transforaminal nerve block, lumbar #3    NERVE BLOCK Left 08/15/2017    paravertebral facet left lumbar #1    OTHER SURGICAL HISTORY  02/10/2017    CT developed, well nourished, no acute distress, non-toxic appearance   Eyes:  PERRL, conjunctiva normal   HENT:  Atraumatic, external ears normal, nose normal, oropharynx moist, no pharyngeal exudates. Neck- normal range of motion, no tenderness, supple   Respiratory:  No respiratory distress, normal breath sounds, no rales, no wheezing   Cardiovascular:  Normal rate, normal rhythm, no murmurs, no gallops, no rubs   GI:  Soft, nondistended, normal bowel sounds, nontender, no organomegaly, no mass, no rebound, no guarding   :  No costovertebral angle tenderness   Musculoskeletal: Close fracture left humerus  Integument: Laceration on face.   Lymphatic:  No lymphadenopathy noted   Neurologic:  Alert & oriented x 3, CN 2-12 normal, normal motor function, normal sensory function, no focal deficits noted   Psychiatric:  Speech and behavior appropriate       Labs:  CBC:   Lab Results   Component Value Date    WBC 5.4 05/12/2020    RBC 2.66 05/12/2020    HGB 8.3 05/12/2020    HCT 25.4 05/12/2020    MCV 95.5 05/12/2020    MCH 31.2 05/12/2020    MCHC 32.7 05/12/2020    RDW 16.0 05/12/2020     05/12/2020    MPV 9.5 05/12/2020     CMP:    Lab Results   Component Value Date     05/12/2020    K 5.0 05/12/2020    K 4.2 12/04/2018     05/12/2020    CO2 23 05/12/2020    BUN 54 05/12/2020    CREATININE 2.6 05/12/2020    GFRAA 21 05/12/2020    LABGLOM 18 05/12/2020    GLUCOSE 104 05/12/2020    GLUCOSE 76 06/04/2012    PROT 5.9 05/12/2020    LABALBU 3.2 05/12/2020    LABALBU 4.0 06/04/2012    CALCIUM 8.8 05/12/2020    BILITOT 0.3 05/12/2020    ALKPHOS 211 05/12/2020    AST 47 05/12/2020    ALT 42 05/12/2020     PT/INR:    Lab Results   Component Value Date    PROTIME 11.8 12/28/2018    INR 1.0 12/28/2018       Xr Humerus Left (min 2 Views)    Result Date: 5/11/2020  EXAMINATION: TWO XRAY VIEWS OF THE LEFT HUMERUS 5/11/2020 6:56 pm COMPARISON: Left shoulder radiograph same day HISTORY: [address] malalignment. Ct Head Wo Contrast    Result Date: 5/11/2020  EXAMINATION: CT OF THE HEAD WITHOUT CONTRAST  5/11/2020 1:58 pm TECHNIQUE: CT of the head was performed without the administration of intravenous contrast. Dose modulation, iterative reconstruction, and/or weight based adjustment of the mA/kV was utilized to reduce the radiation dose to as low as reasonably achievable. COMPARISON: Previous head CT from 12/30/2018 HISTORY: ORDERING SYSTEM PROVIDED HISTORY: Evaluate intracranial abnormality TECHNOLOGIST PROVIDED HISTORY: Has a \"code stroke\" or \"stroke alert\" been called? ->No Reason for exam:->Evaluate intracranial abnormality FINDINGS: BRAIN/VENTRICLES: There is no acute intracranial hemorrhage, mass effect or midline shift. No abnormal extra-axial fluid collection. The gray-white differentiation is maintained without evidence of an acute infarct. There is no evidence of hydrocephalus. ORBITS: The visualized portion of the orbits demonstrate no acute abnormality. SINUSES: The visualized paranasal sinuses and mastoid air cells demonstrate no acute abnormality; mild mucosal thickening noted maxillary antra, several ethmoid air cells, and sphenoid sinuses. No air-fluid level. SOFT TISSUES/SKULL:  No acute abnormality of the visualized skull or soft tissues. No acute intracranial abnormality. Mild nonacute appearing sinus disease. Ct Facial Bones Wo Contrast    Result Date: 5/11/2020  EXAMINATION: CT OF THE FACE WITHOUT CONTRAST  5/11/2020 1:58 pm TECHNIQUE: CT of the face was performed without the administration of intravenous contrast. Multiplanar reformatted images are provided for review. Dose modulation, iterative reconstruction, and/or weight based adjustment of the mA/kV was utilized to reduce the radiation dose to as low as reasonably achievable.  COMPARISON: None HISTORY: ORDERING SYSTEM PROVIDED HISTORY: Trauma TECHNOLOGIST PROVIDED HISTORY: Reason for exam:->Trauma Left facial pain

## 2020-05-13 NOTE — CARE COORDINATION
SS Note: Consult noted for SNF placement however spoke with pt's  and grandson, Matthew Feliciano by phone and family prefer to bring pt home with [facility] and family available to provide supervision and assistance needed, referral made to [facility] liaison who will follow and recd the [facility] orders. Kelsey Tejada. 5/13/2020.10:27 AM.

## 2020-05-13 NOTE — PROGRESS NOTES
nursing notified. Patient would benefit from continued skilled Physical Therapy to improve functional independence and quality of life. PLAN:    Patient is making fair progress towards established goals. Will continue with current Plan of care.       Time in  1043 am  Time out  1058 am    Total Treatment Time  15  minutes    CPT codes:  Therapeutic exercises (51763)   15 minutes  1 unit(s)    Yesenia Maldonado PTA   LIC # [id number]

## 2020-05-14 VITALS
RESPIRATION RATE: 16 BRPM | WEIGHT: 126 LBS | TEMPERATURE: 98.7 F | DIASTOLIC BLOOD PRESSURE: 72 MMHG | OXYGEN SATURATION: 92 % | HEIGHT: 60 IN | SYSTOLIC BLOOD PRESSURE: 157 MMHG | HEART RATE: 85 BPM | BODY MASS INDEX: 24.74 KG/M2

## 2020-05-14 LAB
ANION GAP SERPL CALCULATED.3IONS-SCNC: 11 MMOL/L (ref 7–16)
BUN BLDV-MCNC: 33 MG/DL (ref 8–23)
CALCIUM SERPL-MCNC: 8.5 MG/DL (ref 8.6–10.2)
CHLORIDE BLD-SCNC: 108 MMOL/L (ref 98–107)
CO2: 23 MMOL/L (ref 22–29)
CREAT SERPL-MCNC: 1.9 MG/DL (ref 0.5–1)
GFR AFRICAN AMERICAN: 30
GFR NON-AFRICAN AMERICAN: 25 ML/MIN/1.73
GLUCOSE BLD-MCNC: 100 MG/DL (ref 74–99)
POTASSIUM SERPL-SCNC: 4.5 MMOL/L (ref 3.5–5)
SARS-COV-2, NAAT: NOT DETECTED
SODIUM BLD-SCNC: 142 MMOL/L (ref 132–146)

## 2020-05-14 PROCEDURE — 6370000000 HC RX 637 (ALT 250 FOR IP): Performed by: INTERNAL MEDICINE

## 2020-05-14 PROCEDURE — 97530 THERAPEUTIC ACTIVITIES: CPT

## 2020-05-14 PROCEDURE — 36415 COLL VENOUS BLD VENIPUNCTURE: CPT

## 2020-05-14 PROCEDURE — 97110 THERAPEUTIC EXERCISES: CPT

## 2020-05-14 PROCEDURE — U0002 COVID-19 LAB TEST NON-CDC: HCPCS

## 2020-05-14 PROCEDURE — 80048 BASIC METABOLIC PNL TOTAL CA: CPT

## 2020-05-14 PROCEDURE — 6360000002 HC RX W HCPCS: Performed by: INTERNAL MEDICINE

## 2020-05-14 RX ADMIN — HEPARIN SODIUM 5000 UNITS: 5000 INJECTION, SOLUTION INTRAVENOUS; SUBCUTANEOUS at 06:22

## 2020-05-14 RX ADMIN — HYDRALAZINE HYDROCHLORIDE 25 MG: 25 TABLET, FILM COATED ORAL at 15:21

## 2020-05-14 RX ADMIN — LABETALOL HYDROCHLORIDE 100 MG: 200 TABLET, FILM COATED ORAL at 09:17

## 2020-05-14 RX ADMIN — PRAMIPEXOLE DIHYDROCHLORIDE 0.12 MG: 0.12 TABLET ORAL at 09:17

## 2020-05-14 RX ADMIN — HYDROCODONE BITARTRATE AND ACETAMINOPHEN 1 TABLET: 5; 325 TABLET ORAL at 01:00

## 2020-05-14 RX ADMIN — HYDRALAZINE HYDROCHLORIDE 25 MG: 25 TABLET, FILM COATED ORAL at 06:18

## 2020-05-14 RX ADMIN — PRAMIPEXOLE DIHYDROCHLORIDE 0.12 MG: 0.12 TABLET ORAL at 15:21

## 2020-05-14 RX ADMIN — HYDROCODONE BITARTRATE AND ACETAMINOPHEN 1 TABLET: 5; 325 TABLET ORAL at 06:20

## 2020-05-14 RX ADMIN — HEPARIN SODIUM 5000 UNITS: 5000 INJECTION, SOLUTION INTRAVENOUS; SUBCUTANEOUS at 15:21

## 2020-05-14 ASSESSMENT — PAIN SCALES - PAIN ASSESSMENT IN ADVANCED DEMENTIA (PAINAD)
TOTALSCORE: 7
TOTALSCORE: 7
CONSOLABILITY: 2
FACIALEXPRESSION: 1
NEGVOCALIZATION: 2
BODYLANGUAGE: 1
BODYLANGUAGE: 1
BREATHING: 1
CONSOLABILITY: 2
FACIALEXPRESSION: 1
BREATHING: 1
NEGVOCALIZATION: 2

## 2020-05-14 ASSESSMENT — PAIN DESCRIPTION - ONSET
ONSET: ON-GOING
ONSET: ON-GOING

## 2020-05-14 ASSESSMENT — PAIN DESCRIPTION - FREQUENCY
FREQUENCY: CONTINUOUS
FREQUENCY: CONTINUOUS

## 2020-05-14 ASSESSMENT — PAIN DESCRIPTION - ORIENTATION
ORIENTATION: LEFT
ORIENTATION: LEFT

## 2020-05-14 ASSESSMENT — PAIN DESCRIPTION - PROGRESSION
CLINICAL_PROGRESSION: GRADUALLY WORSENING
CLINICAL_PROGRESSION: GRADUALLY WORSENING

## 2020-05-14 ASSESSMENT — PAIN SCALES - GENERAL
PAINLEVEL_OUTOF10: 7
PAINLEVEL_OUTOF10: 0

## 2020-05-14 ASSESSMENT — PAIN DESCRIPTION - DESCRIPTORS
DESCRIPTORS: ACHING
DESCRIPTORS: ACHING

## 2020-05-14 ASSESSMENT — PAIN DESCRIPTION - LOCATION
LOCATION: ARM;SHOULDER
LOCATION: ARM;SHOULDER

## 2020-05-14 ASSESSMENT — PAIN DESCRIPTION - PAIN TYPE
TYPE: ACUTE PAIN
TYPE: ACUTE PAIN

## 2020-05-14 NOTE — CARE COORDINATION
SS Note/Discharge plan:  Notified by [facility] \[facility]\"" admissions for [facility] that they have accepted pt for acute rehab admission and pt may be transferred once COVID 19 TESTING is done and negative result confirmed, nursing notified, sw/cm will follow to confirm pt's transfer arrangements. Electronically signed by RODRIGUEZ Levy on 5/14/2020 at 1:49 PM

## 2020-05-14 NOTE — PROGRESS NOTES
sit: Dependent of  2   Sit to supine: Dependent of  2   Scooting: Dependent of  2  Sat at side of bed x 10 min max assist x 1 posterior  Rolling: Moderate assist of 1    Supine to sit: Moderate assist of 1    Sit to supine: Moderate assist of 1    Scooting: Moderate assist of 1     Transfers Sit to stand: Maximal assist of  2 chux as sling Sit to stand: Not assessed     Sit to stand: Moderate assist of 1     Ambulation    3 side steps using  hand held assist with Maximal assist of  2   assist to weight shift and advance Right lower extremity chux as sling not assessed   10 feet using  hemicane with Moderate assist of 1    Stair negotiation: ascended and descended   Not assessed            ROM Left upper extremitiy not assessed limited mobility Right upper extremitiy due to pain with movemenet    Increase range of motion 10% of affected joints    Strength BUE: refer to OT eval  RLE:  3/5  LLE:  3/5   Increase strength in affected mm groups by 1/3 grade   Balance Sitting EOB:  poor progressing to fair  Dynamic Standing:  poor +2    Sitting EOB:  good -  Dynamic Standing: fair saman cane       Patient is Alert & Oriented x person, place and time  and follows directions    Sensation:  Pt denies numbness and tingling to extremities  Edema:  no     Patient education  Patient educated on role of Physical Therapy, risks of immobility and plan of care     Patient response to education:   Pt verbalized understanding Pt demonstrated skill Pt requires further education in this area   No Partial Yes       ASSESSMENT:    Comments:  Pt  More alert this rx session able to answer given questions   Treatment:  Patient practiced and was instructed in the following treatment:      Sat edge of bed 10 minutes with Maximal assist of  2 to increase dynamic sitting balance and activity tolerance. Therapeutic Exercises: ankle pumps and long arc quad also R UE limited shld flexion , elbow flexion ext   x  10 15  reps.  AAROM      At end of

## 2020-05-14 NOTE — CARE COORDINATION
SS Note/Discharge plan:  Referral was made to [facility] and orders in Epic, agency notified and will follow and arrange home care visits with pt/family following anticipated d/c home today however nursing report pt has remained confused and PT/OT continue to recommend [facility], vm message left for family and awaiting call back to confirm home care plan vs temporary SNF placement, per cm, physician advisor reviewed and pt was kept as an [facility] admission and has a 3 day qualifying hospital stay to utilize her SNF/[facility] benefit. Sw/loreta to follow. Electronically signed by RODRIGUEZ Anderson on 5/14/2020 at 10:21 AM

## 2020-05-14 NOTE — PROGRESS NOTES
[facility] Quality Flow/Interdisciplinary Rounds Progress Note        Quality Flow Rounds held on May 14, 2020    Disciplines Attending:  Bedside Nurse, ,  and Nursing Unit Leadership    Cassidy Pozo was admitted on 5/11/2020  1:00 PM    Anticipated Discharge Date:  Expected Discharge Date: 05/14/20    Disposition:    Angel Score:  Angel Scale Score: 18    Readmission Risk              Risk of Unplanned Readmission:        12           Discussed patient goal for the day, patient clinical progression, and barriers to discharge.   The following Goal(s) of the Day/Commitment(s) have been identified:  Probable discharge home today with home care      Marvin Baumann  May 14, 2020

## 2020-05-14 NOTE — CARE COORDINATION
SS Note/Discharge plan:  Contacted by pt's , Kacey Ellis who relayed that he did speak with his family and he is now agreeable to a temporary rehab placement but prefers [facility] where he says his wife was in the past, sw explained criteria for Acute vs [facility],  referral made to [facility] admissions liaison and awaiting chart review and acceptance for admission today 5/14. Nursing informed.  Electronically signed by RODRIGUEZ Vora on 5/14/2020 at 1:11 PM

## 2020-05-15 LAB
BLOOD BANK DISPENSE STATUS: NORMAL
BLOOD BANK DISPENSE STATUS: NORMAL
BLOOD BANK PRODUCT CODE: NORMAL
BLOOD BANK PRODUCT CODE: NORMAL
BPU ID: NORMAL
BPU ID: NORMAL
DESCRIPTION BLOOD BANK: NORMAL
DESCRIPTION BLOOD BANK: NORMAL

## 2020-05-15 NOTE — DISCHARGE SUMMARY
persistent displacement of the fracture distally at the level of the mid to distal diaphysis. Spiral fracture of the humerus extending from the distal diaphysis to the level of the proximal diaphysis and fracture line extending to involve the surgical neck and greater tuberosity redemonstrated. Bones are osteopenic. 1. Mildly improved alignment of the distal humerus fracture which demonstrates persistent displacement of at least 1 shaft width. 2. Fractures extend into the proximal humerus as noted on previous exam. 3. Osteopenia. Xr Hip Left (2-3 Views)    Result Date: 5/11/2020  EXAMINATION: TWO XRAY VIEWS OF THE LEFT HIP 5/11/2020 3:48 pm COMPARISON: Pelvis and bilateral hip study from 11/22/2016 HISTORY: ORDERING SYSTEM PROVIDED HISTORY: eval for fracture TECHNOLOGIST PROVIDED HISTORY: Reason for exam:->eval for fracture FINDINGS: Interval orthopedic hardware status post instrumented fusion and bone cement placement lower lumbar spine and sacrum. Interbody spacer L5-S1. No acute left hip fracture identified. No dislocation. Right hip intact. Assessment lower lumbar spine and sacrum somewhat limited but no obvious acute fracture seen. Mild degenerative changes. DJD hips and pubic symphysis, mild in degree. No acute fracture or dislocation left hip. Xr Knee Left (1-2 Views)    Result Date: 5/11/2020  EXAMINATION: TWO XRAY VIEWS OF THE LEFT KNEE 5/11/2020 6:57 pm COMPARISON: 12/03/2018 HISTORY: ORDERING SYSTEM PROVIDED HISTORY: pain TECHNOLOGIST PROVIDED HISTORY: Reason for exam:->pain FINDINGS: There is no fracture or malalignment. Chondrocalcinosis is again seen. There is no joint effusion     Osteoarthrosis without fracture or malalignment.      Ct Head Wo Contrast    Result Date: 5/11/2020  EXAMINATION: CT OF THE HEAD WITHOUT CONTRAST  5/11/2020 1:58 pm TECHNIQUE: CT of the head was performed without the administration of intravenous contrast. Dose modulation, iterative reconstruction, orbital fracture identified. The globes are intact. There is no retrobulbar hemorrhage. SINUSES/MASTOIDS:  The paranasal sinuses and mastoid air cells are clear. SOFT TISSUES:  There is no focal soft tissue swelling identified. No acute maxillofacial fracture identified. Ct Cervical Spine Wo Contrast    Result Date: 5/11/2020  EXAMINATION: CT OF THE CERVICAL SPINE WITHOUT CONTRAST 5/11/2020 1:58 pm TECHNIQUE: CT of the cervical spine was performed without the administration of intravenous contrast. Multiplanar reformatted images are provided for review. Dose modulation, iterative reconstruction, and/or weight based adjustment of the mA/kV was utilized to reduce the radiation dose to as low as reasonably achievable. COMPARISON: Plain film study of the cervical spine from 12/03/2018, HISTORY: ORDERING SYSTEM PROVIDED HISTORY: Pain TECHNOLOGIST PROVIDED HISTORY: Reason for exam:->Pain FINDINGS: BONES/ALIGNMENT: There is no acute fracture or traumatic malalignment. Bones are osteopenic. Mild nonacute appearing retrolisthesis C4 and C5 in relationship to adjacent vertebral bodies. DEGENERATIVE CHANGES: Moderate multilevel degenerative changes; intervertebral disc space narrowing with endplate sclerosis and irregularity, some uncovertebral and facet arthropathy and some spondylosis identified. Mild impingement on the canal and neural foramina. SOFT TISSUES: There is no prevertebral soft tissue swelling. Calcified granuloma right lung apex. Probable dependent or atelectatic change posterior visualized right upper lung. 1.5 cm well-marginated hypoechoic right thyroid nodule. Calcification of both carotid bifurcations. No acute abnormality of the cervical spine. Multilevel degenerative changes, as above. 1.5 cm well-marginated hypoechoic right thyroid nodule.  RECOMMENDATIONS: Nonemergent outpatient ultrasound follow-up thyroid     Xr Shoulder Left (min 2 Views)    Result Date: 5/11/2020  EXAMINATION: THREE orthotic placement, neck no JVD no lymphadenopathy, lungs clear to auscultation, normal heart sounds, abdomen soft, no organomegaly, lower extremity no weakness, neurologic no gross neurologic focal deficit. Disposition: Home with home physical therapy    Patient Instructions:      Medication List      CONTINUE taking these medications    hydrALAZINE 25 MG tablet  Commonly known as:  APRESOLINE  Take 1 tablet by mouth every 8 hours     hydrochlorothiazide 12.5 MG tablet  Commonly known as:  HYDRODIURIL  Take 1 tablet by mouth daily     labetalol 100 MG tablet  Commonly known as:  NORMODYNE     pramipexole 0.125 MG tablet  Commonly known as:  MIRAPEX            Activity: As tolerated. Follow-up with Dr. Valencia Martinez post discharge within a week.       Completed By:  Dr. Arabella Chirinos MD, [facility].  6:08 AM  5/15/2020      Electronically signed by Ga Malhotra MD on 5/15/2020 at 6:08 AM

## 2020-05-21 ENCOUNTER — HOSPITAL ENCOUNTER (OUTPATIENT)
Dept: CT IMAGING | Age: 85
Discharge: HOME | End: 2020-05-21
Payer: COMMERCIAL

## 2020-05-21 PROCEDURE — 70450 CT HEAD/BRAIN W/O DYE: CPT

## 2020-06-10 ENCOUNTER — OFFICE VISIT (OUTPATIENT)
Dept: ORTHOPEDIC SURGERY | Age: 85
End: 2020-06-10
Payer: MEDICARE

## 2020-06-10 VITALS — TEMPERATURE: 98 F | BODY MASS INDEX: 21.66 KG/M2 | WEIGHT: 130 LBS | HEIGHT: 65 IN

## 2020-06-10 PROCEDURE — G8420 CALC BMI NORM PARAMETERS: HCPCS | Performed by: ORTHOPAEDIC SURGERY

## 2020-06-10 PROCEDURE — G8427 DOCREV CUR MEDS BY ELIG CLIN: HCPCS | Performed by: ORTHOPAEDIC SURGERY

## 2020-06-10 PROCEDURE — 99213 OFFICE O/P EST LOW 20 MIN: CPT | Performed by: ORTHOPAEDIC SURGERY

## 2020-06-10 PROCEDURE — 1036F TOBACCO NON-USER: CPT | Performed by: ORTHOPAEDIC SURGERY

## 2020-06-10 PROCEDURE — 1123F ACP DISCUSS/DSCN MKR DOCD: CPT | Performed by: ORTHOPAEDIC SURGERY

## 2020-06-10 PROCEDURE — 4040F PNEUMOC VAC/ADMIN/RCVD: CPT | Performed by: ORTHOPAEDIC SURGERY

## 2020-06-10 PROCEDURE — 1090F PRES/ABSN URINE INCON ASSESS: CPT | Performed by: ORTHOPAEDIC SURGERY

## 2020-06-10 PROCEDURE — 1111F DSCHRG MED/CURRENT MED MERGE: CPT | Performed by: ORTHOPAEDIC SURGERY

## 2020-06-10 PROCEDURE — G8399 PT W/DXA RESULTS DOCUMENT: HCPCS | Performed by: ORTHOPAEDIC SURGERY

## 2020-06-10 RX ORDER — ACETAMINOPHEN 325 MG/1
1000 TABLET ORAL 3 TIMES DAILY
Status: ON HOLD | COMMUNITY
End: 2020-10-26 | Stop reason: HOSPADM

## 2020-06-10 NOTE — PROGRESS NOTES
Chief Complaint:   Chief Complaint   Patient presents with    Arm Pain     new patient left humerus fx. Patient had a fall breaking her left humerus. Missael Veras is little over 4 weeks post injury to her left humerus involving humeral shaft and humeral neck fractures. She is in a brace. She stays it is skilled facility. She does not want any surgery. Allergies; medications; past medical, surgical, family, and social history; and problem list have been reviewed today and updated as indicated in this encounter seen below. Exam: She is in the brace which was adjusted slightly to prevent excessive skin pressure near the elbow and forearm at the band. Outside alignment appears to be good. Elbow motion is limited and mobilization was limited because of her known injury. Peers to have no skin lesions related to the brace. Radiographs: Review his images were reviewed showing the fracture morphology of the humerus which is a long spiral fracture. There is also a minimally angulated left humeral neck fracture. Reports of x-rays done 5/31/2020 by [address] [place] [address] portable x-ray service was reported only which suggested no healing of the fracture. Images were not available for review. ASSESSMENT:    Mirian Dexter was seen today for arm pain. Diagnoses and all orders for this visit:    Fx humeral neck, left, closed, initial encounter    Closed fracture of shaft of left humerus, unspecified fracture morphology, initial encounter        PLAN: Skin care to observe and prevent ulcerations or erosions of the skin related to the brace. We will have her x-rayed in a month and hopefully of these films is available for review. No follow-ups on file.        Current Outpatient Medications   Medication Sig Dispense Refill    acetaminophen (TYLENOL) 325 MG tablet Take 650 mg by mouth every 6 hours as needed for Pain      magnesium hydroxide (MILK OF MAGNESIA) 400 MG/5ML suspension Take by mouth daily as needed for Constipation      labetalol (NORMODYNE) 100 MG tablet Take 100 mg by mouth 2 times daily      pramipexole (MIRAPEX) 0.125 MG tablet Take 0.125 mg by mouth 3 times daily      hydrALAZINE (APRESOLINE) 25 MG tablet Take 1 tablet by mouth every 8 hours 90 tablet 3    hydrochlorothiazide (HYDRODIURIL) 12.5 MG tablet Take 1 tablet by mouth daily 30 tablet 3     No current facility-administered medications for this visit.         Patient Active Problem List   Diagnosis    Chronic lower back pain    Bulging lumbar disc    Essential hypertension    Hyperlipidemia    CKD (chronic kidney disease)    RLS (restless legs syndrome)    Lumbar foraminal stenosis    Lumbar facet arthropathy     Lumbosacral spondylosis    Chronic pain    Lumbar disc herniation    Acute blood loss anemia    [facility] ([facility] [address]    Altered mental status    Acute hyperactive delirium due to multiple etiologies    Non-traumatic compression fracture of eleventh thoracic vertebra ([facility] [address]    Adult idiopathic generalized osteoporosis    Closed displaced simple supracondylar fracture of left humerus without intercondylar fracture    Acute renal failure superimposed on stage 3 chronic kidney disease (HCC)       Past Medical History:   Diagnosis Date    Anxiety     Arthritis     Back pain     Depression     Hyperlipidemia     Hypertension     Osteoporosis        Past Surgical History:   Procedure Laterality Date    COLONOSCOPY      ENDOSCOPY, COLON, DIAGNOSTIC      EYE SURGERY      carmelo lense implants    HYSTERECTOMY      JOINT REPLACEMENT      NERVE BLOCK Left 03/28/2017    leftlumbar transforaminal NB #1     NERVE BLOCK Left 04/04/2017    Left lumbar transforaminal nerve block #2 L4-5, L5-S1    NERVE BLOCK Left 06/13/2017    transforaminal nerve block, lumbar #3    NERVE BLOCK Left 08/15/2017    paravertebral facet left lumbar #1    OTHER SURGICAL HISTORY  02/10/2017    CT guided lumbar spine bone biopsy    place, and time. and appears well-developed and well-nourished. :   Head: Normocephalic and atraumatic. Eyes: EOM are normal.   Neck: Neck supple. Cardiovascular: Normal rate and regular rhythm. Pulmonary/Chest: Effort normal. No stridor. No respiratory distress, no wheezes. Abdominal:  No abnormal distension. Neurological: Alert and oriented to person, place, and time. Skin: Skin is warm and dry. Psychiatric: Normal mood and affect.  Behavior is normal. Thought content normal.    ANA Vora DO    6/10/20  3:33 PM

## 2020-07-13 ENCOUNTER — OFFICE VISIT (OUTPATIENT)
Dept: ORTHOPEDIC SURGERY | Age: 85
End: 2020-07-13
Payer: MEDICARE

## 2020-07-13 VITALS — HEIGHT: 62 IN | BODY MASS INDEX: 23.92 KG/M2 | WEIGHT: 130 LBS

## 2020-07-13 PROCEDURE — 1090F PRES/ABSN URINE INCON ASSESS: CPT | Performed by: ORTHOPAEDIC SURGERY

## 2020-07-13 PROCEDURE — 99213 OFFICE O/P EST LOW 20 MIN: CPT | Performed by: ORTHOPAEDIC SURGERY

## 2020-07-13 PROCEDURE — 1036F TOBACCO NON-USER: CPT | Performed by: ORTHOPAEDIC SURGERY

## 2020-07-13 PROCEDURE — G8420 CALC BMI NORM PARAMETERS: HCPCS | Performed by: ORTHOPAEDIC SURGERY

## 2020-07-13 PROCEDURE — G8399 PT W/DXA RESULTS DOCUMENT: HCPCS | Performed by: ORTHOPAEDIC SURGERY

## 2020-07-13 PROCEDURE — 4040F PNEUMOC VAC/ADMIN/RCVD: CPT | Performed by: ORTHOPAEDIC SURGERY

## 2020-07-13 PROCEDURE — 1123F ACP DISCUSS/DSCN MKR DOCD: CPT | Performed by: ORTHOPAEDIC SURGERY

## 2020-07-13 PROCEDURE — G8427 DOCREV CUR MEDS BY ELIG CLIN: HCPCS | Performed by: ORTHOPAEDIC SURGERY

## 2020-07-13 NOTE — PROGRESS NOTES
Chief Complaint:   Chief Complaint   Patient presents with    Follow-up     follow up from left humerus FX , patient is wearing a brace. Melissa Godoy is a and half weeks post injury to her left humerus and the humeral neck area and also the shaft. She complains about the brace on her arm that it slides down and gets in the crease of her elbow. There is also been some attention to the mid arm area. She has more questions about when she is going home than in the care of her arm. A family member who was present indicated that goal was to get her back home and will have her walker ambulating. Allergies; medications; past medical, surgical, family, and social history; and problem list have been reviewed today and updated as indicated in this encounter seen below. Exam: The left shoulder range of motion is limited. She does have the arm brace on for stabilization of the shaft fracture. It is possible to move her arm a little at the shoulder to gain access to her arm to remove the brace and evaluate. She does have a small puncture wound in the lateral arm which is right over the spike of the distal fragment of the humeral shaft fracture. She seems to be grossly stable through the fracture of the shaft. There is no active drainage from the wound which is just a very small puncture of only a few millimeters. There is no surrounding erythema or fluctuance. Radiographs: Imaging studies from offsite 7 6 showed the varus alignment of the humeral neck fracture with no obvious change in position. There is a varus malalignment of the shaft fracture of the left humerus with a lateral projection of the proximal end of the distal fragment which would be the one which is created the wound. There appears to be a halo of callus around the fracture itself consistent with healing progress. ASSESSMENT:    Lui Isbell was seen today for follow-up.     Diagnoses and all orders for this visit:    Fx humeral neck, fracture of left humerus without intercondylar fracture    Acute renal failure superimposed on stage 3 chronic kidney disease (HCC)       Past Medical History:   Diagnosis Date    Anxiety     Arthritis     Back pain     Depression     Hyperlipidemia     Hypertension     Osteoporosis        Past Surgical History:   Procedure Laterality Date    COLONOSCOPY      ENDOSCOPY, COLON, DIAGNOSTIC      EYE SURGERY      carmelo lense implants    HYSTERECTOMY      JOINT REPLACEMENT      NERVE BLOCK Left 03/28/2017    leftlumbar transforaminal NB #1     NERVE BLOCK Left 04/04/2017    Left lumbar transforaminal nerve block #2 L4-5, L5-S1    NERVE BLOCK Left 06/13/2017    transforaminal nerve block, lumbar #3    NERVE BLOCK Left 08/15/2017    paravertebral facet left lumbar #1    OTHER SURGICAL HISTORY  02/10/2017    CT guided lumbar spine bone biopsy    PARATHYROID GLAND SURGERY      [place] LUMBAR SPINE FUSN,POST INTRBDY N/A 9/13/2018    L3-S1 FUSION, L4-L5, L5-S1  TRANSFORAMINAL LUMBAR INTERBODY FUSION -- NEEDS CAGES, PLATES, SCREWS, O-ARM, AUDIOLOGY, CHIARA TABLE, CELL SAVER, PLATELET GEL - MEDTRONIC performed by Diana Navarro MD at [facility] [place]       No Known Allergies    Social History     Socioeconomic History    Marital status:      Spouse name: None    Number of children: None    Years of education: None    Highest education level: None   Occupational History    None   Social Needs    Financial resource strain: None    Food insecurity     Worry: None     Inability: None    Transportation needs     Medical: None     Non-medical: None   Tobacco Use    Smoking status: Never Smoker    Smokeless tobacco: Never Used   Substance and Sexual Activity    Alcohol use: No    Drug use: No    Sexual activity: Never   Lifestyle    Physical activity     Days per week: None     Minutes per session: None    Stress: None   Relationships    Social connections     Talks on phone: None     Gets together: None Attends [employer] service: None     Active member of club or organization: None     Attends meetings of clubs or organizations: None     Relationship status: None    Intimate partner violence     Fear of current or ex partner: None     Emotionally abused: None     Physically abused: None     Forced sexual activity: None   Other Topics Concern    None   Social History Narrative    None       Review of Systems  As follows except as previously noted in HPI:  Constitutional: Negative for chills, diaphoresis, fatigue, fever and unexpected weight change. Respiratory: Negative for cough, shortness of breath and wheezing. Cardiovascular: Negative for chest pain and palpitations. Neurological: Negative for dizziness, syncope, cephalgia. GI / : negative  Musculoskeletal: see HPI       Objective:   Physical Exam   Constitutional: Oriented to person, place, and time. and appears well-developed and well-nourished. :   Head: Normocephalic and atraumatic. Eyes: EOM are normal.   Neck: Neck supple. Cardiovascular: Normal rate and regular rhythm. Pulmonary/Chest: Effort normal. No stridor. No respiratory distress, no wheezes. Abdominal:  No abnormal distension. Neurological: Alert and oriented to person, place, and time. Skin: Skin is warm and dry. Psychiatric: Normal mood and affect.  Behavior is normal. Thought content normal.    ANA Fuentes DO    7/13/20  12:31 PM

## 2020-07-29 ENCOUNTER — OFFICE VISIT (OUTPATIENT)
Dept: ORTHOPEDIC SURGERY | Age: 85
End: 2020-07-29
Payer: MEDICARE

## 2020-07-29 VITALS — HEIGHT: 60 IN | TEMPERATURE: 98 F | WEIGHT: 130 LBS | BODY MASS INDEX: 25.52 KG/M2

## 2020-07-29 PROCEDURE — 1090F PRES/ABSN URINE INCON ASSESS: CPT | Performed by: ORTHOPAEDIC SURGERY

## 2020-07-29 PROCEDURE — 99213 OFFICE O/P EST LOW 20 MIN: CPT | Performed by: ORTHOPAEDIC SURGERY

## 2020-07-29 PROCEDURE — 1036F TOBACCO NON-USER: CPT | Performed by: ORTHOPAEDIC SURGERY

## 2020-07-29 PROCEDURE — G8427 DOCREV CUR MEDS BY ELIG CLIN: HCPCS | Performed by: ORTHOPAEDIC SURGERY

## 2020-07-29 PROCEDURE — G8417 CALC BMI ABV UP PARAM F/U: HCPCS | Performed by: ORTHOPAEDIC SURGERY

## 2020-07-29 PROCEDURE — G8399 PT W/DXA RESULTS DOCUMENT: HCPCS | Performed by: ORTHOPAEDIC SURGERY

## 2020-07-29 PROCEDURE — 4040F PNEUMOC VAC/ADMIN/RCVD: CPT | Performed by: ORTHOPAEDIC SURGERY

## 2020-07-29 PROCEDURE — 1123F ACP DISCUSS/DSCN MKR DOCD: CPT | Performed by: ORTHOPAEDIC SURGERY

## 2020-07-29 NOTE — PROGRESS NOTES
Chief Complaint:   Chief Complaint   Patient presents with    Arm Pain     Left Humerus FX DOI 5/11/2020, patient is wearing a brace       Cristiana Finn was seen in follow-up for her left humeral shaft fracture. She is little demented and difficult to communicate with sensibly. Her son was present and we had a discussion about her problems as well. It is known that she has the wound on the left arm from the bone fragment of her humerus and the alignment. She is in a brace as well. Allergies; medications; past medical, surgical, family, and social history; and problem list have been reviewed today and updated as indicated in this encounter seen below. Exam: The puncture wound in the left lateral arm is gotten a little bit bigger. There is some drainage which is little discolored. There is no foul odor at this time. There was a bandage from her skilled care facility. The arm is grossly stable through the fracture area. The brace was replaced. Radiographs: Imaging from portable imaging from the nursing home showed the fracture of the left humerus in a varus malalignment with callus around the fracture which appears to be stabilizing it. There is also the proximal end of the distal fragment which projects laterally and has caused the puncture wound in the skin. ASSESSMENT:    Susy Soares was seen today for arm pain. Diagnoses and all orders for this visit:    Closed fracture of shaft of left humerus, unspecified fracture morphology, initial encounter    Puncture wound of left upper extremity with complication, initial encounter        PLAN: I discussed with Ms. Vashti Mandujano and her son the need to debride the projecting bone and clean up the wound to allow to close up and heal.  The risks and prognosis were reviewed and we will schedule her for treatment. No follow-ups on file.        Current Outpatient Medications   Medication Sig Dispense Refill    acetaminophen (TYLENOL) 325 MG tablet Take 650 mg by mouth every 6 hours as needed for Pain      magnesium hydroxide (MILK OF MAGNESIA) 400 MG/5ML suspension Take by mouth daily as needed for Constipation      pramipexole (MIRAPEX) 0.125 MG tablet Take 0.125 mg by mouth 3 times daily      hydrALAZINE (APRESOLINE) 25 MG tablet Take 1 tablet by mouth every 8 hours 90 tablet 3    hydrochlorothiazide (HYDRODIURIL) 12.5 MG tablet Take 1 tablet by mouth daily 30 tablet 3    labetalol (NORMODYNE) 100 MG tablet Take 100 mg by mouth 2 times daily       No current facility-administered medications for this visit.         Patient Active Problem List   Diagnosis    Chronic lower back pain    Bulging lumbar disc    Essential hypertension    Hyperlipidemia    CKD (chronic kidney disease)    RLS (restless legs syndrome)    Lumbar foraminal stenosis    Lumbar facet arthropathy     Lumbosacral spondylosis    Chronic pain    Lumbar disc herniation    Acute blood loss anemia    [facility]    Altered mental status    Acute hyperactive delirium due to multiple etiologies    Non-traumatic compression fracture of eleventh thoracic vertebra ([facility]    Adult idiopathic generalized osteoporosis    Closed displaced simple supracondylar fracture of left humerus without intercondylar fracture    Acute renal failure superimposed on stage 3 chronic kidney disease (HCC)       Past Medical History:   Diagnosis Date    Anxiety     Arthritis     Back pain     Depression     Hyperlipidemia     Hypertension     Osteoporosis        Past Surgical History:   Procedure Laterality Date    COLONOSCOPY      ENDOSCOPY, COLON, DIAGNOSTIC      EYE SURGERY      carmelo lense implants    HYSTERECTOMY      JOINT REPLACEMENT      NERVE BLOCK Left 03/28/2017    leftlumbar transforaminal NB #1     NERVE BLOCK Left 04/04/2017    Left lumbar transforaminal nerve block #2 L4-5, L5-S1    NERVE BLOCK Left 06/13/2017    transforaminal nerve block, lumbar #3    NERVE BLOCK Left 08/15/2017    paravertebral facet left lumbar #1    OTHER SURGICAL HISTORY  02/10/2017    CT guided lumbar spine bone biopsy    PARATHYROID GLAND SURGERY      [place] LUMBAR SPINE FUSN,POST INTRBDY N/A 9/13/2018    L3-S1 FUSION, L4-L5, L5-S1  TRANSFORAMINAL LUMBAR INTERBODY FUSION -- NEEDS CAGES, PLATES, SCREWS, O-ARM, AUDIOLOGY, CHIARA TABLE, CELL SAVER, PLATELET GEL - MEDTRON[employer] performed by Elkin Gomez MD at [address]       No Known Allergies    Social History     Socioeconomic History    Marital status:      Spouse name: None    Number of children: None    Years of education: None    Highest education level: None   Occupational History    None   Social Needs    Financial resource strain: None    Food insecurity     Worry: None     Inability: None    Transportation needs     Medical: None     Non-medical: None   Tobacco Use    Smoking status: Never Smoker    Smokeless tobacco: Never Used   Substance and Sexual Activity    Alcohol use: No    Drug use: No    Sexual activity: Never   Lifestyle    Physical activity     Days per week: None     Minutes per session: None    Stress: None   Relationships    Social connections     Talks on phone: None     Gets together: None     Attends Confucianist service: None     Active member of club or organization: None     Attends meetings of clubs or organizations: None     Relationship status: None    Intimate partner violence     Fear of current or ex partner: None     Emotionally abused: None     Physically abused: None     Forced sexual activity: None   Other Topics Concern    None   Social History Narrative    None       Review of Systems  As follows except as previously noted in HPI:  Constitutional: Negative for chills, diaphoresis, fatigue, fever and unexpected weight change. Respiratory: Negative for cough, shortness of breath and wheezing. Cardiovascular: Negative for chest pain and palpitations.    Neurological: Negative for dizziness, syncope, cephalgia. GI / : negative  Musculoskeletal: see HPI       Objective:   Physical Exam   Constitutional: Oriented to person, place, and time. and appears well-developed and well-nourished. :   Head: Normocephalic and atraumatic. Eyes: EOM are normal.   Neck: Neck supple. Cardiovascular: Normal rate and regular rhythm. Pulmonary/Chest: Effort normal. No stridor. No respiratory distress, no wheezes. Abdominal:  No abnormal distension. Neurological: Alert and oriented to person, place, and time. Skin: Skin is warm and dry. Psychiatric: Normal mood and affect.  Behavior is normal. Thought content normal.    ANA Kwon, DO    7/29/20  12:36 PM

## 2020-08-03 RX ORDER — DULOXETIN HYDROCHLORIDE 30 MG/1
30 CAPSULE, DELAYED RELEASE ORAL DAILY
COMMUNITY
End: 2021-07-29 | Stop reason: SDUPTHER

## 2020-08-03 RX ORDER — AMLODIPINE BESYLATE 5 MG/1
5 TABLET ORAL DAILY
COMMUNITY
End: 2021-10-19

## 2020-08-03 RX ORDER — BISACODYL 10 MG
10 SUPPOSITORY, RECTAL RECTAL DAILY PRN
COMMUNITY

## 2020-08-03 RX ORDER — SODIUM CHLORIDE, SODIUM LACTATE, POTASSIUM CHLORIDE, CALCIUM CHLORIDE 600; 310; 30; 20 MG/100ML; MG/100ML; MG/100ML; MG/100ML
INJECTION, SOLUTION INTRAVENOUS CONTINUOUS
Status: DISCONTINUED | OUTPATIENT
Start: 2020-08-03 | End: 2020-08-03 | Stop reason: SDUPTHER

## 2020-08-04 ENCOUNTER — HOSPITAL ENCOUNTER (OUTPATIENT)
Age: 85
Setting detail: OUTPATIENT SURGERY
Discharge: NURSING, REHAB OR LONG-TERM CARE | End: 2020-08-04
Attending: ORTHOPAEDIC SURGERY | Admitting: ORTHOPAEDIC SURGERY
Payer: MEDICARE

## 2020-08-04 ENCOUNTER — ANESTHESIA (OUTPATIENT)
Dept: OPERATING ROOM | Age: 85
End: 2020-08-04
Payer: MEDICARE

## 2020-08-04 ENCOUNTER — ANESTHESIA EVENT (OUTPATIENT)
Dept: OPERATING ROOM | Age: 85
End: 2020-08-04
Payer: MEDICARE

## 2020-08-04 VITALS
HEART RATE: 57 BPM | SYSTOLIC BLOOD PRESSURE: 148 MMHG | BODY MASS INDEX: 24.74 KG/M2 | DIASTOLIC BLOOD PRESSURE: 65 MMHG | RESPIRATION RATE: 16 BRPM | HEIGHT: 60 IN | TEMPERATURE: 96.9 F | WEIGHT: 126 LBS | OXYGEN SATURATION: 95 %

## 2020-08-04 VITALS
RESPIRATION RATE: 12 BRPM | OXYGEN SATURATION: 100 % | SYSTOLIC BLOOD PRESSURE: 143 MMHG | DIASTOLIC BLOOD PRESSURE: 83 MMHG

## 2020-08-04 PROBLEM — S42.342P: Status: ACTIVE | Noted: 2020-08-04

## 2020-08-04 PROBLEM — S41.132A: Status: ACTIVE | Noted: 2020-08-04

## 2020-08-04 LAB
ANION GAP SERPL CALCULATED.3IONS-SCNC: 14 MMOL/L (ref 7–16)
BUN BLDV-MCNC: 61 MG/DL (ref 8–23)
CALCIUM SERPL-MCNC: 9.9 MG/DL (ref 8.6–10.2)
CHLORIDE BLD-SCNC: 101 MMOL/L (ref 98–107)
CO2: 25 MMOL/L (ref 22–29)
CREAT SERPL-MCNC: 1.4 MG/DL (ref 0.5–1)
GFR AFRICAN AMERICAN: 43
GFR NON-AFRICAN AMERICAN: 36 ML/MIN/1.73
GLUCOSE BLD-MCNC: 88 MG/DL (ref 74–99)
HCT VFR BLD CALC: 33.7 % (ref 34–48)
HEMOGLOBIN: 11.2 G/DL (ref 11.5–15.5)
MCH RBC QN AUTO: 32 PG (ref 26–35)
MCHC RBC AUTO-ENTMCNC: 33.2 % (ref 32–34.5)
MCV RBC AUTO: 96.3 FL (ref 80–99.9)
PDW BLD-RTO: 15.4 FL (ref 11.5–15)
PLATELET # BLD: 320 E9/L (ref 130–450)
PMV BLD AUTO: 8.5 FL (ref 7–12)
POTASSIUM REFLEX MAGNESIUM: 4.8 MMOL/L (ref 3.5–5)
RBC # BLD: 3.5 E12/L (ref 3.5–5.5)
SODIUM BLD-SCNC: 140 MMOL/L (ref 132–146)
WBC # BLD: 6.6 E9/L (ref 4.5–11.5)

## 2020-08-04 PROCEDURE — 6360000002 HC RX W HCPCS

## 2020-08-04 PROCEDURE — 7100000011 HC PHASE II RECOVERY - ADDTL 15 MIN: Performed by: ORTHOPAEDIC SURGERY

## 2020-08-04 PROCEDURE — 3700000001 HC ADD 15 MINUTES (ANESTHESIA): Performed by: ORTHOPAEDIC SURGERY

## 2020-08-04 PROCEDURE — 3700000000 HC ANESTHESIA ATTENDED CARE: Performed by: ORTHOPAEDIC SURGERY

## 2020-08-04 PROCEDURE — 7100000000 HC PACU RECOVERY - FIRST 15 MIN: Performed by: ORTHOPAEDIC SURGERY

## 2020-08-04 PROCEDURE — 3600000004 HC SURGERY LEVEL 4 BASE: Performed by: ORTHOPAEDIC SURGERY

## 2020-08-04 PROCEDURE — 7100000010 HC PHASE II RECOVERY - FIRST 15 MIN: Performed by: ORTHOPAEDIC SURGERY

## 2020-08-04 PROCEDURE — 7100000001 HC PACU RECOVERY - ADDTL 15 MIN: Performed by: ORTHOPAEDIC SURGERY

## 2020-08-04 PROCEDURE — 24140 PARTIAL EXC BONE HUMERUS: CPT | Performed by: ORTHOPAEDIC SURGERY

## 2020-08-04 PROCEDURE — 80048 BASIC METABOLIC PNL TOTAL CA: CPT

## 2020-08-04 PROCEDURE — 3600000014 HC SURGERY LEVEL 4 ADDTL 15MIN: Performed by: ORTHOPAEDIC SURGERY

## 2020-08-04 PROCEDURE — 6360000002 HC RX W HCPCS: Performed by: ORTHOPAEDIC SURGERY

## 2020-08-04 PROCEDURE — 2709999900 HC NON-CHARGEABLE SUPPLY: Performed by: ORTHOPAEDIC SURGERY

## 2020-08-04 PROCEDURE — 2580000003 HC RX 258: Performed by: ORTHOPAEDIC SURGERY

## 2020-08-04 PROCEDURE — 2580000003 HC RX 258: Performed by: ANESTHESIOLOGY

## 2020-08-04 PROCEDURE — 2500000003 HC RX 250 WO HCPCS

## 2020-08-04 PROCEDURE — 85027 COMPLETE CBC AUTOMATED: CPT

## 2020-08-04 PROCEDURE — 36415 COLL VENOUS BLD VENIPUNCTURE: CPT

## 2020-08-04 RX ORDER — MEPERIDINE HYDROCHLORIDE 25 MG/ML
12.5 INJECTION INTRAMUSCULAR; INTRAVENOUS; SUBCUTANEOUS EVERY 5 MIN PRN
Status: DISCONTINUED | OUTPATIENT
Start: 2020-08-04 | End: 2020-08-04 | Stop reason: HOSPADM

## 2020-08-04 RX ORDER — FENTANYL CITRATE 50 UG/ML
INJECTION, SOLUTION INTRAMUSCULAR; INTRAVENOUS
Status: COMPLETED
Start: 2020-08-04 | End: 2020-08-04

## 2020-08-04 RX ORDER — MORPHINE SULFATE 2 MG/ML
1 INJECTION, SOLUTION INTRAMUSCULAR; INTRAVENOUS EVERY 5 MIN PRN
Status: DISCONTINUED | OUTPATIENT
Start: 2020-08-04 | End: 2020-08-04 | Stop reason: HOSPADM

## 2020-08-04 RX ORDER — DEXAMETHASONE SODIUM PHOSPHATE 4 MG/ML
INJECTION, SOLUTION INTRA-ARTICULAR; INTRALESIONAL; INTRAMUSCULAR; INTRAVENOUS; SOFT TISSUE PRN
Status: DISCONTINUED | OUTPATIENT
Start: 2020-08-04 | End: 2020-08-04 | Stop reason: SDUPTHER

## 2020-08-04 RX ORDER — SODIUM CHLORIDE 9 MG/ML
INJECTION, SOLUTION INTRAVENOUS CONTINUOUS
Status: DISCONTINUED | OUTPATIENT
Start: 2020-08-04 | End: 2020-08-04 | Stop reason: HOSPADM

## 2020-08-04 RX ORDER — NEOSTIGMINE METHYLSULFATE 1 MG/ML
INJECTION, SOLUTION INTRAVENOUS PRN
Status: DISCONTINUED | OUTPATIENT
Start: 2020-08-04 | End: 2020-08-04 | Stop reason: SDUPTHER

## 2020-08-04 RX ORDER — FENTANYL CITRATE 50 UG/ML
INJECTION, SOLUTION INTRAMUSCULAR; INTRAVENOUS
Status: DISCONTINUED
Start: 2020-08-04 | End: 2020-08-04 | Stop reason: HOSPADM

## 2020-08-04 RX ORDER — ONDANSETRON 2 MG/ML
4 INJECTION INTRAMUSCULAR; INTRAVENOUS
Status: DISCONTINUED | OUTPATIENT
Start: 2020-08-04 | End: 2020-08-04 | Stop reason: HOSPADM

## 2020-08-04 RX ORDER — ROPIVACAINE HYDROCHLORIDE 5 MG/ML
INJECTION, SOLUTION EPIDURAL; INFILTRATION; PERINEURAL
Status: DISCONTINUED
Start: 2020-08-04 | End: 2020-08-04 | Stop reason: HOSPADM

## 2020-08-04 RX ORDER — SODIUM CHLORIDE 0.9 % (FLUSH) 0.9 %
10 SYRINGE (ML) INJECTION PRN
Status: DISCONTINUED | OUTPATIENT
Start: 2020-08-04 | End: 2020-08-04 | Stop reason: HOSPADM

## 2020-08-04 RX ORDER — PROPOFOL 10 MG/ML
INJECTION, EMULSION INTRAVENOUS PRN
Status: DISCONTINUED | OUTPATIENT
Start: 2020-08-04 | End: 2020-08-04 | Stop reason: SDUPTHER

## 2020-08-04 RX ORDER — ACETAMINOPHEN AND CODEINE PHOSPHATE 300; 30 MG/1; MG/1
1 TABLET ORAL EVERY 4 HOURS PRN
Qty: 30 TABLET | Refills: 0 | Status: SHIPPED | OUTPATIENT
Start: 2020-08-04 | End: 2020-08-11

## 2020-08-04 RX ORDER — LIDOCAINE HYDROCHLORIDE 20 MG/ML
INJECTION, SOLUTION INTRAVENOUS PRN
Status: DISCONTINUED | OUTPATIENT
Start: 2020-08-04 | End: 2020-08-04 | Stop reason: SDUPTHER

## 2020-08-04 RX ORDER — GLYCOPYRROLATE 1 MG/5 ML
SYRINGE (ML) INTRAVENOUS PRN
Status: DISCONTINUED | OUTPATIENT
Start: 2020-08-04 | End: 2020-08-04 | Stop reason: SDUPTHER

## 2020-08-04 RX ORDER — ROCURONIUM BROMIDE 10 MG/ML
INJECTION, SOLUTION INTRAVENOUS PRN
Status: DISCONTINUED | OUTPATIENT
Start: 2020-08-04 | End: 2020-08-04 | Stop reason: SDUPTHER

## 2020-08-04 RX ORDER — FENTANYL CITRATE 50 UG/ML
INJECTION, SOLUTION INTRAMUSCULAR; INTRAVENOUS PRN
Status: DISCONTINUED | OUTPATIENT
Start: 2020-08-04 | End: 2020-08-04 | Stop reason: SDUPTHER

## 2020-08-04 RX ORDER — FENTANYL CITRATE 50 UG/ML
25 INJECTION, SOLUTION INTRAMUSCULAR; INTRAVENOUS EVERY 5 MIN PRN
Status: DISCONTINUED | OUTPATIENT
Start: 2020-08-04 | End: 2020-08-04 | Stop reason: HOSPADM

## 2020-08-04 RX ORDER — FENTANYL CITRATE 50 UG/ML
50 INJECTION, SOLUTION INTRAMUSCULAR; INTRAVENOUS EVERY 5 MIN PRN
Status: DISCONTINUED | OUTPATIENT
Start: 2020-08-04 | End: 2020-08-04 | Stop reason: HOSPADM

## 2020-08-04 RX ORDER — MIDAZOLAM HYDROCHLORIDE 1 MG/ML
INJECTION INTRAMUSCULAR; INTRAVENOUS
Status: DISCONTINUED
Start: 2020-08-04 | End: 2020-08-04 | Stop reason: HOSPADM

## 2020-08-04 RX ADMIN — FENTANYL CITRATE 100 MCG: 50 INJECTION, SOLUTION INTRAMUSCULAR; INTRAVENOUS at 13:26

## 2020-08-04 RX ADMIN — PROPOFOL 100 MG: 10 INJECTION, EMULSION INTRAVENOUS at 13:26

## 2020-08-04 RX ADMIN — ROCURONIUM BROMIDE 30 MG: 10 SOLUTION INTRAVENOUS at 13:26

## 2020-08-04 RX ADMIN — Medication 3 MG: at 14:14

## 2020-08-04 RX ADMIN — PHENYLEPHRINE HYDROCHLORIDE 100 MCG: 10 INJECTION INTRAVENOUS at 13:40

## 2020-08-04 RX ADMIN — CEFAZOLIN 1 G: 1 INJECTION, POWDER, FOR SOLUTION INTRAMUSCULAR; INTRAVENOUS at 13:23

## 2020-08-04 RX ADMIN — LIDOCAINE HYDROCHLORIDE 40 MG: 20 INJECTION, SOLUTION INTRAVENOUS at 13:26

## 2020-08-04 RX ADMIN — SODIUM CHLORIDE: 9 INJECTION, SOLUTION INTRAVENOUS at 09:30

## 2020-08-04 RX ADMIN — FENTANYL CITRATE 25 MCG: 50 INJECTION, SOLUTION INTRAMUSCULAR; INTRAVENOUS at 14:51

## 2020-08-04 RX ADMIN — Medication 0.6 MG: at 14:14

## 2020-08-04 RX ADMIN — DEXAMETHASONE SODIUM PHOSPHATE 4 MG: 4 INJECTION, SOLUTION INTRAMUSCULAR; INTRAVENOUS at 13:36

## 2020-08-04 ASSESSMENT — PAIN DESCRIPTION - DESCRIPTORS: DESCRIPTORS: ACHING

## 2020-08-04 ASSESSMENT — PAIN DESCRIPTION - PAIN TYPE: TYPE: CHRONIC PAIN

## 2020-08-04 ASSESSMENT — PULMONARY FUNCTION TESTS
PIF_VALUE: 20
PIF_VALUE: 20
PIF_VALUE: 17
PIF_VALUE: 17
PIF_VALUE: 25
PIF_VALUE: 11
PIF_VALUE: 1
PIF_VALUE: 21
PIF_VALUE: 20
PIF_VALUE: 21
PIF_VALUE: 25
PIF_VALUE: 19
PIF_VALUE: 17
PIF_VALUE: 1
PIF_VALUE: 21
PIF_VALUE: 1
PIF_VALUE: 20
PIF_VALUE: 2
PIF_VALUE: 2
PIF_VALUE: 19
PIF_VALUE: 20
PIF_VALUE: 21
PIF_VALUE: 21
PIF_VALUE: 17
PIF_VALUE: 33
PIF_VALUE: 19
PIF_VALUE: 20
PIF_VALUE: 20
PIF_VALUE: 3
PIF_VALUE: 23
PIF_VALUE: 21
PIF_VALUE: 20
PIF_VALUE: 17
PIF_VALUE: 23
PIF_VALUE: 23
PIF_VALUE: 2
PIF_VALUE: 10
PIF_VALUE: 11
PIF_VALUE: 21
PIF_VALUE: 21
PIF_VALUE: 1
PIF_VALUE: 19
PIF_VALUE: 21
PIF_VALUE: 23
PIF_VALUE: 20
PIF_VALUE: 20
PIF_VALUE: 1
PIF_VALUE: 20
PIF_VALUE: 21
PIF_VALUE: 34
PIF_VALUE: 19
PIF_VALUE: 20
PIF_VALUE: 21
PIF_VALUE: 21
PIF_VALUE: 2
PIF_VALUE: 24

## 2020-08-04 ASSESSMENT — PAIN DESCRIPTION - LOCATION: LOCATION: NECK

## 2020-08-04 ASSESSMENT — PAIN - FUNCTIONAL ASSESSMENT
PAIN_FUNCTIONAL_ASSESSMENT: 0-10
PAIN_FUNCTIONAL_ASSESSMENT: PREVENTS OR INTERFERES WITH MANY ACTIVE NOT PASSIVE ACTIVITIES

## 2020-08-04 ASSESSMENT — PAIN SCALES - GENERAL
PAINLEVEL_OUTOF10: 0
PAINLEVEL_OUTOF10: 6
PAINLEVEL_OUTOF10: 0
PAINLEVEL_OUTOF10: 5

## 2020-08-04 ASSESSMENT — PAIN DESCRIPTION - ORIENTATION: ORIENTATION: POSTERIOR

## 2020-08-04 NOTE — ANESTHESIA POSTPROCEDURE EVALUATION
Department of Anesthesiology  Postprocedure Note    Patient: Nga Patton  MRN: [id number]  YOB: 1935  Date of evaluation: 8/4/2020  Time:  3:03 PM     Procedure Summary     Date:  08/04/20 Room / Location:  [address] [place] [address] / [address] [place] [address]    Anesthesia Start:  2066 Anesthesia Stop:  6693    Procedure:  PARTIAL OSTECTOMY LEFT HUMERUS (CPT 61229) (Left Arm Upper) Diagnosis:  (Unspecified fracture of shaft of humerus, left arm, initial encounter for closed fracture. Puncture wound without foreign body of left upper arm, initial encounter)    Surgeon:  Fiona Roman DO Responsible Provider:  Shelby English MD    Anesthesia Type:  general ASA Status:  3          Anesthesia Type: general    Júnior Phase I: Júnior Score: 10    Júnior Phase II:      Last vitals: Reviewed and per EMR flowsheets.        Anesthesia Post Evaluation    Patient location during evaluation: PACU  Patient participation: complete - patient participated  Level of consciousness: awake and alert  Airway patency: patent  Nausea & Vomiting: no vomiting and no nausea  Complications: no  Cardiovascular status: hemodynamically stable  Respiratory status: acceptable  Hydration status: stable

## 2020-08-04 NOTE — ANESTHESIA PRE PROCEDURE
Department of Anesthesiology  Preprocedure Note       Name:  Sapna Tinajero   Age:  81 y.o.  :  1935                                          MRN:  [id number]         Date:  2020      Surgeon: Liset Garcia):  ANA Rosales DO    Procedure: Procedure(s):  PARTIAL OSTECTOMY LEFT HUMERUS (CPT 78778)    Medications prior to admission:   Prior to Admission medications    Medication Sig Start Date End Date Taking?  Authorizing Provider   Protein (PROSOURCE PO) Take 30 mLs by mouth 2 times daily   Yes Historical Provider, MD   DULoxetine (CYMBALTA) 30 MG extended release capsule Take 30 mg by mouth daily   Yes Historical Provider, MD   bisacodyl (DULCOLAX) 10 MG suppository Place 10 mg rectally daily as needed for Constipation   Yes Historical Provider, MD   amLODIPine (NORVASC) 5 MG tablet Take 5 mg by mouth daily   Yes Historical Provider, MD   B Complex-C-E-Zn (STRESS PLUS ZINC) TABS Take 1 tablet by mouth daily   Yes Historical Provider, MD   acetaminophen (TYLENOL) 325 MG tablet Take 1,000 mg by mouth 3 times daily    Yes Historical Provider, MD   magnesium hydroxide (MILK OF MAGNESIA) 400 MG/5ML suspension Take by mouth daily as needed for Constipation   Yes Historical Provider, MD   hydrochlorothiazide (HYDRODIURIL) 12.5 MG tablet Take 1 tablet by mouth daily  Patient taking differently: Take 25 mg by mouth daily  19  Yes Nitin Len Meigs, MD   labetalol (NORMODYNE) 100 MG tablet Take 100 mg by mouth 2 times daily   Yes Historical Provider, MD       Current medications:    Current Facility-Administered Medications   Medication Dose Route Frequency Provider Last Rate Last Dose    ceFAZolin (ANCEF) 1 g in sterile water 10 mL IV syringe  1 g Intravenous On Call to [address], DO        sodium chloride flush 0.9 % injection 10 mL  10 mL Intravenous PRN Jonetta Krabbe, MD        0.9 % sodium chloride infusion   Intravenous Continuous Jonetta Krabbe, MD 10 mL/hr at 20 0930      ropivacaine lumbar spine bone biopsy    PARATHYROID GLAND SURGERY      [place] LUMBAR SPINE FUSN,POST INTRBDY N/A 9/13/2018    L3-S1 FUSION, L4-L5, L5-S1  TRANSFORAMINAL LUMBAR INTERBODY FUSION -- NEEDS CAGES, PLATES, SCREWS, O-ARM, AUDIOLOGY, CHIARA TABLE, CELL SAVER, PLATELET GEL - MEDTRONIC performed by Derek Acuña MD at [address] History:    Social History     Tobacco Use    Smoking status: Never Smoker    Smokeless tobacco: Never Used   Substance Use Topics    Alcohol use: No                                Counseling given: Not Answered      Vital Signs (Current):   Vitals:    08/03/20 1416 08/04/20 0900   BP:  (!) 151/69   Pulse:  61   Resp:  16   Temp:  97.3 °F (36.3 °C)   SpO2:  98%   Weight: 130 lb (59 kg) 126 lb (57.2 kg)   Height: 5' (1.524 m) 5' (1.524 m)                                              BP Readings from Last 3 Encounters:   08/04/20 (!) 151/69   05/14/20 (!) 157/72   08/29/19 (!) 188/96       NPO Status: Time of last liquid consumption: 0001                        Time of last solid consumption: 1700                        Date of last liquid consumption: 08/04/20                        Date of last solid food consumption: 08/03/20    BMI:   Wt Readings from Last 3 Encounters:   08/04/20 126 lb (57.2 kg)   07/29/20 130 lb (59 kg)   07/13/20 130 lb (59 kg)     Body mass index is 24.61 kg/m².     CBC:   Lab Results   Component Value Date    WBC 6.6 08/04/2020    RBC 3.50 08/04/2020    HGB 11.2 08/04/2020    HCT 33.7 08/04/2020    MCV 96.3 08/04/2020    RDW 15.4 08/04/2020     08/04/2020       CMP:   Lab Results   Component Value Date     08/04/2020    K 4.8 08/04/2020     08/04/2020    CO2 25 08/04/2020    BUN 61 08/04/2020    CREATININE 1.4 08/04/2020    GFRAA 43 08/04/2020    LABGLOM 36 08/04/2020    GLUCOSE 88 08/04/2020    GLUCOSE 76 06/04/2012    PROT 5.8 05/13/2020    CALCIUM 9.9 08/04/2020    BILITOT 0.3 05/13/2020    ALKPHOS 185 05/13/2020    AST 51 05/13/2020    ALT 36 05/13/2020       POC Tests: No results for input(s): POCGLU, POCNA, POCK, POCCL, POCBUN, POCHEMO, POCHCT in the last 72 hours. Coags:   Lab Results   Component Value Date    PROTIME 11.8 12/28/2018    INR 1.0 12/28/2018    APTT 29.3 12/28/2018       HCG (If Applicable): No results found for: PREGTESTUR, PREGSERUM, HCG, HCGQUANT     ABGs: No results found for: PHART, PO2ART, JEC3BKM, XTZ4EUJ, BEART, W5WUEOOY     Type & Screen (If Applicable):  No results found for: LABABO, LABRH    Drug/Infectious Status (If Applicable):  No results found for: HIV, HEPCAB    COVID-19 Screening (If Applicable):   Lab Results   Component Value Date    COVID19 Not Detected 05/14/2020         Anesthesia Evaluation  Patient summary reviewed  Airway: Mallampati: II  TM distance: >3 FB   Neck ROM: full  Mouth opening: > = 3 FB Dental:          Pulmonary:Negative Pulmonary ROS breath sounds clear to auscultation                             Cardiovascular:    (+) hypertension:,         Rhythm: regular  Rate: normal                    Neuro/Psych:   (+) psychiatric history:            GI/Hepatic/Renal: Neg GI/Hepatic/Renal ROS  (+) renal disease: CRI,           Endo/Other: Negative Endo/Other ROS   (+) : arthritis:., .                 Abdominal:       Abdomen: soft. Vascular:                                      Anesthesia Plan      general     ASA 3     (At times confused dementia.)        Anesthetic plan and risks discussed with patient. Plan discussed with CRNA.                 Deanna Scherer MD   8/4/2020

## 2020-08-04 NOTE — H&P
History and Physical      CHIEF COMPLAINT: Subacute fracture left humeral shaft with protruding bone with skin wound    HISTORY OF PRESENT ILLNESS:      Previous injury with healing humeral shaft fracture and malalignment with projection of bone ultimately eroding through skin lateral left arm    Past Medical History:        Diagnosis Date    Anxiety     Arthritis     Back pain     CKD (chronic kidney disease)     stage 3    Depression     Fracture of left humerus     Hx of falling     Hyperlipidemia     Hypertension     Osteoporosis     Weakness      Past Surgical History:        Procedure Laterality Date    COLONOSCOPY      ENDOSCOPY, COLON, DIAGNOSTIC      EYE SURGERY      carmelo lense implants    HYSTERECTOMY      JOINT REPLACEMENT      NERVE BLOCK Left 03/28/2017    leftlumbar transforaminal NB #1     NERVE BLOCK Left 04/04/2017    Left lumbar transforaminal nerve block #2 L4-5, L5-S1    NERVE BLOCK Left 06/13/2017    transforaminal nerve block, lumbar #3    NERVE BLOCK Left 08/15/2017    paravertebral facet left lumbar #1    OTHER SURGICAL HISTORY  02/10/2017    CT guided lumbar spine bone biopsy    PARATHYROID GLAND SURGERY      [place] LUMBAR SPINE FUSN,POST INTRBDY N/A 9/13/2018    L3-S1 FUSION, L4-L5, L5-S1  TRANSFORAMINAL LUMBAR INTERBODY FUSION -- NEEDS CAGES, PLATES, SCREWS, O-ARM, AUDIOLOGY, CHIARA TABLE, CELL SAVER, PLATELET GEL - [employer]TRON[employer] performed by Wily Camilo MD at [place] [address] History:    TOBACCO:   reports that she has never smoked. She has never used smokeless tobacco.  ETOH:   reports no history of alcohol use. DRUGS:   reports no history of drug use. Family History:       Problem Relation Age of Onset    Diabetes Mother     High Blood Pressure Mother     Parkinsonism Father     Lung Cancer Brother     Heart Disease Brother      Medications Prior to Admission:  No medications prior to admission. Allergies:  Patient has no known allergies.     CONSTITUTIONAL: negative for  chills and anorexia  HEENT:  negative for  tinnitus  RESPIRATORY:  negative for  dyspnea and cyanosis  CARDIOVASCULAR:  negative for  dyspnea, syncope  GASTROINTESTINAL:  negative for vomiting and hematemesis  GENITOURINARY:  negative for hematuria  ENDOCRINE:  negative for tremor  MUSCULOSKELETAL:  positive for  decreased range of motion  NEUROLOGICAL:  negative for seizures and syncope  BEHAVIOR/PSYCH:  negative for agitated and anxiety    PHYSICAL EXAM:  Ht 5' (1.524 m)   Wt 130 lb (59 kg)   BMI 25.39 kg/m²   General appearance: Frail older lady with some confusion  Neurologic: Occasional disorientation mild dementia  Lungs:  No increased work of breathing, good air exchange, clear to auscultation bilaterally, no crackles or wheezing  Heart:  Normal apical impulse, regular rate and rhythm, normal S1 and S2, no S3 or S4, and no murmur noted  Abdomen:  normal bowel sounds  Skin: warm and dry, no rash or erythema  ENT: tympanic membrane, external ear and ear canal normal bilaterally, oropharynx clear and moist with normal mucous membranes  Musculoskeletal: Limited range of motion left shoulder and elbow.   Lateral wound left arm    General Labs:  CBC:   Lab Results   Component Value Date    WBC 5.4 05/12/2020    RBC 2.66 05/12/2020    HGB 8.3 05/12/2020    HCT 25.4 05/12/2020    MCV 95.5 05/12/2020    RDW 16.0 05/12/2020     05/12/2020     CMP:    Lab Results   Component Value Date     05/14/2020    K 4.5 05/14/2020    K 4.2 12/04/2018     05/14/2020    CO2 23 05/14/2020    BUN 33 05/14/2020    PROT 5.8 05/13/2020     U/A:  No components found for: Miah Beasley, USPGRAV, UPH, [facility], UKETONE, UBILI, UBLOOD, UNITRITE, UUROBIL, [place], [facility], [place], [place], [place], [facility], [place]    Radiology: Esther Wesley left humeral shaft fracture with lateral projecting bone fragment    ASSESSMENT AND PLAN:    Wound debridement and partial ostectomy humerus      Electronically signed by Fiona Roman DO on 8/4/2020 at 7:07 AM

## 2020-08-04 NOTE — PROGRESS NOTES
New depends put on.
Patient having surgery 8/4, she is seeing Dr. Rima Rocha on 7/30 for med clearance and the nursing home will do covid testing, Juanita Vasquez RN at nursing home will fax results of covid in preparation for surgery.
Report called to Tania Duron at [facility], transportation called also.
Spoke with Dr Ryan anaya last dose labetolol. ( last given at 2000. ) No orders given.   Jenn Rose   8/4/2020  10:47 AM
other children with you. 10. Please wear simple, loose fitting clothing to the hospital.  Ancil Peeks not bring valuables (money, credit cards, checkbooks, etc.) Do not wear any makeup (including no eye makeup) or nail polish on your fingers or toes. 11. DO NOT wear any jewelry or piercings on day of surgery. All body piercing jewelry must be removed. 12. Shower the night before surgery with __x_Antibacterial soap /RUTH WIPES________dial wash  Deodorant only no powder or creams    13. TOTAL JOINT REPLACEMENT/HYSTERECTOMY PATIENTS ONLY---Remember to bring Blood Bank bracelet to the hospital on the day of surgery. 14. If you have a Living Will and Durable Power of  for Healthcare, please bring in a copy. 15. If appropriate bring crutches, inspirex, WALKER, CANE etc... 12. Notify your Surgeon if you develop any illness between now and surgery time, cough, cold, fever, sore throat, nausea, vomiting, etc.  Please notify your surgeon if you experience dizziness, shortness of breath or blurred vision between now & the time of your surgery. 17. If you have ___dentures, they will be removed before going to the OR; we will provide you a container. If you wear ___contact lenses or ___glasses, they will be removed; please bring a case for them. 18. To provide excellent care visitors will be limited to 2 in the room at any given time. 19. Please bring picture ID and insurance card. 20. Sleep apnea patients need to bring CPAP AND SETTINGS to hospital on day of surgery. 21. During flu season no children under the age of 15 are permitted in the hospital for the safety of all patients. 22. Other                  Please call AMBULATORY CARE if you have any further questions.    [address]     [address]

## 2020-08-04 NOTE — OP NOTE
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 year old female with history of above preop diagnosis. I explained the risk, benefits, expected outcome, and alternatives to the procedure. Patient understands and is in agreement. PROCEDURE: The patient was brought the operating room after sites identified. Adequate general anesthetic was administered by anesthesia department the patient supine on the operating table. Left upper extremity was then prepped and draped sterile fashion. The puncture wound was examined. An incision was made over this extending proximal distal several centimeters. This allowed opening this up and debridement of the wound was accomplished including subcu and fascial tissues. The prominent bone spike of the distal fragment of the humerus was easily palpated. This was debrided with a rondure down to the plane even with the adjacent cortex. The fracture itself being stable through healing at this point. This removed the bony prominence which was then smoothed further with a rasp. A large quantity sterile saline was used to irrigate the wound which was then closed in layers with absorbable suture with nylon nonabsorbable was in the skin. Aquasol AG and a bulky dressing were applied. Patient's anesthetic was reversed she was taken recovery in stable condition. GROSS PATHOLOGY: Malunion left humeral shaft fracture and varus malalignment with prominent lateral projecting bony spike. Open wound over the bony spike lateral left arm.     All reasonable efforts have

## 2020-08-14 ENCOUNTER — OFFICE VISIT (OUTPATIENT)
Dept: ORTHOPEDIC SURGERY | Age: 85
End: 2020-08-14

## 2020-08-14 VITALS — BODY MASS INDEX: 24.74 KG/M2 | WEIGHT: 126 LBS | HEIGHT: 60 IN

## 2020-08-14 PROCEDURE — 99024 POSTOP FOLLOW-UP VISIT: CPT | Performed by: ORTHOPAEDIC SURGERY

## 2020-08-14 NOTE — PROGRESS NOTES
Chief Complaint:   Chief Complaint   Patient presents with    Arm Pain     Left humerus post op follow up. DOS 8/4/2020       Yesenia Rosenberg is 11 days postop wound debridement and excision of bone prominence left humerus. She is doing well. She is not complaining of any pain. She does have questions about showering and also about using her arm more. She would like to use her walker which would make her more mobile. Allergies; medications; past medical, surgical, family, and social history; and problem list have been reviewed today and updated as indicated in this encounter seen below. Exam: The wound is healing well. Is well approximated. Sutures were removed today. There is no sign of infection or complication. The arm is malunited as we noted before and varus. It is stable through the fracture area. Radiographs: Prior films were reviewed    ASSESSMENT:    Liv Gill was seen today for arm pain. Diagnoses and all orders for this visit:    Puncture wound of left upper extremity with complication, initial encounter    Fx humeral neck, left, closed, initial encounter    Closed fracture of shaft of left humerus, unspecified fracture morphology, initial encounter        PLAN: The patient may go without the left arm brace. She may use her walker with a little bit of pressure on the left arm. She should gradually increase use of it over the next 3 weeks and avoid any strenuous use of the arm. She may shower. We will follow-up as needed. No follow-ups on file.        Current Outpatient Medications   Medication Sig Dispense Refill    Protein (PROSOURCE PO) Take 30 mLs by mouth 2 times daily      DULoxetine (CYMBALTA) 30 MG extended release capsule Take 30 mg by mouth daily      bisacodyl (DULCOLAX) 10 MG suppository Place 10 mg rectally daily as needed for Constipation      amLODIPine (NORVASC) 5 MG tablet Take 5 mg by mouth daily      B Complex-C-E-Zn (STRESS PLUS ZINC) TABS Take 1 tablet by mouth daily      acetaminophen (TYLENOL) 325 MG tablet Take 1,000 mg by mouth 3 times daily       magnesium hydroxide (MILK OF MAGNESIA) 400 MG/5ML suspension Take by mouth daily as needed for Constipation      hydrochlorothiazide (HYDRODIURIL) 12.5 MG tablet Take 1 tablet by mouth daily (Patient taking differently: Take 25 mg by mouth daily ) 30 tablet 3    labetalol (NORMODYNE) 100 MG tablet Take 100 mg by mouth 2 times daily       No current facility-administered medications for this visit.         Patient Active Problem List   Diagnosis    Chronic lower back pain    Bulging lumbar disc    Essential hypertension    Hyperlipidemia    CKD (chronic kidney disease)    RLS (restless legs syndrome)    Lumbar foraminal stenosis    Lumbar facet arthropathy     Lumbosacral spondylosis    Chronic pain    Lumbar disc herniation    Acute blood loss anemia    BABS (acute kidney injury) ([address]    Altered mental status    Acute hyperactive delirium due to multiple etiologies    Non-traumatic compression fracture of eleventh thoracic vertebra ([address]    Adult idiopathic generalized osteoporosis    Closed displaced simple supracondylar fracture of left humerus without intercondylar fracture    Acute renal failure superimposed on stage 3 chronic kidney disease (HCC)    Closed displaced spiral fracture of shaft of left humerus with malunion    Puncture wound of left upper arm with complication       Past Medical History:   Diagnosis Date    Anxiety     Arthritis     Back pain     CKD (chronic kidney disease)     stage 3    Depression     Fracture of left humerus     Hx of falling     Hyperlipidemia     Hypertension     Osteoporosis     Weakness        Past Surgical History:   Procedure Laterality Date    ARM SURGERY Left 8/4/2020    PARTIAL OSTECTOMY LEFT HUMERUS (CPT 62237) performed by Ying Duvall DO at [address] [place] [address], DIAGNOSTIC      EYE SURGERY      carmelo lense implants    HYSTERECTOMY      JOINT REPLACEMENT      NERVE BLOCK Left 03/28/2017    leftlumbar transforaminal NB #1     NERVE BLOCK Left 04/04/2017    Left lumbar transforaminal nerve block #2 L4-5, L5-S1    NERVE BLOCK Left 06/13/2017    transforaminal nerve block, lumbar #3    NERVE BLOCK Left 08/15/2017    paravertebral facet left lumbar #1    OTHER SURGICAL HISTORY  02/10/2017    CT guided lumbar spine bone biopsy    PARATHYROID GLAND SURGERY      NH LUMBAR SPINE FUSN,POST INTRBDY N/A 9/13/2018    L3-S1 FUSION, L4-L5, L5-S1  TRANSFORAMINAL LUMBAR INTERBODY FUSION -- NEEDS CAGES, PLATES, SCREWS, O-ARM, AUDIOLOGY, CHIARA TABLE, CELL SAVER, PLATELET GEL - [employer] performed by Diana Navarro MD at [facility] [place]       No Known Allergies    Social History     Socioeconomic History    Marital status:      Spouse name: None    Number of children: None    Years of education: None    Highest education level: None   Occupational History    None   Social Needs    Financial resource strain: None    Food insecurity     Worry: None     Inability: None    Transportation needs     Medical: None     Non-medical: None   Tobacco Use    Smoking status: Never Smoker    Smokeless tobacco: Never Used   Substance and Sexual Activity    Alcohol use: No    Drug use: No    Sexual activity: Never   Lifestyle    Physical activity     Days per week: None     Minutes per session: None    Stress: None   Relationships    Social connections     Talks on phone: None     Gets together: None     Attends [employer] service: None     Active member of club or organization: None     Attends meetings of clubs or organizations: None     Relationship status: None    Intimate partner violence     Fear of current or ex partner: None     Emotionally abused: None     Physically abused: None     Forced sexual activity: None   Other Topics Concern    None   Social History Narrative    None       Review of Systems  As follows except as previously noted in HPI:  Constitutional: Negative for chills, diaphoresis, fatigue, fever and unexpected weight change. Respiratory: Negative for cough, shortness of breath and wheezing. Cardiovascular: Negative for chest pain and palpitations. Neurological: Negative for dizziness, syncope, cephalgia. GI / : negative  Musculoskeletal: see HPI       Objective:   Physical Exam   Constitutional: Oriented to person, place, and time. and appears well-developed and well-nourished. :   Head: Normocephalic and atraumatic. Eyes: EOM are normal.   Neck: Neck supple. Cardiovascular: Normal rate and regular rhythm. Pulmonary/Chest: Effort normal. No stridor. No respiratory distress, no wheezes. Abdominal:  No abnormal distension. Neurological: Alert and oriented to person, place, and time. Skin: Skin is warm and dry. Psychiatric: Normal mood and affect.  Behavior is normal. Thought content normal.    ANA Fuchs DO    8/14/20  10:48 AM

## 2020-10-22 ENCOUNTER — HOSPITAL ENCOUNTER (OUTPATIENT)
Age: 85
Discharge: HOME | End: 2020-10-22
Payer: MEDICARE

## 2020-10-22 ENCOUNTER — HOSPITAL ENCOUNTER (OUTPATIENT)
Dept: GENERAL RADIOLOGY | Age: 85
Discharge: HOME | End: 2020-10-24
Payer: MEDICARE

## 2020-10-22 ENCOUNTER — HOSPITAL ENCOUNTER (OUTPATIENT)
Age: 85
Discharge: HOME | End: 2020-10-24
Payer: MEDICARE

## 2020-10-22 LAB
ALBUMIN SERPL-MCNC: 3.9 G/DL (ref 3.5–5.2)
ALP BLD-CCNC: 314 U/L (ref 35–104)
ALT SERPL-CCNC: 41 U/L (ref 0–32)
ANION GAP SERPL CALCULATED.3IONS-SCNC: 11 MMOL/L (ref 7–16)
AST SERPL-CCNC: 46 U/L (ref 0–31)
BASOPHILS ABSOLUTE: 0.02 E9/L (ref 0–0.2)
BASOPHILS RELATIVE PERCENT: 0.3 % (ref 0–2)
BILIRUB SERPL-MCNC: <0.2 MG/DL (ref 0–1.2)
BUN BLDV-MCNC: 62 MG/DL (ref 8–23)
C-REACTIVE PROTEIN: 1 MG/DL (ref 0–0.4)
CALCIUM SERPL-MCNC: 10.3 MG/DL (ref 8.6–10.2)
CHLORIDE BLD-SCNC: 100 MMOL/L (ref 98–107)
CO2: 26 MMOL/L (ref 22–29)
CREAT SERPL-MCNC: 2 MG/DL (ref 0.5–1)
EOSINOPHILS ABSOLUTE: 0.24 E9/L (ref 0.05–0.5)
EOSINOPHILS RELATIVE PERCENT: 3.6 % (ref 0–6)
GFR AFRICAN AMERICAN: 29
GFR NON-AFRICAN AMERICAN: 24 ML/MIN/1.73
GLUCOSE BLD-MCNC: 77 MG/DL (ref 74–99)
HCT VFR BLD CALC: 31.6 % (ref 34–48)
HEMOGLOBIN: 10.3 G/DL (ref 11.5–15.5)
IMMATURE GRANULOCYTES #: 0.02 E9/L
IMMATURE GRANULOCYTES %: 0.3 % (ref 0–5)
LYMPHOCYTES ABSOLUTE: 1.58 E9/L (ref 1.5–4)
LYMPHOCYTES RELATIVE PERCENT: 23.7 % (ref 20–42)
MCH RBC QN AUTO: 29.9 PG (ref 26–35)
MCHC RBC AUTO-ENTMCNC: 32.6 % (ref 32–34.5)
MCV RBC AUTO: 91.9 FL (ref 80–99.9)
MONOCYTES ABSOLUTE: 0.55 E9/L (ref 0.1–0.95)
MONOCYTES RELATIVE PERCENT: 8.2 % (ref 2–12)
NEUTROPHILS ABSOLUTE: 4.26 E9/L (ref 1.8–7.3)
NEUTROPHILS RELATIVE PERCENT: 63.9 % (ref 43–80)
PDW BLD-RTO: 14.6 FL (ref 11.5–15)
PLATELET # BLD: 255 E9/L (ref 130–450)
PMV BLD AUTO: 9.1 FL (ref 7–12)
POTASSIUM SERPL-SCNC: 5.3 MMOL/L (ref 3.5–5)
RBC # BLD: 3.44 E12/L (ref 3.5–5.5)
SEDIMENTATION RATE, ERYTHROCYTE: 93 MM/HR (ref 0–20)
SODIUM BLD-SCNC: 137 MMOL/L (ref 132–146)
TOTAL PROTEIN: 7.7 G/DL (ref 6.4–8.3)
TSH SERPL DL<=0.05 MIU/L-ACNC: 6.63 UIU/ML (ref 0.27–4.2)
VITAMIN B-12: 1771 PG/ML (ref 211–946)
WBC # BLD: 6.7 E9/L (ref 4.5–11.5)

## 2020-10-22 PROCEDURE — 85025 COMPLETE CBC W/AUTO DIFF WBC: CPT

## 2020-10-22 PROCEDURE — 80053 COMPREHEN METABOLIC PANEL: CPT

## 2020-10-22 PROCEDURE — 82607 VITAMIN B-12: CPT

## 2020-10-22 PROCEDURE — 86140 C-REACTIVE PROTEIN: CPT

## 2020-10-22 PROCEDURE — 84443 ASSAY THYROID STIM HORMONE: CPT

## 2020-10-22 PROCEDURE — 71046 X-RAY EXAM CHEST 2 VIEWS: CPT

## 2020-10-22 PROCEDURE — 36415 COLL VENOUS BLD VENIPUNCTURE: CPT

## 2020-10-22 PROCEDURE — 85651 RBC SED RATE NONAUTOMATED: CPT

## 2020-10-22 PROCEDURE — 86592 SYPHILIS TEST NON-TREP QUAL: CPT

## 2020-10-23 LAB — RPR: NORMAL

## 2020-10-25 ENCOUNTER — APPOINTMENT (OUTPATIENT)
Dept: GENERAL RADIOLOGY | Age: 85
DRG: 480 | End: 2020-10-25
Payer: MEDICARE

## 2020-10-25 ENCOUNTER — HOSPITAL ENCOUNTER (INPATIENT)
Age: 85
LOS: 8 days | Discharge: NURSING, REHAB OR LONG-TERM CARE | DRG: 480 | End: 2020-11-02
Attending: EMERGENCY MEDICINE | Admitting: SURGERY
Payer: MEDICARE

## 2020-10-25 PROBLEM — T14.90XA TRAUMA: Status: ACTIVE | Noted: 2020-10-25

## 2020-10-25 LAB
ABO/RH: NORMAL
ABO/RH: NORMAL
ACETAMINOPHEN LEVEL: <5 MCG/ML (ref 10–30)
ALBUMIN SERPL-MCNC: 3 G/DL (ref 3.5–5.2)
ALP BLD-CCNC: 211 U/L (ref 35–104)
ALT SERPL-CCNC: 32 U/L (ref 0–32)
AMORPHOUS: ABNORMAL
ANION GAP SERPL CALCULATED.3IONS-SCNC: 14 MMOL/L (ref 7–16)
ANTIBODY SCREEN: NORMAL
APTT: 32.8 SEC (ref 24.5–35.1)
AST SERPL-CCNC: 40 U/L (ref 0–31)
B.E.: -5.6 MMOL/L (ref -3–3)
BACTERIA: ABNORMAL /HPF
BILIRUB SERPL-MCNC: 0.5 MG/DL (ref 0–1.2)
BILIRUBIN URINE: NEGATIVE
BLOOD, URINE: ABNORMAL
BUN BLDV-MCNC: 55 MG/DL (ref 8–23)
CALCIUM SERPL-MCNC: 8.8 MG/DL (ref 8.6–10.2)
CHLORIDE BLD-SCNC: 107 MMOL/L (ref 98–107)
CLARITY: ABNORMAL
CO2: 19 MMOL/L (ref 22–29)
COHB: 0.3 % (ref 0–1.5)
COLOR: YELLOW
CREAT SERPL-MCNC: 2.1 MG/DL (ref 0.5–1)
CRITICAL: ABNORMAL
DATE ANALYZED: ABNORMAL
DATE OF COLLECTION: ABNORMAL
EPITHELIAL CELLS, UA: ABNORMAL /HPF
ETHANOL: <10 MG/DL (ref 0–0.08)
GFR AFRICAN AMERICAN: 27
GFR NON-AFRICAN AMERICAN: 22 ML/MIN/1.73
GLUCOSE BLD-MCNC: 125 MG/DL (ref 74–99)
GLUCOSE URINE: NEGATIVE MG/DL
HCO3: 18.6 MMOL/L (ref 22–26)
HCT VFR BLD CALC: 34 % (ref 34–48)
HEMOGLOBIN: 11.2 G/DL (ref 11.5–15.5)
HHB: 0.6 % (ref 0–5)
INR BLD: 1.1
KETONES, URINE: NEGATIVE MG/DL
LAB: ABNORMAL
LACTIC ACID: 2 MMOL/L (ref 0.5–2.2)
LEUKOCYTE ESTERASE, URINE: ABNORMAL
Lab: ABNORMAL
MCH RBC QN AUTO: 30.4 PG (ref 26–35)
MCHC RBC AUTO-ENTMCNC: 32.9 % (ref 32–34.5)
MCV RBC AUTO: 92.4 FL (ref 80–99.9)
METHB: 0.2 % (ref 0–1.5)
MODE: ABNORMAL
NITRITE, URINE: NEGATIVE
O2 SATURATION: 99.4 % (ref 92–98.5)
O2HB: 98.9 % (ref 94–97)
OPERATOR ID: ABNORMAL
PATIENT TEMP: 37 C
PCO2: 32.6 MMHG (ref 35–45)
PDW BLD-RTO: 14.7 FL (ref 11.5–15)
PH BLOOD GAS: 7.38 (ref 7.35–7.45)
PH UA: 7.5 (ref 5–9)
PLATELET # BLD: 151 E9/L (ref 130–450)
PMV BLD AUTO: 9.6 FL (ref 7–12)
PO2: >500 MMHG (ref 75–100)
POTASSIUM SERPL-SCNC: 4.8 MMOL/L (ref 3.5–5)
POTASSIUM SERPL-SCNC: 4.84 MMOL/L (ref 3.5–5)
PROTEIN UA: NEGATIVE MG/DL
PROTHROMBIN TIME: 11.9 SEC (ref 9.3–12.4)
RBC # BLD: 3.68 E12/L (ref 3.5–5.5)
RBC UA: ABNORMAL /HPF (ref 0–2)
SALICYLATE, SERUM: <0.3 MG/DL (ref 0–30)
SODIUM BLD-SCNC: 140 MMOL/L (ref 132–146)
SOURCE, BLOOD GAS: ABNORMAL
SPECIFIC GRAVITY UA: 1.01 (ref 1–1.03)
THB: 12 G/DL (ref 11.5–16.5)
TIME ANALYZED: 1238
TOTAL PROTEIN: 6.2 G/DL (ref 6.4–8.3)
TRICYCLIC ANTIDEPRESSANTS SCREEN SERUM: NEGATIVE NG/ML
UROBILINOGEN, URINE: 0.2 E.U./DL
WBC # BLD: 13.8 E9/L (ref 4.5–11.5)
WBC UA: ABNORMAL /HPF (ref 0–5)

## 2020-10-25 PROCEDURE — 86900 BLOOD TYPING SEROLOGIC ABO: CPT

## 2020-10-25 PROCEDURE — 85027 COMPLETE CBC AUTOMATED: CPT

## 2020-10-25 PROCEDURE — 80053 COMPREHEN METABOLIC PANEL: CPT

## 2020-10-25 PROCEDURE — 85730 THROMBOPLASTIN TIME PARTIAL: CPT

## 2020-10-25 PROCEDURE — 99284 EMERGENCY DEPT VISIT MOD MDM: CPT

## 2020-10-25 PROCEDURE — 36600 WITHDRAWAL OF ARTERIAL BLOOD: CPT | Performed by: SURGERY

## 2020-10-25 PROCEDURE — 87186 SC STD MICRODIL/AGAR DIL: CPT

## 2020-10-25 PROCEDURE — 6370000000 HC RX 637 (ALT 250 FOR IP): Performed by: STUDENT IN AN ORGANIZED HEALTH CARE EDUCATION/TRAINING PROGRAM

## 2020-10-25 PROCEDURE — 6810039001 HC L1 TRAUMA PRIORITY

## 2020-10-25 PROCEDURE — 73552 X-RAY EXAM OF FEMUR 2/>: CPT

## 2020-10-25 PROCEDURE — 36415 COLL VENOUS BLD VENIPUNCTURE: CPT

## 2020-10-25 PROCEDURE — 86901 BLOOD TYPING SEROLOGIC RH(D): CPT

## 2020-10-25 PROCEDURE — 71045 X-RAY EXAM CHEST 1 VIEW: CPT

## 2020-10-25 PROCEDURE — 6360000002 HC RX W HCPCS: Performed by: STUDENT IN AN ORGANIZED HEALTH CARE EDUCATION/TRAINING PROGRAM

## 2020-10-25 PROCEDURE — 73030 X-RAY EXAM OF SHOULDER: CPT

## 2020-10-25 PROCEDURE — 81001 URINALYSIS AUTO W/SCOPE: CPT

## 2020-10-25 PROCEDURE — 2000000000 HC ICU R&B

## 2020-10-25 PROCEDURE — 82805 BLOOD GASES W/O2 SATURATION: CPT

## 2020-10-25 PROCEDURE — 83605 ASSAY OF LACTIC ACID: CPT

## 2020-10-25 PROCEDURE — P9016 RBC LEUKOCYTES REDUCED: HCPCS

## 2020-10-25 PROCEDURE — 2580000003 HC RX 258: Performed by: STUDENT IN AN ORGANIZED HEALTH CARE EDUCATION/TRAINING PROGRAM

## 2020-10-25 PROCEDURE — 99223 1ST HOSP IP/OBS HIGH 75: CPT | Performed by: SURGERY

## 2020-10-25 PROCEDURE — 87081 CULTURE SCREEN ONLY: CPT

## 2020-10-25 PROCEDURE — 86850 RBC ANTIBODY SCREEN: CPT

## 2020-10-25 PROCEDURE — 99223 1ST HOSP IP/OBS HIGH 75: CPT | Performed by: ORTHOPAEDIC SURGERY

## 2020-10-25 PROCEDURE — G0480 DRUG TEST DEF 1-7 CLASSES: HCPCS

## 2020-10-25 PROCEDURE — 73060 X-RAY EXAM OF HUMERUS: CPT

## 2020-10-25 PROCEDURE — 96374 THER/PROPH/DIAG INJ IV PUSH: CPT

## 2020-10-25 PROCEDURE — 36410 VNPNXR 3YR/> PHY/QHP DX/THER: CPT | Performed by: SURGERY

## 2020-10-25 PROCEDURE — 85610 PROTHROMBIN TIME: CPT

## 2020-10-25 PROCEDURE — 87088 URINE BACTERIA CULTURE: CPT

## 2020-10-25 PROCEDURE — 86923 COMPATIBILITY TEST ELECTRIC: CPT

## 2020-10-25 PROCEDURE — 87077 CULTURE AEROBIC IDENTIFY: CPT

## 2020-10-25 PROCEDURE — 6360000002 HC RX W HCPCS: Performed by: EMERGENCY MEDICINE

## 2020-10-25 PROCEDURE — 80307 DRUG TEST PRSMV CHEM ANLYZR: CPT

## 2020-10-25 PROCEDURE — 72170 X-RAY EXAM OF PELVIS: CPT

## 2020-10-25 PROCEDURE — 84132 ASSAY OF SERUM POTASSIUM: CPT

## 2020-10-25 RX ORDER — AMLODIPINE BESYLATE 5 MG/1
5 TABLET ORAL DAILY
Status: DISCONTINUED | OUTPATIENT
Start: 2020-10-25 | End: 2020-10-25

## 2020-10-25 RX ORDER — KETAMINE HYDROCHLORIDE 10 MG/ML
INJECTION, SOLUTION INTRAMUSCULAR; INTRAVENOUS
Status: DISPENSED
Start: 2020-10-25 | End: 2020-10-26

## 2020-10-25 RX ORDER — SODIUM CHLORIDE 0.9 % (FLUSH) 0.9 %
10 SYRINGE (ML) INJECTION EVERY 12 HOURS SCHEDULED
Status: DISCONTINUED | OUTPATIENT
Start: 2020-10-25 | End: 2020-10-26 | Stop reason: SDUPTHER

## 2020-10-25 RX ORDER — FENTANYL CITRATE 50 UG/ML
25 INJECTION, SOLUTION INTRAMUSCULAR; INTRAVENOUS
Status: DISCONTINUED | OUTPATIENT
Start: 2020-10-25 | End: 2020-10-28

## 2020-10-25 RX ORDER — FENTANYL CITRATE 50 UG/ML
INJECTION, SOLUTION INTRAMUSCULAR; INTRAVENOUS
Status: DISPENSED
Start: 2020-10-25 | End: 2020-10-26

## 2020-10-25 RX ORDER — ACETAMINOPHEN 325 MG/1
650 TABLET ORAL EVERY 4 HOURS PRN
Status: DISCONTINUED | OUTPATIENT
Start: 2020-10-25 | End: 2020-10-29

## 2020-10-25 RX ORDER — POLYETHYLENE GLYCOL 3350 17 G/17G
17 POWDER, FOR SOLUTION ORAL DAILY
Status: DISCONTINUED | OUTPATIENT
Start: 2020-10-25 | End: 2020-11-02 | Stop reason: HOSPADM

## 2020-10-25 RX ORDER — METHOCARBAMOL 500 MG/1
500 TABLET, FILM COATED ORAL 4 TIMES DAILY
Status: DISCONTINUED | OUTPATIENT
Start: 2020-10-25 | End: 2020-10-26

## 2020-10-25 RX ORDER — NALOXONE HYDROCHLORIDE 1 MG/ML
INJECTION INTRAMUSCULAR; INTRAVENOUS; SUBCUTANEOUS
Status: DISPENSED
Start: 2020-10-25 | End: 2020-10-26

## 2020-10-25 RX ORDER — OXYCODONE HYDROCHLORIDE 5 MG/1
5 TABLET ORAL EVERY 4 HOURS PRN
Status: DISCONTINUED | OUTPATIENT
Start: 2020-10-25 | End: 2020-10-26

## 2020-10-25 RX ORDER — ONDANSETRON 2 MG/ML
4 INJECTION INTRAMUSCULAR; INTRAVENOUS EVERY 6 HOURS PRN
Status: DISCONTINUED | OUTPATIENT
Start: 2020-10-25 | End: 2020-11-02 | Stop reason: HOSPADM

## 2020-10-25 RX ORDER — SODIUM CHLORIDE 0.9 % (FLUSH) 0.9 %
10 SYRINGE (ML) INJECTION PRN
Status: DISCONTINUED | OUTPATIENT
Start: 2020-10-25 | End: 2020-10-26 | Stop reason: SDUPTHER

## 2020-10-25 RX ORDER — SODIUM CHLORIDE, SODIUM LACTATE, POTASSIUM CHLORIDE, CALCIUM CHLORIDE 600; 310; 30; 20 MG/100ML; MG/100ML; MG/100ML; MG/100ML
INJECTION, SOLUTION INTRAVENOUS CONTINUOUS
Status: DISCONTINUED | OUTPATIENT
Start: 2020-10-25 | End: 2020-10-27

## 2020-10-25 RX ORDER — FENTANYL CITRATE 50 UG/ML
INJECTION, SOLUTION INTRAMUSCULAR; INTRAVENOUS DAILY PRN
Status: COMPLETED | OUTPATIENT
Start: 2020-10-25 | End: 2020-10-25

## 2020-10-25 RX ORDER — SENNA AND DOCUSATE SODIUM 50; 8.6 MG/1; MG/1
1 TABLET, FILM COATED ORAL 2 TIMES DAILY
Status: DISCONTINUED | OUTPATIENT
Start: 2020-10-25 | End: 2020-11-02 | Stop reason: HOSPADM

## 2020-10-25 RX ORDER — LABETALOL 100 MG/1
100 TABLET, FILM COATED ORAL 2 TIMES DAILY
Status: DISCONTINUED | OUTPATIENT
Start: 2020-10-25 | End: 2020-10-28

## 2020-10-25 RX ADMIN — FENTANYL CITRATE 25 MCG: 50 INJECTION, SOLUTION INTRAMUSCULAR; INTRAVENOUS at 12:36

## 2020-10-25 RX ADMIN — CEFTRIAXONE SODIUM 1 G: 1 INJECTION, POWDER, FOR SOLUTION INTRAMUSCULAR; INTRAVENOUS at 15:22

## 2020-10-25 RX ADMIN — SODIUM CHLORIDE, POTASSIUM CHLORIDE, SODIUM LACTATE AND CALCIUM CHLORIDE: 600; 310; 30; 20 INJECTION, SOLUTION INTRAVENOUS at 13:28

## 2020-10-25 RX ADMIN — Medication 10 ML: at 20:55

## 2020-10-25 ASSESSMENT — PAIN SCALES - PAIN ASSESSMENT IN ADVANCED DEMENTIA (PAINAD)
CONSOLABILITY: 0
BODYLANGUAGE: 0
FACIALEXPRESSION: 0
TOTALSCORE: 1
TOTALSCORE: 1
CONSOLABILITY: 0
BODYLANGUAGE: 0
FACIALEXPRESSION: 0
BODYLANGUAGE: 0
TOTALSCORE: 0
CONSOLABILITY: 0
BREATHING: 0
CONSOLABILITY: 0
TOTALSCORE: 1
FACIALEXPRESSION: 1
CONSOLABILITY: 0
TOTALSCORE: 2
BODYLANGUAGE: 1
FACIALEXPRESSION: 0
NEGVOCALIZATION: 0
BREATHING: 0
FACIALEXPRESSION: 0
NEGVOCALIZATION: 1
BODYLANGUAGE: 0
NEGVOCALIZATION: 0
NEGVOCALIZATION: 1
BREATHING: 0
NEGVOCALIZATION: 1

## 2020-10-25 NOTE — ED PROVIDER NOTES
---------------------------------------------  Past Medical History:  has a past medical history of Anxiety, Arthritis, Back pain, CKD (chronic kidney disease), Depression, Fracture of left humerus, Hx of falling, Hyperlipidemia, Hypertension, Osteoporosis, and Weakness. Past Surgical History:  has a past surgical history that includes other surgical history (02/10/2017); Hysterectomy; Nerve Block (Left, 03/28/2017); Nerve Block (Left, 04/04/2017); Nerve Block (Left, 06/13/2017); Nerve Block (Left, 08/15/2017); eye surgery; Colonoscopy; Parathyroid gland surgery; pr lumbar spine fusn,post intrbdy (N/A, 9/13/2018); Endoscopy, colon, diagnostic; joint replacement; and Arm Surgery (Left, 8/4/2020). Social History:  reports that she has never smoked. She has never used smokeless tobacco. She reports that she does not drink alcohol or use drugs. Family History: family history includes Diabetes in her mother; Heart Disease in her brother; High Blood Pressure in her mother; Lung Cancer in her brother; Parkinsonism in her father. Unless otherwise noted, family history is non contributory    The patients home medications have been reviewed. Allergies: Patient has no known allergies. ------------------------- NURSING NOTES AND VITALS REVIEWED ---------------------------   The nursing notes within the ED encounter and vital signs as below have been reviewed. /64   Pulse 81   Temp 93.2 °F (34 °C) (Rectal) Comment: bear hugger in place  Resp (!) 34   Ht 5' (1.524 m)   Wt 132 lb 4.4 oz (60 kg)   SpO2 97%   BMI 25.83 kg/m²   Oxygen Saturation Interpretation: Normal    The patients available past medical records and past encounters were reviewed.           -------------------------------------------------- RESULTS -------------------------------------------------  All laboratory and radiology tests have been reviewed by myself  LABS:  Results for orders placed or performed during the hospital encounter of 10/25/20   Comprehensive Metabolic Panel   Result Value Ref Range    Sodium 140 132 - 146 mmol/L    Potassium 4.8 3.5 - 5.0 mmol/L    Chloride 107 98 - 107 mmol/L    CO2 19 (L) 22 - 29 mmol/L    Anion Gap 14 7 - 16 mmol/L    Glucose 125 (H) 74 - 99 mg/dL    BUN 55 (H) 8 - 23 mg/dL    CREATININE 2.1 (H) 0.5 - 1.0 mg/dL    GFR Non-African American 22 >=60 mL/min/1.73    GFR African American 27     Calcium 8.8 8.6 - 10.2 mg/dL    Total Protein 6.2 (L) 6.4 - 8.3 g/dL    Alb 3.0 (L) 3.5 - 5.2 g/dL    Total Bilirubin 0.5 0.0 - 1.2 mg/dL    Alkaline Phosphatase 211 (H) 35 - 104 U/L    ALT 32 0 - 32 U/L    AST 40 (H) 0 - 31 U/L   CBC   Result Value Ref Range    WBC 13.8 (H) 4.5 - 11.5 E9/L    RBC 3.68 3.50 - 5.50 E12/L    Hemoglobin 11.2 (L) 11.5 - 15.5 g/dL    Hematocrit 34.0 34.0 - 48.0 %    MCV 92.4 80.0 - 99.9 fL    MCH 30.4 26.0 - 35.0 pg    MCHC 32.9 32.0 - 34.5 %    RDW 14.7 11.5 - 15.0 fL    Platelets 180 835 - 912 E9/L    MPV 9.6 7.0 - 12.0 fL   Protime-INR   Result Value Ref Range    Protime 11.9 9.3 - 12.4 sec    INR 1.1    APTT   Result Value Ref Range    aPTT 32.8 24.5 - 35.1 sec   Lactic Acid, Plasma   Result Value Ref Range    Lactic Acid 2.0 0.5 - 2.2 mmol/L   Serum Drug Screen   Result Value Ref Range    Ethanol Lvl <10 mg/dL    Acetaminophen Level <5.0 (L) 10.0 - 84.3 mcg/mL    Salicylate, Serum <9.7 0.0 - 30.0 mg/dL    TCA Scrn NEGATIVE Cutoff:300 ng/mL   Blood Gas, Arterial   Result Value Ref Range    Date Analyzed 20201025     Time Analyzed 1238     Source: Blood Arterial     pH, Blood Gas 7.375 7.350 - 7.450    PCO2 32.6 (L) 35.0 - 45.0 mmHg    PO2 >500.0 75.0 - 100.0 mmHg    HCO3 18.6 (L) 22.0 - 26.0 mmol/L    B.E. -5.6 (L) -3.0 - 3.0 mmol/L    O2 Sat 99.4 (H) 92.0 - 98.5 %    O2Hb 98.9 (H) 94.0 - 97.0 %    COHb 0.3 0.0 - 1.5 %    MetHb 0.2 0.0 - 1.5 %    HHb 0.6 0.0 - 5.0 %    tHb (est) 12.0 11.5 - 16.5 g/dL    Potassium 4.84 3.50 - 5.00 mmol/L    Mode NRB 15L     Date Of Collection range)   polyethylene glycol (GLYCOLAX) packet 17 g (17 g Oral Not Given 10/25/20 1756)   sennosides-docusate sodium (SENOKOT-S) 8.6-50 MG tablet 1 tablet (1 tablet Oral Not Given 10/25/20 2050)   ondansetron (ZOFRAN) injection 4 mg (has no administration in time range)   cefTRIAXone (ROCEPHIN) 1 g in sterile water 10 mL IV syringe (1 g Intravenous New Bag 10/25/20 1522)   methocarbamol (ROBAXIN) tablet 500 mg (500 mg Oral Not Given 10/25/20 2050)   labetalol (NORMODYNE) tablet 100 mg (100 mg Oral Not Given 10/25/20 2049)   fentaNYL (SUBLIMAZE) injection (25 mcg Intravenous Given 10/25/20 1236)         Medical Decision Making:    Patient presents to the ED for evaluation. This patient was seen and evaluated with the attending. Work-up was performed with concerns for but not limited to Fracture, Dislocation, Strain, intra-abdominal hemorrhage, pneumothorax and Intracranial hemorrhage. FAST exam performed at bedside demonstrating some trace fluid present in the abdomen. He was given fentanyl upon arrival for pain management. Patient disposition will be determined by the trauma service. Consultations:             Trauma Surgery    Critical Care: 30 min        This patient's ED course included: a personal history and physicial examination, IV medications, cardiac monitoring, continuous pulse oximetry and complex medical decision making and emergency management    This patient has initially deteriorated, but then stabilized during their ED course.         --------------------------------- IMPRESSION AND DISPOSITION ---------------------------------    IMPRESSION  1. Closed intertrochanteric fracture of hip, left, initial encounter ([facility]    2. Trauma    3.  Closed fracture of proximal end of left humerus, unspecified fracture morphology, initial encounter        DISPOSITION  Disposition: as per consultation   Patient condition is serious           Prudy DO Leyla Adler  10/25/20 [id number]

## 2020-10-25 NOTE — PLAN OF CARE
Problem: Pain:  Goal: Control of acute pain  Description: Control of acute pain  Outcome: Met This Shift     Problem: Skin Integrity:  Goal: Absence of new skin breakdown  Description: Absence of new skin breakdown  Outcome: Met This Shift     Problem: Falls - Risk of:  Goal: Will remain free from falls  Description: Will remain free from falls  Outcome: Met This Shift     Problem: Falls - Risk of:  Goal: Absence of physical injury  Description: Absence of physical injury  Outcome: Met This Shift

## 2020-10-25 NOTE — ED NOTES
Pt being rolled while maintaining C-spine. No step offs, deformities, or tenderness.  Ecchymosis to L elbow     Kathy Paul RN  10/25/20 9216

## 2020-10-25 NOTE — CONSULTS
Department of Orthopedic Surgery   Consult Note          Reason for Consult: Left proximal humerus fracture, left intertrochanteric fracture    HISTORY OF PRESENT ILLNESS:       Patient is a 81 y.o. female who presents with the above findings. Patient has a history of dementia and cannot provide history but will follow simple commands. According to communications by nursing to family, patient was found in her hallway after an unwitnessed fall. She normally ambulates with with assistance and a wheeled walker, however she was not ambulating with walker at the time of fall. Complicated history with multiple fractures in the past.  Radiographic evidence of right proximal humerus fracture that is healed in a malunited position. In addition she has a history of a left humeral shaft fracture that was treated nonoperatively and is since healed. A chest x-ray taken for pneumonia on October 22 demonstrated a left proximal humerus fracture with minimal displacement. X-rays in the trauma bay demonstrated further displacement of the left proximal humerus fracture as well as a left intertrochanteric hip fracture. Denies numbness/tingling/paresthesias. Denies any other orthopedic complaints at this time.       Past Medical History:        Diagnosis Date    Anxiety     Arthritis     Back pain     CKD (chronic kidney disease)     stage 3    Depression     Fracture of left humerus     Hx of falling     Hyperlipidemia     Hypertension     Osteoporosis     Weakness      Past Surgical History:        Procedure Laterality Date    ARM SURGERY Left 8/4/2020    PARTIAL OSTECTOMY LEFT HUMERUS (CPT 62453) performed by Deejay Gomez DO at [address] [place] [address], DIAGNOSTIC      EYE SURGERY      carmelo lense implants    HYSTERECTOMY      JOINT REPLACEMENT      NERVE BLOCK Left 03/28/2017    leftlumbar transforaminal NB #1     NERVE BLOCK Left 04/04/2017    Left lumbar transforaminal nerve block #2 L4-5, L5-S1    NERVE BLOCK Left 06/13/2017    transforaminal nerve block, lumbar #3    NERVE BLOCK Left 08/15/2017    paravertebral facet left lumbar #1    OTHER SURGICAL HISTORY  02/10/2017    CT guided lumbar spine bone biopsy    PARATHYROID GLAND SURGERY      SC LUMBAR SPINE FUSN,POST INTRBDY N/A 9/13/2018    L3-S1 FUSION, L4-L5, L5-S1  TRANSFORAMINAL LUMBAR INTERBODY FUSION -- NEEDS CAGES, PLATES, SCREWS, O-ARM, AUDIOLOGY, CHIARA TABLE, CELL SAVER, PLATELET GEL - MEDTRONIC performed by Princess Wilmer MD at [facility] OR     Current Medications:   Current Facility-Administered Medications: sodium chloride flush 0.9 % injection 10 mL, 10 mL, Intravenous, 2 times per day  sodium chloride flush 0.9 % injection 10 mL, 10 mL, Intravenous, PRN  acetaminophen (TYLENOL) tablet 650 mg, 650 mg, Oral, Q4H PRN  lactated ringers infusion, , Intravenous, Continuous  oxyCODONE (ROXICODONE) immediate release tablet 5 mg, 5 mg, Oral, Q4H PRN  fentaNYL (SUBLIMAZE) injection 25 mcg, 25 mcg, Intravenous, Q1H PRN  magnesium hydroxide (MILK OF MAGNESIA) 400 MG/5ML suspension 30 mL, 30 mL, Oral, Daily PRN  polyethylene glycol (GLYCOLAX) packet 17 g, 17 g, Oral, Daily  sennosides-docusate sodium (SENOKOT-S) 8.6-50 MG tablet 1 tablet, 1 tablet, Oral, BID  ondansetron (ZOFRAN) injection 4 mg, 4 mg, Intravenous, Q6H PRN  cefTRIAXone (ROCEPHIN) 1 g in sterile water 10 mL IV syringe, 1 g, Intravenous, Q24H  methocarbamol (ROBAXIN) tablet 500 mg, 500 mg, Oral, 4x Daily  Allergies:  Patient has no known allergies. Social History:   TOBACCO:   reports that she has never smoked. She has never used smokeless tobacco.  ETOH:   reports no history of alcohol use. DRUGS:   reports no history of drug use.   ACTIVITIES OF DAILY LIVING:    OCCUPATION:    Family History:       Problem Relation Age of Onset    Diabetes Mother     High Blood Pressure Mother     Parkinsonism Father     Lung Cancer Brother     Heart Disease Brother        REVIEW OF SYSTEMS:  Unable to be obtained secondary to mental status    PHYSICAL EXAM:    VITALS:  BP (!) 150/99   Pulse 70   Temp 97.2 °F (36.2 °C) (Temporal)   Resp 21   Ht 5' (1.524 m)   Wt 132 lb 4.4 oz (60 kg)   SpO2 97%   BMI 25.83 kg/m²   CONSTITUTIONAL: Awake and in no acute distress  MUSCULOSKELETAL:  Left upper Extremity:   Skin intact circumferentially  +TTP about the proximal humerus. Negative tenderness to palpation about the hand, wrist, forearm, elbow, humerus  Compartments soft and compressible  Patient able to grasp hand   +Radial pulse, Brisk Cap refill, hand warm and perfused  Sensation unable to be fully assessed secondary to mental status    Right upper Extremity:  · Skin intact circumferentially  · No pain with range of motion of the right shoulder  · Compartments soft and compressible  · Patient will grasp hand  · +Radial pulse, Brisk Cap refill, hand warm and perfused  · Incision able unable to be assessed secondary to mental status    Right Lower Extremity:   · Skin is intact circumferentially  · Patient held in a shortened and externally rotated position  · +TTP about the hip  · Negative tenderness to palpation about the foot, ankle, tib-fib, knee, distal femur  · Compartments soft and compressible  · Palpable dorsalis pedis and posterior tibialis pulse, brisk cap refill to toes, foot warm and perfused  · Sensation unable to be properly assessed secondary to mental status  · Patient will wiggle toes    Secondary Exam:     · rightLE: No obvious signs of trauma. -TTP to foot, ankle, leg, knee, thigh, hip.       DATA:    CBC:   Lab Results   Component Value Date    WBC 13.8 10/25/2020    RBC 3.68 10/25/2020    HGB 11.2 10/25/2020    HCT 34.0 10/25/2020    MCV 92.4 10/25/2020    MCH 30.4 10/25/2020    MCHC 32.9 10/25/2020    RDW 14.7 10/25/2020     10/25/2020    MPV 9.6 10/25/2020     PT/INR:    Lab Results   Component Value Date    PROTIME 11.9 10/25/2020    INR 1.1 10/25/2020 Radiology Review:  X-ray left shoulder: 3 part proximal humerus fracture with further medial displacement of the shaft compared to previous films. Humeral shaft fracture that has since healed    X-ray left hip: Comminuted intertrochanteric fracture with shortening, varus angulation, external rotation    X-ray right shoulder: Evidence of prior proximal humerus fracture that is healed    Chest x-ray from October 22: Evidence of left proximal humerus fracture with minimal displacement. Evidence of healed right proximal humerus fracture    IMPRESSION:  · Closed left intertrochanteric hip fracture  · Further displacement of prior left proximal humerus fracture  · History of right proximal humerus fracture and left humeral shaft fracture that have healed  · Osteoporosis  · Chronic Kidney Disease stage 3    PLAN:  Plan for surgery with pending clearance, tomorrow for L hip  NPO after Midnight, Needs medical assessment for surgery  Left upper extremity and left lower extremity non-weight bearing  Sling to left upper extremity  Pre-op Labs and Imaging  Pain control per SICU  Hold Anticoagulation   Treatment consent  Review and discuss with Dr. Isela Le    I have seen and evaluated the patient and agree with the above assessment and plan on today's visit. I have performed the key components of the history and physical examination with significant findings of left proximal humerus fracture 100% displaced and left IT fracture. Will need ORIF when stable for surgery. I concur with the findings and plan as documented.     Jared Lewis MD  10/26/2020      ·
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
## 2020-10-25 NOTE — FLOWSHEET NOTE
Patient is not fully alert and oriented. I was unable to obtain clear answers to the required admission documentation at this time.

## 2020-10-25 NOTE — ED NOTES
2 units of pRBC prior to arrival     Benjaman Patient, [facility]  10/25/20 1220 Oriented - self; Oriented - place; Oriented - time

## 2020-10-26 ENCOUNTER — APPOINTMENT (OUTPATIENT)
Dept: GENERAL RADIOLOGY | Age: 85
DRG: 480 | End: 2020-10-26
Payer: MEDICARE

## 2020-10-26 ENCOUNTER — ANESTHESIA EVENT (OUTPATIENT)
Dept: OPERATING ROOM | Age: 85
DRG: 480 | End: 2020-10-26
Payer: MEDICARE

## 2020-10-26 ENCOUNTER — ANESTHESIA (OUTPATIENT)
Dept: OPERATING ROOM | Age: 85
DRG: 480 | End: 2020-10-26
Payer: MEDICARE

## 2020-10-26 VITALS — OXYGEN SATURATION: 96 % | SYSTOLIC BLOOD PRESSURE: 130 MMHG | DIASTOLIC BLOOD PRESSURE: 65 MMHG | TEMPERATURE: 94.5 F

## 2020-10-26 PROBLEM — S42.212A CLOSED FRACTURE OF SURGICAL NECK OF LEFT HUMERUS: Status: ACTIVE | Noted: 2020-10-26

## 2020-10-26 PROBLEM — S42.002A CLOSED FRACTURE OF LEFT CLAVICLE: Status: ACTIVE | Noted: 2020-10-26
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
## 2020-10-26 PROBLEM — S72.142A CLOSED INTERTROCHANTERIC FRACTURE OF LEFT FEMUR (HCC): Status: ACTIVE | Noted: 2020-10-26

## 2020-10-26 LAB
ALBUMIN SERPL-MCNC: 2.8 G/DL (ref 3.5–5.2)
ALP BLD-CCNC: 151 U/L (ref 35–104)
ALT SERPL-CCNC: 31 U/L (ref 0–32)
ANION GAP SERPL CALCULATED.3IONS-SCNC: 14 MMOL/L (ref 7–16)
AST SERPL-CCNC: 51 U/L (ref 0–31)
BASOPHILS ABSOLUTE: 0.01 E9/L (ref 0–0.2)
BASOPHILS RELATIVE PERCENT: 0.1 % (ref 0–2)
BILIRUB SERPL-MCNC: 0.4 MG/DL (ref 0–1.2)
BUN BLDV-MCNC: 51 MG/DL (ref 8–23)
BURR CELLS: ABNORMAL
CALCIUM IONIZED: 1.2 MMOL/L (ref 1.15–1.33)
CALCIUM SERPL-MCNC: 8.6 MG/DL (ref 8.6–10.2)
CHLORIDE BLD-SCNC: 113 MMOL/L (ref 98–107)
CO2: 19 MMOL/L (ref 22–29)
CREAT SERPL-MCNC: 2.1 MG/DL (ref 0.5–1)
EOSINOPHILS ABSOLUTE: 0.01 E9/L (ref 0.05–0.5)
EOSINOPHILS RELATIVE PERCENT: 0.1 % (ref 0–6)
GFR AFRICAN AMERICAN: 27
GFR NON-AFRICAN AMERICAN: 22 ML/MIN/1.73
GLUCOSE BLD-MCNC: 96 MG/DL (ref 74–99)
HCT VFR BLD CALC: 25.7 % (ref 34–48)
HEMOGLOBIN: 8.7 G/DL (ref 11.5–15.5)
HYPOCHROMIA: ABNORMAL
IMMATURE GRANULOCYTES #: 0.09 E9/L
IMMATURE GRANULOCYTES %: 0.8 % (ref 0–5)
LYMPHOCYTES ABSOLUTE: 1.02 E9/L (ref 1.5–4)
LYMPHOCYTES RELATIVE PERCENT: 8.5 % (ref 20–42)
MAGNESIUM: 1.8 MG/DL (ref 1.6–2.6)
MCH RBC QN AUTO: 31 PG (ref 26–35)
MCHC RBC AUTO-ENTMCNC: 33.9 % (ref 32–34.5)
MCV RBC AUTO: 91.5 FL (ref 80–99.9)
MONOCYTES ABSOLUTE: 1.6 E9/L (ref 0.1–0.95)
MONOCYTES RELATIVE PERCENT: 13.4 % (ref 2–12)
MRSA CULTURE ONLY: NORMAL
NEUTROPHILS ABSOLUTE: 9.2 E9/L (ref 1.8–7.3)
NEUTROPHILS RELATIVE PERCENT: 77.1 % (ref 43–80)
PDW BLD-RTO: 15.2 FL (ref 11.5–15)
PHOSPHORUS: 4.1 MG/DL (ref 2.5–4.5)
PLATELET # BLD: 130 E9/L (ref 130–450)
PMV BLD AUTO: 9.7 FL (ref 7–12)
POIKILOCYTES: ABNORMAL
POLYCHROMASIA: ABNORMAL
POTASSIUM REFLEX MAGNESIUM: 4.4 MMOL/L (ref 3.5–5)
RBC # BLD: 2.81 E12/L (ref 3.5–5.5)
SODIUM BLD-SCNC: 146 MMOL/L (ref 132–146)
TOTAL PROTEIN: 5.5 G/DL (ref 6.4–8.3)
WBC # BLD: 11.9 E9/L (ref 4.5–11.5)

## 2020-10-26 PROCEDURE — 2500000003 HC RX 250 WO HCPCS

## 2020-10-26 PROCEDURE — 23600 CLTX PROX HUMRL FX W/O MNPJ: CPT | Performed by: ORTHOPAEDIC SURGERY

## 2020-10-26 PROCEDURE — 83735 ASSAY OF MAGNESIUM: CPT

## 2020-10-26 PROCEDURE — 93005 ELECTROCARDIOGRAM TRACING: CPT | Performed by: STUDENT IN AN ORGANIZED HEALTH CARE EDUCATION/TRAINING PROGRAM

## 2020-10-26 PROCEDURE — 3209999900 FLUORO FOR SURGICAL PROCEDURES

## 2020-10-26 PROCEDURE — 3700000000 HC ANESTHESIA ATTENDED CARE: Performed by: ORTHOPAEDIC SURGERY

## 2020-10-26 PROCEDURE — 2000000000 HC ICU R&B

## 2020-10-26 PROCEDURE — 73502 X-RAY EXAM HIP UNI 2-3 VIEWS: CPT

## 2020-10-26 PROCEDURE — 7100000000 HC PACU RECOVERY - FIRST 15 MIN

## 2020-10-26 PROCEDURE — 99221 1ST HOSP IP/OBS SF/LOW 40: CPT | Performed by: ORTHOPAEDIC SURGERY

## 2020-10-26 PROCEDURE — 84100 ASSAY OF PHOSPHORUS: CPT

## 2020-10-26 PROCEDURE — 85025 COMPLETE CBC W/AUTO DIFF WBC: CPT

## 2020-10-26 PROCEDURE — 2720000010 HC SURG SUPPLY STERILE: Performed by: ORTHOPAEDIC SURGERY

## 2020-10-26 PROCEDURE — C1769 GUIDE WIRE: HCPCS | Performed by: ORTHOPAEDIC SURGERY

## 2020-10-26 PROCEDURE — 2500000003 HC RX 250 WO HCPCS: Performed by: NURSE ANESTHETIST, CERTIFIED REGISTERED

## 2020-10-26 PROCEDURE — 73552 X-RAY EXAM OF FEMUR 2/>: CPT

## 2020-10-26 PROCEDURE — 27245 TREAT THIGH FRACTURE: CPT | Performed by: ORTHOPAEDIC SURGERY

## 2020-10-26 PROCEDURE — 6370000000 HC RX 637 (ALT 250 FOR IP): Performed by: STUDENT IN AN ORGANIZED HEALTH CARE EDUCATION/TRAINING PROGRAM

## 2020-10-26 PROCEDURE — 2580000003 HC RX 258: Performed by: STUDENT IN AN ORGANIZED HEALTH CARE EDUCATION/TRAINING PROGRAM

## 2020-10-26 PROCEDURE — 6370000000 HC RX 637 (ALT 250 FOR IP): Performed by: SURGERY

## 2020-10-26 PROCEDURE — 7100000001 HC PACU RECOVERY - ADDTL 15 MIN

## 2020-10-26 PROCEDURE — 2700000000 HC OXYGEN THERAPY PER DAY

## 2020-10-26 PROCEDURE — 99291 CRITICAL CARE FIRST HOUR: CPT | Performed by: SURGERY

## 2020-10-26 PROCEDURE — 6360000002 HC RX W HCPCS

## 2020-10-26 PROCEDURE — 0QS704Z REPOSITION LEFT UPPER FEMUR WITH INTERNAL FIXATION DEVICE, OPEN APPROACH: ICD-10-PCS | Performed by: SURGERY

## 2020-10-26 PROCEDURE — 99221 1ST HOSP IP/OBS SF/LOW 40: CPT | Performed by: FAMILY MEDICINE

## 2020-10-26 PROCEDURE — 6360000002 HC RX W HCPCS: Performed by: STUDENT IN AN ORGANIZED HEALTH CARE EDUCATION/TRAINING PROGRAM

## 2020-10-26 PROCEDURE — 2580000003 HC RX 258

## 2020-10-26 PROCEDURE — 82330 ASSAY OF CALCIUM: CPT

## 2020-10-26 PROCEDURE — 6360000002 HC RX W HCPCS: Performed by: NURSE ANESTHETIST, CERTIFIED REGISTERED

## 2020-10-26 PROCEDURE — 3600000015 HC SURGERY LEVEL 5 ADDTL 15MIN: Performed by: ORTHOPAEDIC SURGERY

## 2020-10-26 PROCEDURE — C1776 JOINT DEVICE (IMPLANTABLE): HCPCS | Performed by: ORTHOPAEDIC SURGERY

## 2020-10-26 PROCEDURE — 2709999900 HC NON-CHARGEABLE SUPPLY: Performed by: ORTHOPAEDIC SURGERY

## 2020-10-26 PROCEDURE — 80053 COMPREHEN METABOLIC PANEL: CPT

## 2020-10-26 PROCEDURE — 3700000001 HC ADD 15 MINUTES (ANESTHESIA): Performed by: ORTHOPAEDIC SURGERY

## 2020-10-26 PROCEDURE — C1713 ANCHOR/SCREW BN/BN,TIS/BN: HCPCS | Performed by: ORTHOPAEDIC SURGERY

## 2020-10-26 PROCEDURE — 3600000005 HC SURGERY LEVEL 5 BASE: Performed by: ORTHOPAEDIC SURGERY

## 2020-10-26 DEVICE — SCREW LK TI 5.0MM X 32MM: Type: IMPLANTABLE DEVICE | Site: HIP | Status: FUNCTIONAL

## 2020-10-26 DEVICE — NAIL IM L340MM DIA11MM 130DEG LNG L PROX FEM GRN TI CANN: Type: IMPLANTABLE DEVICE | Site: HIP | Status: FUNCTIONAL

## 2020-10-26 DEVICE — SCREW BNE L85MM DIA10.35MM G TI CANN PERF FOR PROX FEM: Type: IMPLANTABLE DEVICE | Site: HIP | Status: FUNCTIONAL

## 2020-10-26 DEVICE — CEMENT BNE INJ: Type: IMPLANTABLE DEVICE | Site: HIP | Status: FUNCTIONAL

## 2020-10-26 DEVICE — SCREW BNE L36MM DIA5MM TIB LT GRN TI ST CANN LOK FULL THRD: Type: IMPLANTABLE DEVICE | Site: HIP | Status: FUNCTIONAL

## 2020-10-26 RX ORDER — MORPHINE SULFATE 2 MG/ML
1 INJECTION, SOLUTION INTRAMUSCULAR; INTRAVENOUS EVERY 5 MIN PRN
Status: DISCONTINUED | OUTPATIENT
Start: 2020-10-26 | End: 2020-10-26

## 2020-10-26 RX ORDER — ACETAMINOPHEN 650 MG/1
650 SUPPOSITORY RECTAL EVERY 6 HOURS
Status: DISCONTINUED | OUTPATIENT
Start: 2020-10-26 | End: 2020-10-27

## 2020-10-26 RX ORDER — ONDANSETRON 2 MG/ML
INJECTION INTRAMUSCULAR; INTRAVENOUS PRN
Status: DISCONTINUED | OUTPATIENT
Start: 2020-10-26 | End: 2020-10-26 | Stop reason: SDUPTHER

## 2020-10-26 RX ORDER — GLYCOPYRROLATE 1 MG/5 ML
SYRINGE (ML) INTRAVENOUS PRN
Status: DISCONTINUED | OUTPATIENT
Start: 2020-10-26 | End: 2020-10-26 | Stop reason: SDUPTHER

## 2020-10-26 RX ORDER — LIDOCAINE HYDROCHLORIDE 20 MG/ML
INJECTION, SOLUTION INTRAVENOUS PRN
Status: DISCONTINUED | OUTPATIENT
Start: 2020-10-26 | End: 2020-10-26 | Stop reason: SDUPTHER

## 2020-10-26 RX ORDER — OXYCODONE HYDROCHLORIDE AND ACETAMINOPHEN 5; 325 MG/1; MG/1
1 TABLET ORAL EVERY 6 HOURS PRN
Qty: 28 TABLET | Refills: 0 | Status: SHIPPED | OUTPATIENT
Start: 2020-10-26 | End: 2020-11-02

## 2020-10-26 RX ORDER — PROPOFOL 10 MG/ML
INJECTION, EMULSION INTRAVENOUS PRN
Status: DISCONTINUED | OUTPATIENT
Start: 2020-10-26 | End: 2020-10-26 | Stop reason: SDUPTHER

## 2020-10-26 RX ORDER — ONDANSETRON 2 MG/ML
4 INJECTION INTRAMUSCULAR; INTRAVENOUS
Status: DISCONTINUED | OUTPATIENT
Start: 2020-10-26 | End: 2020-10-26

## 2020-10-26 RX ORDER — MORPHINE SULFATE 2 MG/ML
2 INJECTION, SOLUTION INTRAMUSCULAR; INTRAVENOUS EVERY 5 MIN PRN
Status: DISCONTINUED | OUTPATIENT
Start: 2020-10-26 | End: 2020-10-26

## 2020-10-26 RX ORDER — MEPERIDINE HYDROCHLORIDE 25 MG/ML
12.5 INJECTION INTRAMUSCULAR; INTRAVENOUS; SUBCUTANEOUS EVERY 5 MIN PRN
Status: DISCONTINUED | OUTPATIENT
Start: 2020-10-26 | End: 2020-10-26

## 2020-10-26 RX ORDER — ASPIRIN 325 MG
325 TABLET, DELAYED RELEASE (ENTERIC COATED) ORAL 2 TIMES DAILY
Qty: 56 TABLET | Refills: 0 | Status: SHIPPED | OUTPATIENT
Start: 2020-10-26

## 2020-10-26 RX ORDER — SODIUM CHLORIDE, SODIUM LACTATE, POTASSIUM CHLORIDE, CALCIUM CHLORIDE 600; 310; 30; 20 MG/100ML; MG/100ML; MG/100ML; MG/100ML
INJECTION, SOLUTION INTRAVENOUS CONTINUOUS PRN
Status: DISCONTINUED | OUTPATIENT
Start: 2020-10-26 | End: 2020-10-26 | Stop reason: SDUPTHER

## 2020-10-26 RX ORDER — ROCURONIUM BROMIDE 10 MG/ML
INJECTION, SOLUTION INTRAVENOUS PRN
Status: DISCONTINUED | OUTPATIENT
Start: 2020-10-26 | End: 2020-10-26 | Stop reason: SDUPTHER

## 2020-10-26 RX ORDER — MORPHINE SULFATE 2 MG/ML
2 INJECTION, SOLUTION INTRAMUSCULAR; INTRAVENOUS
Status: DISCONTINUED | OUTPATIENT
Start: 2020-10-26 | End: 2020-10-27

## 2020-10-26 RX ORDER — DEXAMETHASONE SODIUM PHOSPHATE 10 MG/ML
INJECTION, SOLUTION INTRAMUSCULAR; INTRAVENOUS PRN
Status: DISCONTINUED | OUTPATIENT
Start: 2020-10-26 | End: 2020-10-26 | Stop reason: SDUPTHER

## 2020-10-26 RX ORDER — PROMETHAZINE HYDROCHLORIDE 25 MG/ML
6.25 INJECTION, SOLUTION INTRAMUSCULAR; INTRAVENOUS
Status: DISCONTINUED | OUTPATIENT
Start: 2020-10-26 | End: 2020-10-26

## 2020-10-26 RX ORDER — SODIUM CHLORIDE 9 MG/ML
INJECTION, SOLUTION INTRAVENOUS CONTINUOUS PRN
Status: DISCONTINUED | OUTPATIENT
Start: 2020-10-26 | End: 2020-10-26

## 2020-10-26 RX ORDER — FENTANYL CITRATE 50 UG/ML
INJECTION, SOLUTION INTRAMUSCULAR; INTRAVENOUS PRN
Status: DISCONTINUED | OUTPATIENT
Start: 2020-10-26 | End: 2020-10-26 | Stop reason: SDUPTHER

## 2020-10-26 RX ORDER — NEOSTIGMINE METHYLSULFATE 1 MG/ML
INJECTION, SOLUTION INTRAVENOUS PRN
Status: DISCONTINUED | OUTPATIENT
Start: 2020-10-26 | End: 2020-10-26 | Stop reason: SDUPTHER

## 2020-10-26 RX ORDER — HEPARIN SODIUM 10000 [USP'U]/ML
5000 INJECTION, SOLUTION INTRAVENOUS; SUBCUTANEOUS EVERY 8 HOURS SCHEDULED
Status: DISCONTINUED | OUTPATIENT
Start: 2020-10-26 | End: 2020-11-02 | Stop reason: HOSPADM

## 2020-10-26 RX ORDER — ACETAMINOPHEN 650 MG/1
650 SUPPOSITORY RECTAL EVERY 4 HOURS PRN
Status: DISCONTINUED | OUTPATIENT
Start: 2020-10-26 | End: 2020-10-26

## 2020-10-26 RX ORDER — MORPHINE SULFATE 4 MG/ML
4 INJECTION, SOLUTION INTRAMUSCULAR; INTRAVENOUS
Status: DISCONTINUED | OUTPATIENT
Start: 2020-10-26 | End: 2020-10-27

## 2020-10-26 RX ORDER — DULOXETIN HYDROCHLORIDE 30 MG/1
30 CAPSULE, DELAYED RELEASE ORAL DAILY
Status: DISCONTINUED | OUTPATIENT
Start: 2020-10-26 | End: 2020-11-02 | Stop reason: HOSPADM

## 2020-10-26 RX ORDER — OXYCODONE HYDROCHLORIDE 5 MG/1
10 TABLET ORAL EVERY 4 HOURS PRN
Status: DISCONTINUED | OUTPATIENT
Start: 2020-10-26 | End: 2020-10-27

## 2020-10-26 RX ORDER — SODIUM CHLORIDE 0.9 % (FLUSH) 0.9 %
10 SYRINGE (ML) INJECTION EVERY 12 HOURS SCHEDULED
Status: DISCONTINUED | OUTPATIENT
Start: 2020-10-26 | End: 2020-11-02 | Stop reason: HOSPADM

## 2020-10-26 RX ORDER — OXYCODONE HYDROCHLORIDE 5 MG/1
5 TABLET ORAL EVERY 4 HOURS PRN
Status: DISCONTINUED | OUTPATIENT
Start: 2020-10-26 | End: 2020-10-27

## 2020-10-26 RX ORDER — SODIUM CHLORIDE 0.9 % (FLUSH) 0.9 %
10 SYRINGE (ML) INJECTION PRN
Status: DISCONTINUED | OUTPATIENT
Start: 2020-10-26 | End: 2020-11-02 | Stop reason: HOSPADM

## 2020-10-26 RX ADMIN — SODIUM CHLORIDE, POTASSIUM CHLORIDE, SODIUM LACTATE AND CALCIUM CHLORIDE: 600; 310; 30; 20 INJECTION, SOLUTION INTRAVENOUS at 14:17

## 2020-10-26 RX ADMIN — FENTANYL CITRATE 50 MCG: 50 INJECTION, SOLUTION INTRAMUSCULAR; INTRAVENOUS at 14:56

## 2020-10-26 RX ADMIN — FENTANYL CITRATE 25 MCG: 50 INJECTION, SOLUTION INTRAMUSCULAR; INTRAVENOUS at 00:42

## 2020-10-26 RX ADMIN — Medication 2 G: at 14:41

## 2020-10-26 RX ADMIN — LIDOCAINE HYDROCHLORIDE 60 MG: 20 INJECTION, SOLUTION INTRAVENOUS at 14:23

## 2020-10-26 RX ADMIN — ROCURONIUM BROMIDE 10 MG: 10 INJECTION, SOLUTION INTRAVENOUS at 15:07

## 2020-10-26 RX ADMIN — PROPOFOL 100 MG: 10 INJECTION, EMULSION INTRAVENOUS at 14:23

## 2020-10-26 RX ADMIN — SODIUM CHLORIDE, POTASSIUM CHLORIDE, SODIUM LACTATE AND CALCIUM CHLORIDE: 600; 310; 30; 20 INJECTION, SOLUTION INTRAVENOUS at 01:35

## 2020-10-26 RX ADMIN — ACETAMINOPHEN 650 MG: 650 SUPPOSITORY RECTAL at 18:14

## 2020-10-26 RX ADMIN — ACETAMINOPHEN 650 MG: 650 SUPPOSITORY RECTAL at 12:34

## 2020-10-26 RX ADMIN — METHOCARBAMOL 500 MG: 100 INJECTION INTRAMUSCULAR; INTRAVENOUS at 05:01

## 2020-10-26 RX ADMIN — Medication 10 ML: at 20:04

## 2020-10-26 RX ADMIN — ROCURONIUM BROMIDE 10 MG: 10 INJECTION, SOLUTION INTRAVENOUS at 14:32

## 2020-10-26 RX ADMIN — HEPARIN SODIUM 5000 UNITS: 10000 INJECTION INTRAVENOUS; SUBCUTANEOUS at 20:06

## 2020-10-26 RX ADMIN — Medication 10 ML: at 09:00

## 2020-10-26 RX ADMIN — ROCURONIUM BROMIDE 30 MG: 10 INJECTION, SOLUTION INTRAVENOUS at 14:23

## 2020-10-26 RX ADMIN — CEFTRIAXONE SODIUM 1 G: 1 INJECTION, POWDER, FOR SOLUTION INTRAMUSCULAR; INTRAVENOUS at 13:35

## 2020-10-26 RX ADMIN — SODIUM CHLORIDE, POTASSIUM CHLORIDE, SODIUM LACTATE AND CALCIUM CHLORIDE: 600; 310; 30; 20 INJECTION, SOLUTION INTRAVENOUS at 18:06

## 2020-10-26 RX ADMIN — Medication 0.6 MG: at 15:55

## 2020-10-26 RX ADMIN — ONDANSETRON HYDROCHLORIDE 4 MG: 2 INJECTION, SOLUTION INTRAMUSCULAR; INTRAVENOUS at 15:45

## 2020-10-26 RX ADMIN — Medication 3 MG: at 15:55

## 2020-10-26 RX ADMIN — FENTANYL CITRATE 25 MCG: 50 INJECTION, SOLUTION INTRAMUSCULAR; INTRAVENOUS at 15:59

## 2020-10-26 RX ADMIN — DEXAMETHASONE SODIUM PHOSPHATE 10 MG: 10 INJECTION, SOLUTION INTRAMUSCULAR; INTRAVENOUS at 14:32

## 2020-10-26 RX ADMIN — FENTANYL CITRATE 100 MCG: 50 INJECTION, SOLUTION INTRAMUSCULAR; INTRAVENOUS at 14:23

## 2020-10-26 ASSESSMENT — PAIN SCALES - PAIN ASSESSMENT IN ADVANCED DEMENTIA (PAINAD)
TOTALSCORE: 2
CONSOLABILITY: 1
CONSOLABILITY: 0
TOTALSCORE: 0
BREATHING: 1
NEGVOCALIZATION: 0
TOTALSCORE: 4
CONSOLABILITY: 0
TOTALSCORE: 0
TOTALSCORE: 6
NEGVOCALIZATION: 2
TOTALSCORE: 6
FACIALEXPRESSION: 0
NEGVOCALIZATION: 1
BODYLANGUAGE: 0
FACIALEXPRESSION: 1
CONSOLABILITY: 1
TOTALSCORE: 0
BODYLANGUAGE: 0
NEGVOCALIZATION: 0
BREATHING: 0
BREATHING: 1
NEGVOCALIZATION: 2
BODYLANGUAGE: 0
BODYLANGUAGE: 1
CONSOLABILITY: 0
BREATHING: 1
NEGVOCALIZATION: 2
NEGVOCALIZATION: 0
BODYLANGUAGE: 1
BODYLANGUAGE: 0
BODYLANGUAGE: 1
FACIALEXPRESSION: 0
FACIALEXPRESSION: 1
NEGVOCALIZATION: 0
BREATHING: 0
CONSOLABILITY: 0
NEGVOCALIZATION: 2
BODYLANGUAGE: 1
FACIALEXPRESSION: 0
FACIALEXPRESSION: 1
BREATHING: 1
CONSOLABILITY: 1
BODYLANGUAGE: 0
NEGVOCALIZATION: 1
FACIALEXPRESSION: 1
CONSOLABILITY: 0
TOTALSCORE: 6
FACIALEXPRESSION: 0
BREATHING: 2
BREATHING: 0
TOTALSCORE: 6
BREATHING: 0
CONSOLABILITY: 1
FACIALEXPRESSION: 1
BODYLANGUAGE: 0
FACIALEXPRESSION: 1
TOTALSCORE: 0
BREATHING: 0
CONSOLABILITY: 0

## 2020-10-26 ASSESSMENT — PULMONARY FUNCTION TESTS
PIF_VALUE: 25
PIF_VALUE: 1
PIF_VALUE: 25
PIF_VALUE: 43
PIF_VALUE: 1
PIF_VALUE: 25
PIF_VALUE: 28
PIF_VALUE: 25
PIF_VALUE: 2
PIF_VALUE: 26
PIF_VALUE: 28
PIF_VALUE: 27
PIF_VALUE: 26
PIF_VALUE: 24
PIF_VALUE: 26
PIF_VALUE: 2
PIF_VALUE: 0
PIF_VALUE: 25
PIF_VALUE: 28
PIF_VALUE: 25
PIF_VALUE: 25
PIF_VALUE: 22
PIF_VALUE: 25
PIF_VALUE: 0
PIF_VALUE: 9
PIF_VALUE: 0
PIF_VALUE: 25
PIF_VALUE: 22
PIF_VALUE: 3
PIF_VALUE: 25
PIF_VALUE: 25
PIF_VALUE: 29
PIF_VALUE: 25
PIF_VALUE: 14
PIF_VALUE: 25
PIF_VALUE: 29
PIF_VALUE: 9
PIF_VALUE: 26
PIF_VALUE: 26
PIF_VALUE: 25
PIF_VALUE: 26
PIF_VALUE: 30
PIF_VALUE: 26
PIF_VALUE: 27
PIF_VALUE: 29
PIF_VALUE: 25
PIF_VALUE: 26
PIF_VALUE: 3
PIF_VALUE: 2
PIF_VALUE: 1
PIF_VALUE: 27
PIF_VALUE: 26
PIF_VALUE: 25
PIF_VALUE: 25
PIF_VALUE: 1
PIF_VALUE: 25
PIF_VALUE: 2
PIF_VALUE: 1
PIF_VALUE: 0
PIF_VALUE: 1
PIF_VALUE: 14
PIF_VALUE: 26
PIF_VALUE: 25
PIF_VALUE: 23
PIF_VALUE: 2
PIF_VALUE: 25
PIF_VALUE: 2
PIF_VALUE: 26
PIF_VALUE: 12
PIF_VALUE: 22
PIF_VALUE: 25
PIF_VALUE: 1
PIF_VALUE: 2
PIF_VALUE: 1
PIF_VALUE: 22
PIF_VALUE: 1
PIF_VALUE: 2
PIF_VALUE: 14
PIF_VALUE: 0
PIF_VALUE: 27
PIF_VALUE: 25
PIF_VALUE: 14
PIF_VALUE: 25
PIF_VALUE: 14
PIF_VALUE: 25
PIF_VALUE: 14
PIF_VALUE: 21
PIF_VALUE: 25
PIF_VALUE: 25
PIF_VALUE: 12
PIF_VALUE: 27
PIF_VALUE: 25
PIF_VALUE: 29
PIF_VALUE: 25
PIF_VALUE: 14
PIF_VALUE: 2
PIF_VALUE: 27
PIF_VALUE: 14
PIF_VALUE: 14
PIF_VALUE: 9
PIF_VALUE: 25
PIF_VALUE: 25
PIF_VALUE: 26
PIF_VALUE: 25
PIF_VALUE: 26
PIF_VALUE: 26
PIF_VALUE: 3
PIF_VALUE: 25
PIF_VALUE: 23

## 2020-10-26 ASSESSMENT — PAIN SCALES - GENERAL
PAINLEVEL_OUTOF10: 0
PAINLEVEL_OUTOF10: 6
PAINLEVEL_OUTOF10: 2
PAINLEVEL_OUTOF10: 0
PAINLEVEL_OUTOF10: 0
PAINLEVEL_OUTOF10: 6
PAINLEVEL_OUTOF10: 6
PAINLEVEL_OUTOF10: 0
PAINLEVEL_OUTOF10: 4
PAINLEVEL_OUTOF10: 0
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
## 2020-10-26 NOTE — ANESTHESIA PRE PROCEDURE
Department of Anesthesiology  Preprocedure Note       Name:  Nabor Lopez   Age:  81 y.o.  :  1935                                          MRN:  [id number]         Date:  10/26/2020      Surgeon: Celestina Boss):  Vipin Xavier DO    Procedure: Procedure(s):  HIP OPEN REDUCTION INTERNAL FIXATION    Medications prior to admission:   Prior to Admission medications    Medication Sig Start Date End Date Taking?  Authorizing Provider   Protein (PROSOURCE PO) Take 30 mLs by mouth 2 times daily    Historical Provider, MD   DULoxetine (CYMBALTA) 30 MG extended release capsule Take 30 mg by mouth daily    Historical Provider, MD   bisacodyl (DULCOLAX) 10 MG suppository Place 10 mg rectally daily as needed for Constipation    Historical Provider, MD   amLODIPine (NORVASC) 5 MG tablet Take 5 mg by mouth daily    Historical Provider, MD HERNANDEZ Complex-C-E-Zn (STRESS PLUS ZINC) TABS Take 1 tablet by mouth daily    Historical Provider, MD   acetaminophen (TYLENOL) 325 MG tablet Take 1,000 mg by mouth 3 times daily     Historical Provider, MD   magnesium hydroxide (MILK OF MAGNESIA) 400 MG/5ML suspension Take by mouth daily as needed for Constipation    Historical Provider, MD   hydrochlorothiazide (HYDRODIURIL) 12.5 MG tablet Take 1 tablet by mouth daily  Patient taking differently: Take 25 mg by mouth daily  19   Sathya Zheng MD   labetalol (NORMODYNE) 100 MG tablet Take 100 mg by mouth 2 times daily    Historical Provider, MD       Current medications:    Current Facility-Administered Medications   Medication Dose Route Frequency Provider Last Rate Last Dose    ceFAZolin (ANCEF) 2 g in sterile water 20 mL IV syringe  2 g Intravenous See Admin Instructions David Tamez DO        DULoxetine (CYMBALTA) extended release capsule 30 mg  30 mg Oral Daily Amadeo Cunha MD        acetaminophen (TYLENOL) suppository 650 mg  650 mg Rectal Q6H Amadeo Cunha MD   650 mg at 10/26/20 1234    sodium chloride flush 0.9 % injection 10 mL  10 mL Intravenous 2 times per day Yvan Fortune MD   10 mL at 10/26/20 0900    sodium chloride flush 0.9 % injection 10 mL  10 mL Intravenous PRN Yvan Fortune MD        acetaminophen (TYLENOL) tablet 650 mg  650 mg Oral Q4H PRN Yvan Fortune MD        lactated ringers infusion   Intravenous Continuous Yvan Fortune MD 75 mL/hr at 10/26/20 0135      oxyCODONE (ROXICODONE) immediate release tablet 5 mg  5 mg Oral Q4H PRN Yvan Fortune MD        fentaNYL (SUBLIMAZE) injection 25 mcg  25 mcg Intravenous Q1H PRN Yvan Fortune MD   25 mcg at 10/26/20 0042    magnesium hydroxide (MILK OF MAGNESIA) 400 MG/5ML suspension 30 mL  30 mL Oral Daily PRN Yvan Fortune MD        polyethylene glycol (GLYCOLAX) packet 17 g  17 g Oral Daily Yvan Fortune MD        sennosides-docusate sodium (SENOKOT-S) 8.6-50 MG tablet 1 tablet  1 tablet Oral BID Yvan Fortune MD        ondansetron [facility]) injection 4 mg  4 mg Intravenous Q6H PRN Yvan Fortune MD        cefTRIAXone (ROCEPHIN) 1 g in sterile water 10 mL IV syringe  1 g Intravenous Q24H Yvan Fortune MD   1 g at 10/25/20 1522    [Held by provider] labetalol (Selestine Gens) tablet 100 mg  100 mg Oral BID Kaur Arshad MD           Allergies:  No Known Allergies    Problem List:    Patient Active Problem List   Diagnosis Code    Chronic lower back pain M54.5, G89.29    Bulging lumbar disc M51.26    Essential hypertension I10    Hyperlipidemia E78.5    CKD (chronic kidney disease) N18.9    RLS (restless legs syndrome) G25.81    Lumbar foraminal stenosis M48.061    Lumbar facet arthropathy  M47.816    Lumbosacral spondylosis M47.817    Chronic pain G89.29    Lumbar disc herniation M51.26    Acute blood loss anemia D62    BABS (acute kidney injury) ([facility] [address] N17.9    Altered mental status R41.82    Acute hyperactive delirium due to multiple etiologies F05    Non-traumatic compression fracture of eleventh thoracic vertebra ([address] M48.54XA    Adult idiopathic generalized osteoporosis M81.8    Closed displaced simple supracondylar fracture of left humerus without intercondylar fracture S42.412A    Acute renal failure superimposed on stage 3 chronic kidney disease (HCC) N17.9, N18.30    Closed displaced spiral fracture of shaft of left humerus with malunion S42.342P    Puncture wound of left upper arm with complication Y76.600E    Trauma T14.90XA    Fall from standing W19. [place] Rung    Closed intertrochanteric fracture of hip, left, initial encounter [facility]) S72.142A    Closed fracture of surgical neck of left humerus S42.212A    Closed fracture of left clavicle S42.002A       Past Medical History:        Diagnosis Date    Anxiety     Arthritis     Back pain     CKD (chronic kidney disease)     stage 3    Depression     Fracture of left humerus     Hx of falling     Hyperlipidemia     Hypertension     Osteoporosis     Weakness        Past Surgical History:        Procedure Laterality Date    ARM SURGERY Left 8/4/2020    PARTIAL OSTECTOMY LEFT HUMERUS (CPT 52529) performed by Clementina Nance DO at [facility] [place], DIAGNOSTIC      EYE SURGERY      carmelo lense implants    HYSTERECTOMY      JOINT REPLACEMENT      NERVE BLOCK Left 03/28/2017    leftlumbar transforaminal NB #1     NERVE BLOCK Left 04/04/2017    Left lumbar transforaminal nerve block #2 L4-5, L5-S1    NERVE BLOCK Left 06/13/2017    transforaminal nerve block, lumbar #3    NERVE BLOCK Left 08/15/2017    paravertebral facet left lumbar #1    OTHER SURGICAL HISTORY  02/10/2017    CT guided lumbar spine bone biopsy    PARATHYROID GLAND SURGERY      LA LUMBAR SPINE FUSN,POST INTRBDY N/A 9/13/2018    L3-S1 FUSION, L4-L5, L5-S1  TRANSFORAMINAL LUMBAR INTERBODY FUSION -- NEEDS CAGES, PLATES, SCREWS, O-ARM, AUDIOLOGY, CHIARA TABLE, CELL SAVER, PLATELET GEL - [employer] performed by

## 2020-10-26 NOTE — PLAN OF CARE
Problem: Pain:  Goal: Pain level will decrease  Description: Pain level will decrease  Outcome: Met This Shift  Goal: Control of acute pain  Description: Control of acute pain  10/25/2020 2018 by Regan Juares RN  Outcome: Met This Shift  10/25/2020 1313 by Teddy Chowdhury RN  Outcome: Met This Shift     Problem: Skin Integrity:  Goal: Absence of new skin breakdown  Description: Absence of new skin breakdown  10/25/2020 1313 by Teddy Chowdhury RN  Outcome: Met This Shift     Problem: Falls - Risk of:  Goal: Will remain free from falls  Description: Will remain free from falls  10/25/2020 2018 by Regan Juares RN  Outcome: Met This Shift  10/25/2020 1313 by Teddy Chowdhury RN  Outcome: Met This Shift  Goal: Absence of physical injury  Description: Absence of physical injury  10/25/2020 2018 by Regan Juares RN  Outcome: Met This Shift  10/25/2020 1313 by Teddy Chowdhury RN  Outcome: Met This Shift
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
## 2020-10-26 NOTE — BRIEF OP NOTE
Brief Postoperative Note      Patient: Radha August  YOB: 1935  MRN: [id number]    Date of Procedure: 10/26/2020    Pre-Op Diagnosis: LEFT HIP FRACTURE    Post-Op Diagnosis: Same       Procedure(s):  HIP OPEN REDUCTION INTERNAL FIXATION    Surgeon(s):  Amado Degroot DO    Assistant:  Resident: Sarah Elliott DO    Anesthesia: General    Estimated Blood Loss (mL): less than 828     Complications: None    Specimens:   * No specimens in log *    Implants:  Implant Name Type Inv.  Item Serial No.  Lot No. LRB No. Used Action   CEMENT BONE INJECTABLE Cement CEMENT BONE INJECTABLE  SYNTHASOME [id number] Left 1 Implanted   NAIL 11MM 130 DEG TI FELIX TFNA 340MM LT ST Screw/Plate/Nail/Luis NAIL 75MU 130 DEG TI FELIX TFNA 340MM LT ST  SYNTHES [id number] Left 1 Implanted   SCREW 10.5MM TI TFNA FEN 85MM Shoulder/Arm/Wrist/Hand SCREW 10.5MM TI TFNA FEN 85MM  SYNTHES [id number] Left 1 Implanted   SCREW LK TI 5.0MM X 32MM Screw/Plate/Nail/Luis SCREW LK TI 5.0MM X 32MM  SYNTHES [id number] Left 1 Implanted   SCREW LK W/ T25 STARDRIVE [id number] Screw/Plate/Nail/Luis SCREW LK W/ T25 STARDRIVE [id number]  SYNTHES [id number] Left 1 Implanted         Drains:   Urethral Catheter Temperature probe 16 fr (Active)   Catheter Indications Need for fluid management in critically ill patients in a critical care setting not able to be managed by other means such as BSC with hat, bedpan, urinal, condom catheter, or short term intermittent urethral catherization 10/26/20 1200   Securement Device Date Changed 10/25/20 10/25/20 1250   Site Assessment No urethral drainage 10/26/20 1200   Urine Color Yellow 10/26/20 1200   Urine Appearance Hazy 10/26/20 1200   Output (mL) 55 mL 10/26/20 1400       Findings: see op note    Electronically signed by Damir Candelaria DO on 10/26/2020 at 4:02 PM
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
## 2020-10-26 NOTE — PROGRESS NOTES
Physical Therapy  Physical Therapy Attempt    Name: Christopher Courtney  : 1935  MRN: [id number]      Date of Service: 10/26/2020  Chart reviewed. Pt scheduled for OR today with ortho. Will re-attempt as able.     Jennifer Smith, PT, DPT  [id number]

## 2020-10-26 NOTE — BRIEF OP NOTE
Brief Postoperative Note      Patient: Pratik Yeager  YOB: 1935  MRN: [id number]    Date of Procedure: 10/26/2020    Pre-Op Diagnosis: LEFT HIP FRACTURE    Post-Op Diagnosis: Same       Procedure(s):  HIP OPEN REDUCTION INTERNAL FIXATION    Surgeon(s):  Matt Oneal DO    Assistant:  Resident: Judy Salas DO    Anesthesia: General    Estimated Blood Loss (mL): less than 757     Complications: None    Specimens:   * No specimens in log *    Implants:  Implant Name Type Inv.  Item Serial No.  Lot No. LRB No. Used Action   CEMENT BONE INJECTABLE Cement CEMENT BONE INJECTABLE  SYNTHASOME [id number] Left 1 Implanted   NAIL 11MM 130 DEG TI FELIX TFNA 340MM LT ST Screw/Plate/Nail/Luis NAIL 54IE 130 DEG TI FELIX TFNA 340MM LT ST  SYNTHES [id number] Left 1 Implanted   SCREW 10.5MM TI TFNA FEN 85MM Shoulder/Arm/Wrist/Hand SCREW 10.5MM TI TFNA FEN 85MM  SYNTHES [id number] Left 1 Implanted   SCREW LK TI 5.0MM X 32MM Screw/Plate/Nail/Luis SCREW LK TI 5.0MM X 32MM  SYNTHES [id number] Left 1 Implanted   SCREW LK W/ T25 STARDRIVE [id number] Screw/Plate/Nail/Luis SCREW LK W/ T25 STARDRIVE [id number]  SYNTHES [id number] Left 1 Implanted         Drains:   Urethral Catheter Temperature probe 16 fr (Active)   Catheter Indications Need for fluid management in critically ill patients in a critical care setting not able to be managed by other means such as BSC with hat, bedpan, urinal, condom catheter, or short term intermittent urethral catherization 10/26/20 1200   Securement Device Date Changed 10/25/20 10/25/20 1250   Site Assessment No urethral drainage 10/26/20 1200   Urine Color Yellow 10/26/20 1200   Urine Appearance Hazy 10/26/20 1200   Output (mL) 55 mL 10/26/20 1400       Findings: See op note    Electronically signed by Heaven Nolen DO on 10/26/2020 at 4:12 PM

## 2020-10-26 NOTE — ANESTHESIA POSTPROCEDURE EVALUATION
Department of Anesthesiology  Postprocedure Note    Patient: Christine Fowler  MRN: [id number]  YOB: 1935  Date of evaluation: 10/26/2020  Time:  4:40 PM     Procedure Summary     Date:  10/26/20 Room / [facility] / [facility]    Anesthesia Start:  9161 Anesthesia Stop:  1619    Procedure:  HIP OPEN REDUCTION INTERNAL FIXATION (Left Hip) Diagnosis:  (LEFT HIP FRACTURE)    Surgeon:  Faustino Arshad DO Responsible Provider:  Carmen Sosa MD    Anesthesia Type:  general ASA Status:  4          Anesthesia Type: general    Júnior Phase I:      Júnior Phase II:      Last vitals: Reviewed and per EMR flowsheets.        Anesthesia Post Evaluation    Patient location during evaluation: bedside  Patient participation: complete - patient participated  Level of consciousness: awake and alert  Airway patency: patent  Nausea & Vomiting: no nausea and no vomiting  Complications: no  Cardiovascular status: blood pressure returned to baseline and hemodynamically stable  Respiratory status: acceptable and spontaneous ventilation  Hydration status: euvolemic

## 2020-10-26 NOTE — PROGRESS NOTES
PCP is Sathya Arambula MD  Office notified of admission.       Electronically signed by Radha Lopes RN MSN APRN-NP [facility] NP  [facility]N 10/26/2020 2:50 PM

## 2020-10-26 NOTE — CONSULTS
Department of Orthopedic Surgery  Resident Consult Note          Reason for Consult: Left proximal humerus fracture, left intertrochanteric fracture    HISTORY OF PRESENT ILLNESS:       Patient is a 81 y.o. female who presents with the above findings. Patient has a history of dementia and cannot provide history but will follow simple commands. According to communications by nursing to family, patient was found in her hallway after an unwitnessed fall. She normally ambulates with with assistance and a wheeled walker, however she was not ambulating with walker at the time of fall. Complicated history with multiple fractures in the past.  Radiographic evidence of right proximal humerus fracture that is healed in a malunited position. In addition she has a history of a left humeral shaft fracture that was treated nonoperatively and is since healed. A chest x-ray taken for pneumonia on October 22 demonstrated a left proximal humerus fracture with minimal displacement. X-rays in the trauma bay demonstrated further displacement of the left proximal humerus fracture as well as a left intertrochanteric hip fracture. Denies numbness/tingling/paresthesias. Denies any other orthopedic complaints at this time.       Past Medical History:        Diagnosis Date    Anxiety     Arthritis     Back pain     CKD (chronic kidney disease)     stage 3    Depression     Fracture of left humerus     Hx of falling     Hyperlipidemia     Hypertension     Osteoporosis     Weakness      Past Surgical History:        Procedure Laterality Date    ARM SURGERY Left 8/4/2020    PARTIAL OSTECTOMY LEFT HUMERUS (CPT 47599) performed by Aislinn Benitez DO at [address] [place] [address], DIAGNOSTIC      EYE SURGERY      carmelo lense implants    HYSTERECTOMY      JOINT REPLACEMENT      NERVE BLOCK Left 03/28/2017    leftlumbar transforaminal NB #1     NERVE BLOCK Left 04/04/2017    Left lumbar transforaminal nerve block #2 L4-5, L5-S1    NERVE BLOCK Left 06/13/2017    transforaminal nerve block, lumbar #3    NERVE BLOCK Left 08/15/2017    paravertebral facet left lumbar #1    OTHER SURGICAL HISTORY  02/10/2017    CT guided lumbar spine bone biopsy    PARATHYROID GLAND SURGERY      NE LUMBAR SPINE FUSN,POST INTRBDY N/A 9/13/2018    L3-S1 FUSION, L4-L5, L5-S1  TRANSFORAMINAL LUMBAR INTERBODY FUSION -- NEEDS CAGES, PLATES, SCREWS, O-ARM, AUDIOLOGY, CHIARA TABLE, CELL SAVER, PLATELET GEL - [employer] performed by Sonya Mendez MD at [facility] OR     Current Medications:   Current Facility-Administered Medications: sodium chloride flush 0.9 % injection 10 mL, 10 mL, Intravenous, 2 times per day  sodium chloride flush 0.9 % injection 10 mL, 10 mL, Intravenous, PRN  acetaminophen (TYLENOL) tablet 650 mg, 650 mg, Oral, Q4H PRN  lactated ringers infusion, , Intravenous, Continuous  oxyCODONE (ROXICODONE) immediate release tablet 5 mg, 5 mg, Oral, Q4H PRN  fentaNYL (SUBLIMAZE) injection 25 mcg, 25 mcg, Intravenous, Q1H PRN  magnesium hydroxide (MILK OF MAGNESIA) 400 MG/5ML suspension 30 mL, 30 mL, Oral, Daily PRN  polyethylene glycol (GLYCOLAX) packet 17 g, 17 g, Oral, Daily  sennosides-docusate sodium (SENOKOT-S) 8.6-50 MG tablet 1 tablet, 1 tablet, Oral, BID  ondansetron (ZOFRAN) injection 4 mg, 4 mg, Intravenous, Q6H PRN  cefTRIAXone (ROCEPHIN) 1 g in sterile water 10 mL IV syringe, 1 g, Intravenous, Q24H  methocarbamol (ROBAXIN) tablet 500 mg, 500 mg, Oral, 4x Daily  labetalol (NORMODYNE) tablet 100 mg, 100 mg, Oral, BID  Allergies:  Patient has no known allergies. Social History:   TOBACCO:   reports that she has never smoked. She has never used smokeless tobacco.  ETOH:   reports no history of alcohol use. DRUGS:   reports no history of drug use.   ACTIVITIES OF DAILY LIVING:    OCCUPATION:    Family History:       Problem Relation Age of Onset    Diabetes Mother     High Blood Pressure Mother     Parkinsonism Father  Lung Cancer Brother     Heart Disease Brother        REVIEW OF SYSTEMS:  Unable to be obtained secondary to mental status    PHYSICAL EXAM:    VITALS:  /63   Pulse 68   Temp 96.6 °F (35.9 °C) (Temporal) Comment: temporal, bladder 33.4  Resp 16   Ht 5' (1.524 m)   Wt 132 lb 4.4 oz (60 kg)   SpO2 96%   BMI 25.83 kg/m²   CONSTITUTIONAL: Awake and in no acute distress  MUSCULOSKELETAL:  Left upper Extremity:   Skin intact circumferentially  +TTP about the proximal humerus. Negative tenderness to palpation about the hand, wrist, forearm, elbow, humerus  Compartments soft and compressible  Patient able to grasp hand   +Radial pulse, Brisk Cap refill, hand warm and perfused  Sensation unable to be fully assessed secondary to mental status    Right upper Extremity:  · Skin intact circumferentially  · No pain with range of motion of the right shoulder  · Compartments soft and compressible  · Patient will grasp hand  · +Radial pulse, Brisk Cap refill, hand warm and perfused  · Incision able unable to be assessed secondary to mental status    Right Lower Extremity:   · Skin is intact circumferentially  · Patient held in a shortened and externally rotated position  · +TTP about the hip  · Negative tenderness to palpation about the foot, ankle, tib-fib, knee, distal femur  · Compartments soft and compressible  · Palpable dorsalis pedis and posterior tibialis pulse, brisk cap refill to toes, foot warm and perfused  · Sensation unable to be properly assessed secondary to mental status  · Patient will wiggle toes    Secondary Exam:     · rightLE: No obvious signs of trauma. -TTP to foot, ankle, leg, knee, thigh, hip.       DATA:    CBC:   Lab Results   Component Value Date    WBC 13.8 10/25/2020    RBC 3.68 10/25/2020    HGB 11.2 10/25/2020    HCT 34.0 10/25/2020    MCV 92.4 10/25/2020    MCH 30.4 10/25/2020    MCHC 32.9 10/25/2020    RDW 14.7 10/25/2020     10/25/2020    MPV 9.6 10/25/2020     PT/INR: Lab Results   Component Value Date    PROTIME 11.9 10/25/2020    INR 1.1 10/25/2020       Radiology Review:  X-ray left shoulder: 3 part proximal humerus fracture with further medial displacement of the shaft compared to previous films. Humeral shaft fracture that has since healed    X-ray left hip: Comminuted intertrochanteric fracture with shortening, varus angulation, external rotation    X-ray right shoulder: Evidence of prior proximal humerus fracture that is healed    Chest x-ray from October 22: Evidence of left proximal humerus fracture with minimal displacement. Evidence of healed right proximal humerus fracture    IMPRESSION:  · Closed left intertrochanteric hip fracture  · Further displacement of prior left proximal humerus fracture  · History of right proximal humerus fracture and left humeral shaft fracture that have healed    PLAN:  Plan for surgery with pending clearance, tomorrow for L hip  NPO after Midnight, Needs medical assessment for surgery  Left upper extremity and left lower extremity non-weight bearing  Sling to left upper extremity  Pre-op Labs and Imaging  Pain control per SICU  Hold Anticoagulation   Treatment consent  Review and discuss with Dr. Jaylon Trejo Attending    I have seen and evaluated the patient with the resident and agree with the above assessments on today's visit. I have performed the key components of the history and physical examination and concur completely with the findings and plans as documented above. 79-year-old female with underlying dementia presents after unwitnessed fall. Patient was ambulating with a wheeled walker prior to recent injury. Patient does have history of multiple falls recently. 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 Imaging today does demonstrate left proximal humerus fracture which is also evident on previous chest x-ray back on October 22. New imaging also demonstrates comminuted displaced left femur intertrochanteric fracture with osteoporotic bone quality. PE as above. Had lengthy discussion with patient's family regarding their diagnosis, typical prognosis, and expected outcomes. We reviewed the possible complications from the injury itself despite treatment chosen. We also discussed treatment options including nonoperative managements versus surgical management, along with risks and benefits of each. Patient's family wishes to pursue nonoperative treatment for the left proximal humerus and is in agreement for operative stabilization of the left femur fracture to try to maintain her mobility. Medical admit with surgical optimization  Anticipate OR later today for left femur CMN stabilization. We will continue with sling immobilization for the left upper extremity nonweightbearing. We have explained the risks and complications of the recommended surgery with the patient and family at length, as well as discussed potential treatment alternatives including nonoperative management. These risks include but are not limited to death or complication from anesthesia, continued pain, nerve tendon or vascular injury, infection, nonunion or malunion, symptomatic hardware or hardware failure, deep vein thrombosis or pulmonary embolism, and need for further surgery, etc.  Patient and family understood this, asked appropriate questions, which were all answered, and they have elected to proceed with the procedure.     Electronically signed by   Lashawn Randolph DO  10/26/2020

## 2020-10-26 NOTE — PROGRESS NOTES
Dr. Chad Simmons,    Please note: Your patient is on a medication that requires a renal dose adjustment. Renal Function Assessment:    Date Body Weight IBW Adj. Body Weight SCr CrCl Dialysis status   10/26/2020 60 kg N/a N/a 2.1 16 ml/min No       Pharmacy has renally dose-adjusted the following medication(s):    Date Medication Original Dosing Regimen New Dosing Regimen   10/26/2020 Lovenox 40 mg sub-q daily 30 mg sub-q daily           These changes were made per protocol according to the [facility] Renal Function-Based Dose Adjustments Policy    *Please note this dose may need readjusted if your patient's renal function significantly improves. Please contact pharmacy with any questions regarding these changes.       Greg Martino [facility] 10/26/2020 4:50 PM

## 2020-10-26 NOTE — PROGRESS NOTES
Trauma Tertiary Survey    Admit Date: 10/25/2020  Hospital day 1    CC:  [facility]       Past Medical History:   Diagnosis Date    Anxiety     Arthritis     Back pain     CKD (chronic kidney disease)     stage 3    Depression     Fracture of left humerus     Hx of falling     Hyperlipidemia     Hypertension     Osteoporosis     Weakness        Alcohol pre-screening:  Women: How many times in the past year have you had 4 or more drinks in a day? none    Scheduled Meds:   methocarbamol IVPB  500 mg Intravenous Q8H    sodium chloride flush  10 mL Intravenous 2 times per day    polyethylene glycol  17 g Oral Daily    sennosides-docusate sodium  1 tablet Oral BID    cefTRIAXone (ROCEPHIN) IV  1 g Intravenous Q24H    labetalol  100 mg Oral BID     Continuous Infusions:   lactated ringers 75 mL/hr at 10/26/20 0135     PRN Meds:sodium chloride flush, acetaminophen, oxyCODONE, fentanNYL, magnesium hydroxide, ondansetron    Subjective:     Patient confused this morning, having some hip pain. She failed bedside swallow evaluation. Objective:     Patient Vitals for the past 8 hrs:   BP Temp Temp src Pulse Resp SpO2   10/26/20 0300 135/89 -- -- 96 20 90 %   10/26/20 0200 (!) 82/56 99 °F (37.2 °C) Rectal 86 21 91 %   10/26/20 0100 116/82 -- -- 79 18 93 %   10/26/20 0033 -- -- -- 79 -- --   10/26/20 0000 123/78 96.8 °F (36 °C) Rectal 87 22 95 %   10/25/20 2300 (!) 144/64 -- -- 79 24 96 %   10/25/20 2200 116/64 93.2 °F (34 °C) Rectal 81 (!) 34 97 %   10/25/20 2100 135/84 -- -- 62 18 98 %   10/25/20 2000 128/63 96.6 °F (35.9 °C) Temporal 68 16 96 %       I/O last 3 completed shifts: In: 663.8 [I.V.:663.8]  Out: 815 [Urine:815]  I/O this shift:   In: 0   Out: 80 [Urine:145]    Past Medical History:   Diagnosis Date    Anxiety     Arthritis     Back pain     CKD (chronic kidney disease)     stage 3    Depression     Fracture of left humerus     Hx of falling     Hyperlipidemia     Hypertension     Osteoporosis     Weakness        Radiology:  XR FEMUR LEFT (MIN 2 VIEWS)   Final Result   Comminuted left hip intertrochanteric fracture. XR HUMERUS RIGHT (MIN 2 VIEWS)   Final Result   No acute right shoulder or humerus fracture. Well corticated deformity of   the right proximal humerus from old healed fracture, similar to prior   radiographs. XR SHOULDER RIGHT (MIN 2 VIEWS)   Final Result   No acute right shoulder or humerus fracture. Well corticated deformity of   the right proximal humerus from old healed fracture, similar to prior   radiographs. XR PELVIS (1-2 VIEWS)   Preliminary Result   Angulated left intertrochanteric fracture. XR CHEST PORTABLE   Final Result   Stable left lung base atelectasis or infiltrate and possible left pleural   effusion             PHYSICAL EXAM:   GCS:  4 - Opens eyes on own   6 - Follows simple motor commands  4 - Seems confused, disoriented    Pupil size:  Left 3 mm Right 3 mm  Pupil reaction: Yes  Wiggles fingers: Left Yes Right Yes  Hand grasp:   Left normal   Right normal  Wiggles toes: Left Yes    Right Yes  Plantar flexion: Left normal  Right normal    PHYSICAL EXAM  General: Mild distress, uncomfortable  HEENT: Trachea midline, no masses, Pupils equal round   Cervical Spine C Collar Clearance -  Patient CT Spine Imaging normal.  Patient does not complain of Cervical Spine tenderness upon palpation. Patients C-Spine ranged. C-spine clear, no need for C-Collar. Chest: Respiratory effort was normal with no retractions or use of accessory muscles. Cardiovascular: Extremities warm, well perfused,   Abdomen:  Soft and non distended. No tenderness, guarding, rebound, or rigidity   Extremities: Moves all 4 extremeties, No pedal edema, left upper and lower extremity motion is limited 2/2 pain.  LUE ecchymosis     Spine:     Spine Tenderness ROM   Cervical 0 /10 Normal   Thoracic 0 /10 Normal   Lumbar 0 /10 Normal Musculoskeletal    Joint Tenderness Swelling ROM   Right shoulder absent absent normal   Left shoulder present absent abnormal - 2/2 pain   Right elbow absent absent normal   Left elbow absent absent normal   Right wrist absent absent normal   Left wrist absent absent normal   Right hand grasp absent absent normal   Left hand grasp absent absent normal   Right hip absent absent normal   Left hip present absent abnormal - 2/2 pain   Right knee absent absent normal   Left knee absent absent normal   Right ankle absent absent normal   Left ankle absent absent normal   Right foot absent absent normal   Left foot absent absent normal       CONSULTS: Orthopedic surgery. PROCEDURES: None    INJURIES:        Active Problems:    Trauma    Fall from standing    Closed intertrochanteric fracture of left femur (HCC)    Closed fracture of surgical neck of left humerus    Closed fracture of left clavicle  Resolved Problems:    * No resolved hospital problems. *        Assessment/Plan:       · Neuro:  AMS, unclear story from patient, per daughter patient not at her baseline. Failed bedside swallow evaluation, will get formal evaluation. Pain - tylenol, serina, fentanyl, robaxin. 1 time IV robaxin dose given, patient's BP not tolerating fentanyl       · CV: Initially hypotensive, now normotensive, at times hypertensive. Will restart labetalol. Monitor HD  · Pulm: No respiratory distress on room air, monitor HD. Early ambulation when possible   · GI: NPO for OR with orthopedic surgery today, no complaints. · Renal: CKD, appears to be baseline. Monitor UOP, has been adequate, Lehman in place. Hyperkalemia, improved with fluids. · ID: Leukocytosis, UA +, Rocephin for UTI. ? Hypotension due to sepsis, will continue to monitor.    · Endocrine: glucose high normal, will continue to monitor, no history of DM.   · MSK: L intertrochanteric fx for OR today with orthopedic surgery, L humerus fx, non operative management   · Heme: No acute issues, daily CBC      Bowel regime: glycolax  Pain control/Sedation: fentanyl, tylenol, serina, robaxin  DVT prophylaxis: SCDs    GI: NPO, will need formal swallow evaluation   Glucose protocol: None  Mouth/Eye care: as per patient . Lehman: in place    Code status:    Full Code    Patient/Family update:   Will update family     Disposition:  Continue ICU       Electronically signed by Carin Loredo MD on 10/26/20 at 3:31 AM EDT

## 2020-10-26 NOTE — PROGRESS NOTES
Occupational Therapy  Date:10/26/2020  Patient Name: Nabor Lopez  MRN: [id number]  : 1935  Room: [address] [place] [address]-A     OT order received and appreciated. Chart reviewed. Hold OT evaluation, pt scheduled for surgery . Will follow.     Sophia Roper OTR/L #[id number]

## 2020-10-27 ENCOUNTER — APPOINTMENT (OUTPATIENT)
Dept: GENERAL RADIOLOGY | Age: 85
DRG: 480 | End: 2020-10-27
Payer: MEDICARE

## 2020-10-27 LAB
ALBUMIN SERPL-MCNC: 3 G/DL (ref 3.5–5.2)
ALP BLD-CCNC: 128 U/L (ref 35–104)
ALT SERPL-CCNC: 27 U/L (ref 0–32)
ANION GAP SERPL CALCULATED.3IONS-SCNC: 14 MMOL/L (ref 7–16)
ANISOCYTOSIS: ABNORMAL
AST SERPL-CCNC: 74 U/L (ref 0–31)
BASOPHILIC STIPPLING: ABNORMAL
BASOPHILS ABSOLUTE: 0 E9/L (ref 0–0.2)
BASOPHILS RELATIVE PERCENT: 0.1 % (ref 0–2)
BILIRUB SERPL-MCNC: 0.4 MG/DL (ref 0–1.2)
BUN BLDV-MCNC: 52 MG/DL (ref 8–23)
CALCIUM IONIZED: 1.23 MMOL/L (ref 1.15–1.33)
CALCIUM SERPL-MCNC: 8.5 MG/DL (ref 8.6–10.2)
CHLORIDE BLD-SCNC: 113 MMOL/L (ref 98–107)
CO2: 21 MMOL/L (ref 22–29)
CREAT SERPL-MCNC: 2.1 MG/DL (ref 0.5–1)
EOSINOPHILS ABSOLUTE: 0.09 E9/L (ref 0.05–0.5)
EOSINOPHILS RELATIVE PERCENT: 0.9 % (ref 0–6)
GFR AFRICAN AMERICAN: 27
GFR NON-AFRICAN AMERICAN: 22 ML/MIN/1.73
GLUCOSE BLD-MCNC: 97 MG/DL (ref 74–99)
HCT VFR BLD CALC: 19.7 % (ref 34–48)
HCT VFR BLD CALC: 24.5 % (ref 34–48)
HEMOGLOBIN: 6.7 G/DL (ref 11.5–15.5)
HEMOGLOBIN: 8.2 G/DL (ref 11.5–15.5)
HYPOCHROMIA: ABNORMAL
LYMPHOCYTES ABSOLUTE: 0.82 E9/L (ref 1.5–4)
LYMPHOCYTES RELATIVE PERCENT: 7.8 % (ref 20–42)
MAGNESIUM: 1.7 MG/DL (ref 1.6–2.6)
MCH RBC QN AUTO: 31 PG (ref 26–35)
MCHC RBC AUTO-ENTMCNC: 34 % (ref 32–34.5)
MCV RBC AUTO: 91.2 FL (ref 80–99.9)
MONOCYTES ABSOLUTE: 0.31 E9/L (ref 0.1–0.95)
MONOCYTES RELATIVE PERCENT: 2.6 % (ref 2–12)
MYELOCYTE PERCENT: 0.9 % (ref 0–0)
NEUTROPHILS ABSOLUTE: 9.08 E9/L (ref 1.8–7.3)
NEUTROPHILS RELATIVE PERCENT: 87.8 % (ref 43–80)
NUCLEATED RED BLOOD CELLS: 0.9 /100 WBC
ORGANISM: ABNORMAL
OVALOCYTES: ABNORMAL
PDW BLD-RTO: 15.7 FL (ref 11.5–15)
PHOSPHORUS: 4.6 MG/DL (ref 2.5–4.5)
PLATELET # BLD: 116 E9/L (ref 130–450)
PMV BLD AUTO: 10.2 FL (ref 7–12)
POIKILOCYTES: ABNORMAL
POLYCHROMASIA: ABNORMAL
POTASSIUM REFLEX MAGNESIUM: 5 MMOL/L (ref 3.5–5)
RBC # BLD: 2.16 E12/L (ref 3.5–5.5)
SODIUM BLD-SCNC: 148 MMOL/L (ref 132–146)
TARGET CELLS: ABNORMAL
TOTAL PROTEIN: 5.4 G/DL (ref 6.4–8.3)
TSH SERPL DL<=0.05 MIU/L-ACNC: 1.08 UIU/ML (ref 0.27–4.2)
URINE CULTURE, ROUTINE: ABNORMAL
WBC # BLD: 10.2 E9/L (ref 4.5–11.5)

## 2020-10-27 PROCEDURE — 74018 RADEX ABDOMEN 1 VIEW: CPT

## 2020-10-27 PROCEDURE — 85018 HEMOGLOBIN: CPT

## 2020-10-27 PROCEDURE — 6360000002 HC RX W HCPCS: Performed by: STUDENT IN AN ORGANIZED HEALTH CARE EDUCATION/TRAINING PROGRAM

## 2020-10-27 PROCEDURE — 6370000000 HC RX 637 (ALT 250 FOR IP): Performed by: STUDENT IN AN ORGANIZED HEALTH CARE EDUCATION/TRAINING PROGRAM

## 2020-10-27 PROCEDURE — 2580000003 HC RX 258: Performed by: STUDENT IN AN ORGANIZED HEALTH CARE EDUCATION/TRAINING PROGRAM

## 2020-10-27 PROCEDURE — 6360000002 HC RX W HCPCS: Performed by: SURGERY

## 2020-10-27 PROCEDURE — 83735 ASSAY OF MAGNESIUM: CPT

## 2020-10-27 PROCEDURE — 36415 COLL VENOUS BLD VENIPUNCTURE: CPT

## 2020-10-27 PROCEDURE — 2580000003 HC RX 258: Performed by: SURGERY

## 2020-10-27 PROCEDURE — 36430 TRANSFUSION BLD/BLD COMPNT: CPT

## 2020-10-27 PROCEDURE — 99291 CRITICAL CARE FIRST HOUR: CPT | Performed by: SURGERY

## 2020-10-27 PROCEDURE — 80053 COMPREHEN METABOLIC PANEL: CPT

## 2020-10-27 PROCEDURE — 84443 ASSAY THYROID STIM HORMONE: CPT

## 2020-10-27 PROCEDURE — 85014 HEMATOCRIT: CPT

## 2020-10-27 PROCEDURE — 85025 COMPLETE CBC W/AUTO DIFF WBC: CPT

## 2020-10-27 PROCEDURE — 2709999900 HC NON-CHARGEABLE SUPPLY

## 2020-10-27 PROCEDURE — P9016 RBC LEUKOCYTES REDUCED: HCPCS

## 2020-10-27 PROCEDURE — 2700000000 HC OXYGEN THERAPY PER DAY

## 2020-10-27 PROCEDURE — 2000000000 HC ICU R&B

## 2020-10-27 PROCEDURE — 84100 ASSAY OF PHOSPHORUS: CPT

## 2020-10-27 PROCEDURE — 6370000000 HC RX 637 (ALT 250 FOR IP): Performed by: SURGERY

## 2020-10-27 PROCEDURE — 82330 ASSAY OF CALCIUM: CPT

## 2020-10-27 RX ORDER — DEXTROSE AND SODIUM CHLORIDE 5; .45 G/100ML; G/100ML
INJECTION, SOLUTION INTRAVENOUS CONTINUOUS
Status: DISCONTINUED | OUTPATIENT
Start: 2020-10-27 | End: 2020-10-28

## 2020-10-27 RX ORDER — ACETAMINOPHEN 160 MG/5ML
650 SOLUTION ORAL EVERY 6 HOURS
Status: DISCONTINUED | OUTPATIENT
Start: 2020-10-27 | End: 2020-11-02 | Stop reason: HOSPADM

## 2020-10-27 RX ORDER — 0.9 % SODIUM CHLORIDE 0.9 %
20 INTRAVENOUS SOLUTION INTRAVENOUS ONCE
Status: COMPLETED | OUTPATIENT
Start: 2020-10-27 | End: 2020-10-27

## 2020-10-27 RX ORDER — MAGNESIUM SULFATE IN WATER 40 MG/ML
2 INJECTION, SOLUTION INTRAVENOUS ONCE
Status: COMPLETED | OUTPATIENT
Start: 2020-10-27 | End: 2020-10-27

## 2020-10-27 RX ADMIN — Medication 10 ML: at 21:48

## 2020-10-27 RX ADMIN — ACETAMINOPHEN 650 MG: 650 SUPPOSITORY RECTAL at 00:28

## 2020-10-27 RX ADMIN — MAGNESIUM SULFATE HEPTAHYDRATE 2 G: 40 INJECTION, SOLUTION INTRAVENOUS at 08:31

## 2020-10-27 RX ADMIN — Medication 2 G: at 00:28

## 2020-10-27 RX ADMIN — DEXTROSE AND SODIUM CHLORIDE: 5; 450 INJECTION, SOLUTION INTRAVENOUS at 09:41

## 2020-10-27 RX ADMIN — HEPARIN SODIUM 5000 UNITS: 10000 INJECTION INTRAVENOUS; SUBCUTANEOUS at 21:47

## 2020-10-27 RX ADMIN — ACETAMINOPHEN 650 MG: 650 SUPPOSITORY RECTAL at 05:38

## 2020-10-27 RX ADMIN — HEPARIN SODIUM 5000 UNITS: 10000 INJECTION INTRAVENOUS; SUBCUTANEOUS at 14:59

## 2020-10-27 RX ADMIN — ACETAMINOPHEN ORAL SOLUTION 650 MG: 650 SOLUTION ORAL at 16:14

## 2020-10-27 RX ADMIN — ACETAMINOPHEN ORAL SOLUTION 650 MG: 650 SOLUTION ORAL at 21:47

## 2020-10-27 RX ADMIN — ACETAMINOPHEN 650 MG: 650 SUPPOSITORY RECTAL at 11:33

## 2020-10-27 RX ADMIN — Medication 2 G: at 05:37

## 2020-10-27 RX ADMIN — SODIUM CHLORIDE, POTASSIUM CHLORIDE, SODIUM LACTATE AND CALCIUM CHLORIDE: 600; 310; 30; 20 INJECTION, SOLUTION INTRAVENOUS at 05:38

## 2020-10-27 RX ADMIN — SENNOSIDES AND DOCUSATE SODIUM 1 TABLET: 8.6; 5 TABLET ORAL at 21:47

## 2020-10-27 RX ADMIN — SODIUM CHLORIDE 20 ML: 9 INJECTION, SOLUTION INTRAVENOUS at 09:42

## 2020-10-27 RX ADMIN — Medication 10 ML: at 08:00

## 2020-10-27 RX ADMIN — HEPARIN SODIUM 5000 UNITS: 10000 INJECTION INTRAVENOUS; SUBCUTANEOUS at 05:36

## 2020-10-27 RX ADMIN — CEFTRIAXONE SODIUM 1 G: 1 INJECTION, POWDER, FOR SOLUTION INTRAMUSCULAR; INTRAVENOUS at 13:41

## 2020-10-27 ASSESSMENT — PAIN SCALES - PAIN ASSESSMENT IN ADVANCED DEMENTIA (PAINAD)
CONSOLABILITY: 0
TOTALSCORE: 0
FACIALEXPRESSION: 0
NEGVOCALIZATION: 0
BREATHING: 1
FACIALEXPRESSION: 0
BODYLANGUAGE: 0
CONSOLABILITY: 0
TOTALSCORE: 2
FACIALEXPRESSION: 0
BODYLANGUAGE: 0
TOTALSCORE: 1
BREATHING: 0
BODYLANGUAGE: 0
BODYLANGUAGE: 0
BREATHING: 1
CONSOLABILITY: 0
FACIALEXPRESSION: 0
TOTALSCORE: 1
BREATHING: 1
TOTALSCORE: 0
NEGVOCALIZATION: 1
FACIALEXPRESSION: 0
BODYLANGUAGE: 0
TOTALSCORE: 0
FACIALEXPRESSION: 0
NEGVOCALIZATION: 0
NEGVOCALIZATION: 0
FACIALEXPRESSION: 0
BREATHING: 1
CONSOLABILITY: 0
BREATHING: 0
BODYLANGUAGE: 0
CONSOLABILITY: 0
CONSOLABILITY: 0
TOTALSCORE: 1
FACIALEXPRESSION: 0
TOTALSCORE: 0
FACIALEXPRESSION: 0
BODYLANGUAGE: 0
NEGVOCALIZATION: 0
FACIALEXPRESSION: 0
NEGVOCALIZATION: 0
BREATHING: 1
CONSOLABILITY: 0
BREATHING: 0
NEGVOCALIZATION: 0
NEGVOCALIZATION: 0
TOTALSCORE: 1
BODYLANGUAGE: 0
NEGVOCALIZATION: 1
BREATHING: 1
BODYLANGUAGE: 0
NEGVOCALIZATION: 0
BREATHING: 1
CONSOLABILITY: 0
CONSOLABILITY: 0
FACIALEXPRESSION: 0
BREATHING: 0
CONSOLABILITY: 0
NEGVOCALIZATION: 1
TOTALSCORE: 2
BODYLANGUAGE: 0
TOTALSCORE: 2
CONSOLABILITY: 0
BODYLANGUAGE: 0

## 2020-10-27 ASSESSMENT — PAIN SCALES - GENERAL
PAINLEVEL_OUTOF10: 1
PAINLEVEL_OUTOF10: 0
PAINLEVEL_OUTOF10: 1
PAINLEVEL_OUTOF10: 0
PAINLEVEL_OUTOF10: 2
PAINLEVEL_OUTOF10: 0
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
## 2020-10-27 NOTE — PROGRESS NOTES
Physical Therapy    PT consult to evaluate/treat received and appreciated. Pt chart reviewed and evaluation attempted. Pt is currently not following commands per nursing staff. Will check back as able. Thank you.         Jenny Leslie, PT, DPT   [id number]

## 2020-10-27 NOTE — OP NOTE
The patient is a 81y.o. year old female with history of above preop diagnosis. Diagnosis and typical prognosis from injury were discussed in detail with patient and family. I explained the risk, benefits, expected outcome, and alternatives to the procedure. Patient and family verbalized understanding and elected to proceed with the surgery as outlined. Patient also with multiple malunions bilateral humeri as well as what appears to be subacute new fracture on the left proximal humerus. Discussed in detail the injury with patient and family they have elected to continue nonoperative management for upper extremity injuries. OPERATIVE COURSE: The patient was seen and identified outside the operative suite, in which the operative site was marked as appropriate by patient, surgeon, staff, and anesthesia. After intubation, patient was placed on the fracture table. All bony prominences and neurovascular structures were well padded and protexted. Patient's well leg was flexed, abducted, and secured into a well-padded leg well. The operative leg was placed in gentle inline traction. After being positioned, a time out was performed indicating the appropriate identification of the patient, the procedure to be performed, and the side to be performed upon. This was agreed upon by all individuals in the room. Traction and reduction maneuver were applied to the operative leg. Fluoroscopy was brought in showing a near-anatomic reduction of the intertrochanteric hip fracture. The affected lower extremity was then sterilely prepped and draped in a standard fashion. At this point in time, a small incision proximal to the greater trochanter was used with proper hemostasis, and the guide pin for the cephalomedullary nail was then inserted percutaneously under fluoroscopic guidance in the appropriate position in the proximal femur.  This was checked under both the AP and lateral.     After this was deemed to be in the appropriate position. The entry reamer was then used to access the proximal femur making sure not to disrupt the abductors. A ball-tip guidewire was placed down to the knee. X-rays were taken to confirm placement down to the knee. Then the length of the nail was measured. Sequential reaming was carried out until cortical chater and then 1.5mm beyond this. The nail was then placed into position under fluoroscopic guidance. The trochar for the lag screw was then placed. An incision was made laterally to allow access with the jig. The guidewire was then inserted under fluoroscopic guidance. Once in an appropriate position of center center in the femoral head, the length of the lag screw was then reamed appropriately. The lag screw was then placed. The top of the nail was then locked. The fracture was compressed. Fluoroscopy was used showing the screw in good position with an acceptable tip-to-apex distance. Due to the advanced osteoporotic bone quality cement was then injected through the lag screw into the femoral head under fluoroscopic guidance for augmented fixation. At this point attention was turned to the distal aspect of the device were a lateral to medial interlocking screw was placed using perfect [place] fluoroscopy through a small stab incision. Final x-rays were then conducted demonstrating acceptable alignment and fixation. The incisions were then copiously irrigated with sterile normal saline. The deep tissues were closed with #0 Vicryl in a watertight fashion. The subcutaneous tissues and skin were closed with 2-0 Vicryl in an interrupted fashion. The incision sites were then dressed in a sterile manner. The patient's compartments were soft and compressible at the end of this portion of the procedure. The patient then had her legs removed from all devices. The patient was moved safely back to a hospital room bed, by multiple  individuals.  The patient was taken to the PACU in stable condition. DISPOSITION: The patient was taken to PACU in guarded condition. Once stable, the patient will be transferred to the floor under the medical service. Orders have been provided to begin physical therapy, weight bear as tolerated Left lower extremity. Patient received a dose of ancef preoperatively. We will continue this for 24 hours postoperatively for infection prophylaxis. The patient will also be started on chemical DVT prophylaxis. Electronically signed by Swapferit on 10/26/2020     NOTE: This report was transcribed using voice recognition software.  Every effort was made to ensure accuracy; however, inadvertent computerized transcription errors may be present

## 2020-10-28 LAB
ALBUMIN SERPL-MCNC: 3 G/DL (ref 3.5–5.2)
ALP BLD-CCNC: 303 U/L (ref 35–104)
ALT SERPL-CCNC: 16 U/L (ref 0–32)
ANION GAP SERPL CALCULATED.3IONS-SCNC: 8 MMOL/L (ref 7–16)
ANISOCYTOSIS: ABNORMAL
AST SERPL-CCNC: 95 U/L (ref 0–31)
BASOPHILS ABSOLUTE: 0 E9/L (ref 0–0.2)
BASOPHILS RELATIVE PERCENT: 0.1 % (ref 0–2)
BILIRUB SERPL-MCNC: 0.4 MG/DL (ref 0–1.2)
BUN BLDV-MCNC: 54 MG/DL (ref 8–23)
BURR CELLS: ABNORMAL
CALCIUM IONIZED: 1.2 MMOL/L (ref 1.15–1.33)
CALCIUM SERPL-MCNC: 8.3 MG/DL (ref 8.6–10.2)
CHLORIDE BLD-SCNC: 111 MMOL/L (ref 98–107)
CO2: 23 MMOL/L (ref 22–29)
CREAT SERPL-MCNC: 1.8 MG/DL (ref 0.5–1)
EKG ATRIAL RATE: 85 BPM
EKG P AXIS: 60 DEGREES
EKG P-R INTERVAL: 144 MS
EKG Q-T INTERVAL: 354 MS
EKG QRS DURATION: 76 MS
EKG QTC CALCULATION (BAZETT): 421 MS
EKG R AXIS: -4 DEGREES
EKG T AXIS: 30 DEGREES
EKG VENTRICULAR RATE: 85 BPM
EOSINOPHILS ABSOLUTE: 0 E9/L (ref 0.05–0.5)
EOSINOPHILS RELATIVE PERCENT: 0 % (ref 0–6)
GFR AFRICAN AMERICAN: 32
GFR NON-AFRICAN AMERICAN: 27 ML/MIN/1.73
GLUCOSE BLD-MCNC: 135 MG/DL (ref 74–99)
HCT VFR BLD CALC: 23.4 % (ref 34–48)
HEMOGLOBIN: 7.9 G/DL (ref 11.5–15.5)
HYPOCHROMIA: ABNORMAL
LYMPHOCYTES ABSOLUTE: 0.42 E9/L (ref 1.5–4)
LYMPHOCYTES RELATIVE PERCENT: 4.4 % (ref 20–42)
MAGNESIUM: 2.4 MG/DL (ref 1.6–2.6)
MCH RBC QN AUTO: 31.1 PG (ref 26–35)
MCHC RBC AUTO-ENTMCNC: 33.8 % (ref 32–34.5)
MCV RBC AUTO: 92.1 FL (ref 80–99.9)
MONOCYTES ABSOLUTE: 0.73 E9/L (ref 0.1–0.95)
MONOCYTES RELATIVE PERCENT: 7 % (ref 2–12)
NEUTROPHILS ABSOLUTE: 9.26 E9/L (ref 1.8–7.3)
NEUTROPHILS RELATIVE PERCENT: 88.6 % (ref 43–80)
NUCLEATED RED BLOOD CELLS: 0.9 /100 WBC
PDW BLD-RTO: 15.9 FL (ref 11.5–15)
PHOSPHORUS: 3.4 MG/DL (ref 2.5–4.5)
PLATELET # BLD: 123 E9/L (ref 130–450)
PMV BLD AUTO: 10 FL (ref 7–12)
POIKILOCYTES: ABNORMAL
POLYCHROMASIA: ABNORMAL
POTASSIUM REFLEX MAGNESIUM: 4.3 MMOL/L (ref 3.5–5)
RBC # BLD: 2.54 E12/L (ref 3.5–5.5)
SODIUM BLD-SCNC: 142 MMOL/L (ref 132–146)
TOTAL PROTEIN: 5.5 G/DL (ref 6.4–8.3)
WBC # BLD: 10.4 E9/L (ref 4.5–11.5)

## 2020-10-28 PROCEDURE — 2580000003 HC RX 258: Performed by: STUDENT IN AN ORGANIZED HEALTH CARE EDUCATION/TRAINING PROGRAM

## 2020-10-28 PROCEDURE — 6370000000 HC RX 637 (ALT 250 FOR IP): Performed by: SURGERY

## 2020-10-28 PROCEDURE — 83735 ASSAY OF MAGNESIUM: CPT

## 2020-10-28 PROCEDURE — 2060000000 HC ICU INTERMEDIATE R&B

## 2020-10-28 PROCEDURE — 97162 PT EVAL MOD COMPLEX 30 MIN: CPT

## 2020-10-28 PROCEDURE — 97530 THERAPEUTIC ACTIVITIES: CPT

## 2020-10-28 PROCEDURE — 6360000002 HC RX W HCPCS: Performed by: STUDENT IN AN ORGANIZED HEALTH CARE EDUCATION/TRAINING PROGRAM

## 2020-10-28 PROCEDURE — 6370000000 HC RX 637 (ALT 250 FOR IP): Performed by: STUDENT IN AN ORGANIZED HEALTH CARE EDUCATION/TRAINING PROGRAM

## 2020-10-28 PROCEDURE — 84100 ASSAY OF PHOSPHORUS: CPT

## 2020-10-28 PROCEDURE — 36415 COLL VENOUS BLD VENIPUNCTURE: CPT

## 2020-10-28 PROCEDURE — 2580000003 HC RX 258: Performed by: SURGERY

## 2020-10-28 PROCEDURE — 82330 ASSAY OF CALCIUM: CPT

## 2020-10-28 PROCEDURE — 80053 COMPREHEN METABOLIC PANEL: CPT

## 2020-10-28 PROCEDURE — 93010 ELECTROCARDIOGRAM REPORT: CPT | Performed by: INTERNAL MEDICINE

## 2020-10-28 PROCEDURE — 99232 SBSQ HOSP IP/OBS MODERATE 35: CPT | Performed by: SURGERY

## 2020-10-28 PROCEDURE — 6360000002 HC RX W HCPCS: Performed by: SURGERY

## 2020-10-28 PROCEDURE — 2700000000 HC OXYGEN THERAPY PER DAY

## 2020-10-28 PROCEDURE — 85025 COMPLETE CBC W/AUTO DIFF WBC: CPT

## 2020-10-28 PROCEDURE — 97166 OT EVAL MOD COMPLEX 45 MIN: CPT

## 2020-10-28 RX ORDER — LABETALOL 100 MG/1
50 TABLET, FILM COATED ORAL 2 TIMES DAILY
Status: DISCONTINUED | OUTPATIENT
Start: 2020-10-28 | End: 2020-10-28

## 2020-10-28 RX ORDER — AMLODIPINE BESYLATE 5 MG/1
5 TABLET ORAL DAILY
Status: DISCONTINUED | OUTPATIENT
Start: 2020-10-28 | End: 2020-11-02 | Stop reason: HOSPADM

## 2020-10-28 RX ADMIN — AMLODIPINE BESYLATE 5 MG: 5 TABLET ORAL at 10:42

## 2020-10-28 RX ADMIN — HEPARIN SODIUM 5000 UNITS: 10000 INJECTION INTRAVENOUS; SUBCUTANEOUS at 06:03

## 2020-10-28 RX ADMIN — ACETAMINOPHEN ORAL SOLUTION 650 MG: 650 SOLUTION ORAL at 15:52

## 2020-10-28 RX ADMIN — SENNOSIDES AND DOCUSATE SODIUM 1 TABLET: 8.6; 5 TABLET ORAL at 20:31

## 2020-10-28 RX ADMIN — Medication 10 ML: at 20:31

## 2020-10-28 RX ADMIN — POLYETHYLENE GLYCOL 3350 17 G: 17 POWDER, FOR SOLUTION ORAL at 08:09

## 2020-10-28 RX ADMIN — CEFTRIAXONE SODIUM 1 G: 1 INJECTION, POWDER, FOR SOLUTION INTRAMUSCULAR; INTRAVENOUS at 14:03

## 2020-10-28 RX ADMIN — ACETAMINOPHEN ORAL SOLUTION 650 MG: 650 SOLUTION ORAL at 05:19

## 2020-10-28 RX ADMIN — HEPARIN SODIUM 5000 UNITS: 10000 INJECTION INTRAVENOUS; SUBCUTANEOUS at 21:30

## 2020-10-28 RX ADMIN — ACETAMINOPHEN ORAL SOLUTION 650 MG: 650 SOLUTION ORAL at 10:42

## 2020-10-28 RX ADMIN — SENNOSIDES AND DOCUSATE SODIUM 1 TABLET: 8.6; 5 TABLET ORAL at 08:09

## 2020-10-28 RX ADMIN — HEPARIN SODIUM 5000 UNITS: 10000 INJECTION INTRAVENOUS; SUBCUTANEOUS at 14:03

## 2020-10-28 RX ADMIN — DULOXETINE HYDROCHLORIDE 30 MG: 30 CAPSULE, DELAYED RELEASE ORAL at 08:09

## 2020-10-28 RX ADMIN — ACETAMINOPHEN ORAL SOLUTION 650 MG: 650 SOLUTION ORAL at 21:30

## 2020-10-28 ASSESSMENT — PAIN SCALES - PAIN ASSESSMENT IN ADVANCED DEMENTIA (PAINAD)
BODYLANGUAGE: 0
TOTALSCORE: 0
CONSOLABILITY: 0
BODYLANGUAGE: 0
NEGVOCALIZATION: 0
CONSOLABILITY: 0
BODYLANGUAGE: 0
FACIALEXPRESSION: 0
TOTALSCORE: 0
NEGVOCALIZATION: 0
FACIALEXPRESSION: 0
NEGVOCALIZATION: 0
BREATHING: 0
TOTALSCORE: 0
NEGVOCALIZATION: 0
BREATHING: 0
BREATHING: 0
BODYLANGUAGE: 0
CONSOLABILITY: 0
BODYLANGUAGE: 0
NEGVOCALIZATION: 0
BREATHING: 0
FACIALEXPRESSION: 0
CONSOLABILITY: 0
CONSOLABILITY: 0
TOTALSCORE: 1
BREATHING: 0
FACIALEXPRESSION: 0
TOTALSCORE: 0
CONSOLABILITY: 1
NEGVOCALIZATION: 0
NEGVOCALIZATION: 0
CONSOLABILITY: 0
TOTALSCORE: 0
NEGVOCALIZATION: 0
NEGVOCALIZATION: 0
BREATHING: 0
CONSOLABILITY: 0
BREATHING: 0
BODYLANGUAGE: 0
FACIALEXPRESSION: 0
BODYLANGUAGE: 0
TOTALSCORE: 0
BREATHING: 0
TOTALSCORE: 0
BREATHING: 0
TOTALSCORE: 0
FACIALEXPRESSION: 0
CONSOLABILITY: 0
FACIALEXPRESSION: 0
FACIALEXPRESSION: 0
NEGVOCALIZATION: 0
FACIALEXPRESSION: 0
BREATHING: 0
FACIALEXPRESSION: 0
BODYLANGUAGE: 0
CONSOLABILITY: 0
TOTALSCORE: 0

## 2020-10-28 ASSESSMENT — PAIN SCALES - GENERAL
PAINLEVEL_OUTOF10: 0
PAINLEVEL_OUTOF10: 3
PAINLEVEL_OUTOF10: 0

## 2020-10-28 ASSESSMENT — PAIN DESCRIPTION - PAIN TYPE: TYPE: CHRONIC PAIN

## 2020-10-28 ASSESSMENT — PAIN DESCRIPTION - ORIENTATION: ORIENTATION: LEFT

## 2020-10-28 ASSESSMENT — PAIN DESCRIPTION - LOCATION: LOCATION: SHOULDER
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
## 2020-10-28 NOTE — PLAN OF CARE
Problem: Pain:  Goal: Pain level will decrease  Description: Pain level will decrease  10/28/2020 1534 by Janis Ritchie RN  Outcome: Met This Shift  10/28/2020 1038 by Veronica Pena RN  Outcome: Met This Shift  Goal: Control of acute pain  Description: Control of acute pain  Outcome: Met This Shift  Goal: Control of chronic pain  Description: Control of chronic pain  Outcome: Met This Shift     Problem: Skin Integrity:  Goal: Will show no infection signs and symptoms  Description: Will show no infection signs and symptoms  Outcome: Met This Shift  Goal: Absence of new skin breakdown  Description: Absence of new skin breakdown  Outcome: Met This Shift     Problem: Falls - Risk of:  Goal: Will remain free from falls  Description: Will remain free from falls  10/28/2020 1534 by Janis Ritchie RN  Outcome: Met This Shift  10/28/2020 1038 by Veronica Pena RN  Outcome: Met This Shift  Goal: Absence of physical injury  Description: Absence of physical injury  Outcome: Met This Shift

## 2020-10-28 NOTE — PROGRESS NOTES
Occupational Therapy  Occupational Therapy Initial Evaluation   Date:10/28/2020  Patient Name: Stiven Galdamez  MRN: [id number]  : 1935  Room: [address] [place], [place] [address]-A    Referring Provider: Darrian Roberson DO    Evaluating OT: LIVIA Gay #[id number]    AM-PAC Daily Activity Raw Score:     Recommended Adaptive Equipment: TBD     Diagnosis: Trauma [T14.90XA]  Trauma [T14.90XA]    Reason for admission: fall down steps    Surgery/Procedure: femur CMN, closed treatment of L proximal humerus fx 10/26    Pertinent Medical History: Anxiety, arthritis, CKD, depression, fracture of L humerus, hx of falling, HLD, HTN, osteoporosis    Precautions:  Falls, NWB LUE, NWB LLE, NGT, sling LUE, O2, PEG     Home Living: Pt lives with  in a 1 story house with 3+1 step(s) to enter and B rail(s); bed/bath on 1st floor  Bathroom setup: tub/shower  Equipment owned: ww, cane, w/c, shower chair    Prior Level of Function: Assistance with ADLs; Assistance with IADLs. ww for ambulation.      Pain Level: pt c/o L shoulder pain this session; re-positioned pt's shoulder on pillow for comfort    Cognition: A&O: (oriented to self, \"hospital\" and year)  Follows 1 step commands    Memory: poor+   Comprehension poor+   Problem solving: poor+   Judgement/safety: poor+               Communication skills:            Vision: wfl               Glasses:?                                                   Hearing: wfl     RASS: -1  CAM-ICU: (NT) Delirium    UE Assessment:  Hand Dominance: Right [x]  Left []     ROM Strength STM goal: PRN   RUE  AAROM WFL 3+/5 4/5     LUE Distally: PROM/AAROM WFL  Proximally: NT NT d/t precautions n/a       Sensation: No c/o numbness or tingling in extremities   Tone: WNL   Edema: none noted     Functional Assessment   Initial Eval Status  Date: 10/28/20 Treatment Status  Date: STG=LTG  5-14  days    Feeding PEG                        Grooming Max A (to wash face/hands/to comb hair seated EOB-cueing for sequencing/safety/thoroughness of completion of task)                       Min A   while seated in chair position in bed     UB dressing Max A                        Mod. A       LB dressing Dep  Max A   using AE as needed for safe reach/ energy conservation     Bathing Max A                        Mod. A   using AE as needed for safe reach/ energy conservation       Toileting Dep                        Max A     Bed Mobility  Supine to sit: Max A x2    Sit to supine: Max Ax2                        Min. A  in prep of ADL tasks & transfers   Functional Transfers Sit to stand: NT    Stand to sit: NT                        Max A  sit<>stand/functional bathroom transfers using AD/DME as needed for balance and safety   Functional Mobility NT          TBD     Balance Sitting:     Static: Max A    Dynamic:Max A  Standing: NT  Min. A dynamic sitting balance; Max A dynamic standing balance  during ADL tasks & transfers   Endurance/Activity Tolerance   Poor+ tolerance with light activity. Pt sat EOB for <8 minutes to improve sitting balance/upright posture for ADLs. Fair+   tolerance with light/moderate activity/self care routine   Visual/  Perceptual   WFL                       Vitals:   HR at rest: 71 bpm HR at end of session: 62 bpm   Spo2 at rest:98% Spo2 at end of session 95%   BP at rest:138/64 mmHg BP at end of session 171/94 mmHg       Treatment:   · Bed mobility: Facilitated bed mobility with cues for proper body mechanics and sequencing to prepare for ADL completion. Assist of 2 for safety d/t pt's weakness/WB precautions. · ADL completion: Self-care retraining for the above-mentioned ADLs; training on proper hand placement, safety technique, sequencing, and energy conservation techniques. · Therapeutic Exercises: Annette Johnson completed at all levels to maintain strength/flexibility and to decrease edema/contractures to enhance ADL completion.  VIN FRY/PROM completed distally d/t humerus fracture to prevent contractures. · Postural Balance: Sitting balance retraining to improve righting reactions with postural changes during ADLS. · Cognitive/Perceptual training: retraining exercises to improve attention, mentation, and spatial awareness for ADLs & transfers. · Skilled positioning: Proper positioning to improve interaction with environment, overall functioning and decrease/prevent edema and contractures. · Delirium Prevention/Management: Implementation of non-pharmacologic interventions for delirium prevention incorporated throughout session to patient. Including environmental and sensory modifications to decrease patient's internal distraction caused by delirium, and improve overall mentation. Comments: OK from RN to see patient. Upon arrival, patient lying in bed. Pt demo fair- tolerance with poor understanding of education/techniques. At end of session, patient lying in bed. Call light within reach, all lines and tubes intact. Pt instructed on use of call light for assistance and fall prevention. Line management and environmental modifications made prior to and end of session to ensure patient safety and to increase efficiency of session. Skilled monitoring of HR, O2 saturation, blood pressure and patient's response to activity performed throughout session. Overall, pt presents with decreased activity tolerance, dynamic balance, functional mobility limiting completion of ADLs and safe return home. Pt can benefit from continued skilled OT to increase safety and functional independence. Evaluation Complexity: Mod    · History: Expanded chart review of consults, imaging, and psychosocial history related to current functional performance. · Exam: 5+ performance deficits identified limiting functional independence and safe return home   · Assistance/Modification: Min/mod assistance or modifications required to perform tasks. May have comorbidities that affect occupational performance.     Assessment of current deficits   Functional mobility [x]  ADLs [x] Strength [x]  Cognition [x]  Functional transfers  [x] IADLs [x] Safety Awareness [x]  Endurance [x]  Fine Motor Coordination [x] Balance [x] Vision/perception [] Sensation []   Gross Motor Coordination [] ROM [] Delirium []                  Communication []    Plan of Care: OT 1-3x/week for 5-7 days PRN   [x] ADL retraining/AE recommendations   [x] Energy Conservation Techniques/Strategies      [] Neuromuscular Re-Education      [x] Functional Transfer Training         [x] Functional Mobility Training          [x] Cognitive Re-Training          [x] Splinting/Positioning Needs           [x] Therapeutic Activity   [x]Therapeutic Exercise   [x] Visual/Perceptual   [x] Delirium Prevention/Treatment   [x] Positioning to Improve Functional [place], Safety, and Skin Integrity   [x] Patient and/or Family Education to Increase Safety and Functional [place]   [] Other:            Rehab Potential: good for established goals    Patient / Family Goal: not stated    Patient and/or family were instructed/educated on diagnosis, prognosis/goals and plan of care. Pt demonstrated poor understanding. [] Malnutrition indicators have been identified and nursing has been notified to ensure a dietitian consult is ordered.          Mod Evaluation   Time In:9:05   Time Out: 9:25  Total Treatment Time: 15 minutes            Mins Units   OT Eval Low 44426     OT Eval Medium 97166 x 1   OT Eval High 60256     OT Re-Eval 27166     Ther Ex  85353     Manual Therapy 25473     Thera Activities 50706 10 1   ADL/Home Mgt 07468 5    Neuro Re-ed 45441     Orthotic manage/training  97057     Non-Billable Time               Evaluation time includes thorough review of current medical information, gathering information on past medical history/social history and prior level of function, completion of standardized testing/informal observation of tasks, assessment of data and development of POC/Goals.      Gertrudis Gates, OTR/L #[id number]

## 2020-10-28 NOTE — PROGRESS NOTES
Department of Orthopedic Surgery  Resident Progress Note    Patient seen and examined. Patient awake and following commands this morning. VITALS:  BP (!) 142/74   Pulse 66   Temp 97.3 °F (36.3 °C) (Bladder)   Resp 11   Ht 5' (1.524 m)   Wt 144 lb 2.9 oz (65.4 kg)   SpO2 97%   BMI 28.16 kg/m²     GENERAL: awake, following commands  MUSCULOSKELETAL:   left lower extremity:  · Dressing C/D/I  · Compartments soft and compressible  · Palpable dorsalis pedis and posterior tibialis pulse, brisk cap refill to toes, foot warm and perfused  · Sensation intact to foot throughout    Left upper extremity:  · Skin intact  · Ecchymosis present  · +TTP  · No pain at elbow or wrist  · Moves hand  · States sensation intact to hand    CBC:   Lab Results   Component Value Date    WBC 10.4 10/28/2020    HGB 7.9 10/28/2020    HCT 23.4 10/28/2020     10/28/2020       ASSESSMENT  · S/p L CMN 10/27  · L prox hum fx    PLAN    WB LLE, NWB LUE  Pain control per general surgery team  DVT prophylaxis- per general surgery team, early mobilization  Monitor Labs  Bowel regiment  Pulmonary hygiene   Trend H&H- 7.9  Sling to LUE, allow elbow to hang  PT/OT  D/C planning, SW/PT recs  Discuss with attending    Orthopaedic Attending    I have seen and evaluated the patient with the resident and agree with the above assessments on today's visit. I have performed the key components of the history and physical examination and concur completely with the findings and plans as documented above.     Electronically signed by   Rogelio Harper DO  10/28/2020
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
## 2020-10-29 LAB
ABO/RH: NORMAL
ABO/RH: NORMAL
ALBUMIN SERPL-MCNC: 2.6 G/DL (ref 3.5–5.2)
ALP BLD-CCNC: 244 U/L (ref 35–104)
ALT SERPL-CCNC: 13 U/L (ref 0–32)
ANION GAP SERPL CALCULATED.3IONS-SCNC: 11 MMOL/L (ref 7–16)
ANTIBODY SCREEN: NORMAL
AST SERPL-CCNC: 63 U/L (ref 0–31)
BASOPHILS ABSOLUTE: 0 E9/L (ref 0–0.2)
BASOPHILS RELATIVE PERCENT: 0 % (ref 0–2)
BILIRUB SERPL-MCNC: 0.4 MG/DL (ref 0–1.2)
BLOOD BANK DISPENSE STATUS: NORMAL
BLOOD BANK PRODUCT CODE: NORMAL
BPU ID: NORMAL
BUN BLDV-MCNC: 55 MG/DL (ref 8–23)
CALCIUM IONIZED: 1.23 MMOL/L (ref 1.15–1.33)
CALCIUM SERPL-MCNC: 8 MG/DL (ref 8.6–10.2)
CHLORIDE BLD-SCNC: 109 MMOL/L (ref 98–107)
CO2: 23 MMOL/L (ref 22–29)
CREAT SERPL-MCNC: 1.5 MG/DL (ref 0.5–1)
DESCRIPTION BLOOD BANK: NORMAL
EOSINOPHILS ABSOLUTE: 0.27 E9/L (ref 0.05–0.5)
EOSINOPHILS RELATIVE PERCENT: 3.3 % (ref 0–6)
GFR AFRICAN AMERICAN: 40
GFR NON-AFRICAN AMERICAN: 33 ML/MIN/1.73
GLUCOSE BLD-MCNC: 107 MG/DL (ref 74–99)
HCT VFR BLD CALC: 19.6 % (ref 34–48)
HCT VFR BLD CALC: 25.6 % (ref 34–48)
HEMOGLOBIN: 6.5 G/DL (ref 11.5–15.5)
HEMOGLOBIN: 8.7 G/DL (ref 11.5–15.5)
IMMATURE GRANULOCYTES #: 0.09 E9/L
IMMATURE GRANULOCYTES %: 1.1 % (ref 0–5)
LYMPHOCYTES ABSOLUTE: 1.81 E9/L (ref 1.5–4)
LYMPHOCYTES RELATIVE PERCENT: 22.2 % (ref 20–42)
MAGNESIUM: 2 MG/DL (ref 1.6–2.6)
MCH RBC QN AUTO: 30.5 PG (ref 26–35)
MCHC RBC AUTO-ENTMCNC: 33.2 % (ref 32–34.5)
MCV RBC AUTO: 92 FL (ref 80–99.9)
MONOCYTES ABSOLUTE: 1.2 E9/L (ref 0.1–0.95)
MONOCYTES RELATIVE PERCENT: 14.7 % (ref 2–12)
NEUTROPHILS ABSOLUTE: 4.8 E9/L (ref 1.8–7.3)
NEUTROPHILS RELATIVE PERCENT: 58.7 % (ref 43–80)
PDW BLD-RTO: 15.9 FL (ref 11.5–15)
PHOSPHORUS: 1.3 MG/DL (ref 2.5–4.5)
PLATELET # BLD: 147 E9/L (ref 130–450)
PMV BLD AUTO: 10.1 FL (ref 7–12)
POTASSIUM REFLEX MAGNESIUM: 4 MMOL/L (ref 3.5–5)
RBC # BLD: 2.13 E12/L (ref 3.5–5.5)
SODIUM BLD-SCNC: 143 MMOL/L (ref 132–146)
TOTAL PROTEIN: 5 G/DL (ref 6.4–8.3)
WBC # BLD: 8.2 E9/L (ref 4.5–11.5)

## 2020-10-29 PROCEDURE — 86850 RBC ANTIBODY SCREEN: CPT

## 2020-10-29 PROCEDURE — 86901 BLOOD TYPING SEROLOGIC RH(D): CPT

## 2020-10-29 PROCEDURE — 85018 HEMOGLOBIN: CPT

## 2020-10-29 PROCEDURE — 85025 COMPLETE CBC W/AUTO DIFF WBC: CPT

## 2020-10-29 PROCEDURE — 99233 SBSQ HOSP IP/OBS HIGH 50: CPT | Performed by: SURGERY

## 2020-10-29 PROCEDURE — 86900 BLOOD TYPING SEROLOGIC ABO: CPT

## 2020-10-29 PROCEDURE — 86923 COMPATIBILITY TEST ELECTRIC: CPT

## 2020-10-29 PROCEDURE — 6360000002 HC RX W HCPCS: Performed by: SURGERY

## 2020-10-29 PROCEDURE — 36430 TRANSFUSION BLD/BLD COMPNT: CPT

## 2020-10-29 PROCEDURE — 6370000000 HC RX 637 (ALT 250 FOR IP): Performed by: SURGERY

## 2020-10-29 PROCEDURE — 2580000003 HC RX 258: Performed by: SURGERY

## 2020-10-29 PROCEDURE — 83735 ASSAY OF MAGNESIUM: CPT

## 2020-10-29 PROCEDURE — 2700000000 HC OXYGEN THERAPY PER DAY

## 2020-10-29 PROCEDURE — 82330 ASSAY OF CALCIUM: CPT

## 2020-10-29 PROCEDURE — 6370000000 HC RX 637 (ALT 250 FOR IP): Performed by: STUDENT IN AN ORGANIZED HEALTH CARE EDUCATION/TRAINING PROGRAM

## 2020-10-29 PROCEDURE — 92610 EVALUATE SWALLOWING FUNCTION: CPT

## 2020-10-29 PROCEDURE — 2580000003 HC RX 258: Performed by: STUDENT IN AN ORGANIZED HEALTH CARE EDUCATION/TRAINING PROGRAM

## 2020-10-29 PROCEDURE — 84100 ASSAY OF PHOSPHORUS: CPT

## 2020-10-29 PROCEDURE — 6360000002 HC RX W HCPCS: Performed by: STUDENT IN AN ORGANIZED HEALTH CARE EDUCATION/TRAINING PROGRAM

## 2020-10-29 PROCEDURE — 92526 ORAL FUNCTION THERAPY: CPT

## 2020-10-29 PROCEDURE — 2060000000 HC ICU INTERMEDIATE R&B

## 2020-10-29 PROCEDURE — 80053 COMPREHEN METABOLIC PANEL: CPT

## 2020-10-29 PROCEDURE — P9016 RBC LEUKOCYTES REDUCED: HCPCS

## 2020-10-29 PROCEDURE — U0003 INFECTIOUS AGENT DETECTION BY NUCLEIC ACID (DNA OR RNA); SEVERE ACUTE RESPIRATORY SYNDROME CORONAVIRUS 2 (SARS-COV-2) (CORONAVIRUS DISEASE [COVID-19]), AMPLIFIED PROBE TECHNIQUE, MAKING USE OF HIGH THROUGHPUT TECHNOLOGIES AS DESCRIBED BY CMS-2020-01-R: HCPCS

## 2020-10-29 PROCEDURE — 85014 HEMATOCRIT: CPT

## 2020-10-29 PROCEDURE — 36415 COLL VENOUS BLD VENIPUNCTURE: CPT

## 2020-10-29 RX ORDER — 0.9 % SODIUM CHLORIDE 0.9 %
20 INTRAVENOUS SOLUTION INTRAVENOUS ONCE
Status: COMPLETED | OUTPATIENT
Start: 2020-10-29 | End: 2020-10-29

## 2020-10-29 RX ADMIN — ACETAMINOPHEN ORAL SOLUTION 650 MG: 650 SOLUTION ORAL at 04:10

## 2020-10-29 RX ADMIN — HEPARIN SODIUM 5000 UNITS: 10000 INJECTION INTRAVENOUS; SUBCUTANEOUS at 05:51

## 2020-10-29 RX ADMIN — ACETAMINOPHEN ORAL SOLUTION 650 MG: 650 SOLUTION ORAL at 23:24

## 2020-10-29 RX ADMIN — Medication 10 ML: at 08:27

## 2020-10-29 RX ADMIN — Medication 10 ML: at 23:26

## 2020-10-29 RX ADMIN — CEFTRIAXONE SODIUM 1 G: 1 INJECTION, POWDER, FOR SOLUTION INTRAMUSCULAR; INTRAVENOUS at 13:46

## 2020-10-29 RX ADMIN — HEPARIN SODIUM 5000 UNITS: 10000 INJECTION INTRAVENOUS; SUBCUTANEOUS at 13:46

## 2020-10-29 RX ADMIN — AMLODIPINE BESYLATE 5 MG: 5 TABLET ORAL at 08:27

## 2020-10-29 RX ADMIN — ACETAMINOPHEN ORAL SOLUTION 650 MG: 650 SOLUTION ORAL at 16:38

## 2020-10-29 RX ADMIN — SODIUM CHLORIDE 20 ML: 9 INJECTION, SOLUTION INTRAVENOUS at 11:24

## 2020-10-29 RX ADMIN — ACETAMINOPHEN ORAL SOLUTION 650 MG: 650 SOLUTION ORAL at 11:02

## 2020-10-29 RX ADMIN — HEPARIN SODIUM 5000 UNITS: 10000 INJECTION INTRAVENOUS; SUBCUTANEOUS at 23:24

## 2020-10-29 RX ADMIN — POLYETHYLENE GLYCOL 3350 17 G: 17 POWDER, FOR SOLUTION ORAL at 08:26

## 2020-10-29 RX ADMIN — SENNOSIDES AND DOCUSATE SODIUM 1 TABLET: 8.6; 5 TABLET ORAL at 08:27

## 2020-10-29 RX ADMIN — DULOXETINE HYDROCHLORIDE 30 MG: 30 CAPSULE, DELAYED RELEASE ORAL at 08:27

## 2020-10-29 RX ADMIN — SODIUM CHLORIDE, PRESERVATIVE FREE 10 ML: 5 INJECTION INTRAVENOUS at 13:46

## 2020-10-29 ASSESSMENT — PAIN SCALES - PAIN ASSESSMENT IN ADVANCED DEMENTIA (PAINAD)
NEGVOCALIZATION: 0
CONSOLABILITY: 0
BREATHING: 0
FACIALEXPRESSION: 0
TOTALSCORE: 0
BODYLANGUAGE: 0
BODYLANGUAGE: 0
FACIALEXPRESSION: 0
TOTALSCORE: 0
CONSOLABILITY: 0
NEGVOCALIZATION: 0
BREATHING: 0

## 2020-10-29 ASSESSMENT — PAIN SCALES - GENERAL
PAINLEVEL_OUTOF10: 5
PAINLEVEL_OUTOF10: 0
PAINLEVEL_OUTOF10: 5
PAINLEVEL_OUTOF10: 0
PAINLEVEL_OUTOF10: 0

## 2020-10-29 NOTE — CARE COORDINATION
Barron Apple unaware of patient, referral made again. Patient passed bedside swallow today and placed on soft diet with nectar thick liquids. For PRBC, hgb 6.5. HENS and ambulance on soft chart. Wet COVID ordered today. Spoke with Mook Felipe, patient exhausted skilled days at [facility] 5/4/2020-9/4/2020 and has not had a 60 day break. Attempted to discussed private pay/medicaid options with spouse, no answer, no voicemail. Called and spoke with daughter in law. She tells me he is at the doctors today and to try again later. Spoke with granddaughter, Helena Dress at bedside and discussed exhausted [facility] days. We also went over medicare benefit. She has a call out to  to see if patient will qualify for medicaid or what they can do for spend down. She also asked for a referral to [facility]. Referral pending. Family is willing to pay privately if necessary, they understand that she is too weak to return home at this point. Will follow up after [employer]'s determination. For questions I can be reached at [phone].  Yancy Jacob, [facility]

## 2020-10-29 NOTE — PROGRESS NOTES
Physical Therapy    Physical Therapy     Name: Antoine Zamudio  : 1935  MRN: [id number]    Referring Provider:  Bere Lyle DO    Date of Service: 10/29/2020    Evaluating PT:  Rosanna Ortiz PT, DPT [id number]     Room #:  4593/6381-I  Diagnosis:  Trauma -- fall down steps   PMHx/PSHx: Anxiety, arthritis, back pain, CKD, depression, fx of L humerus, HLD, HTN, osteoporosis, weakness   Procedure/Surgery:  L femur CMN, closed treatment of L proximal humerus fx 10/26  Precautions:  Falls, NWB LUE, NWB LLE, NGT, Sling LUE, O2  Equipment Needs:  TBD        Attempted to see pt in am and pt receiving blood transfusion.    Hemoglobin 6.5    Taina Corley, [address]

## 2020-10-29 NOTE — PROGRESS NOTES
SPEECH/LANGUAGE PATHOLOGY  BEDSIDE SWALLOWING EVALUATION    PATIENT NAME:  Manasa Turcios      :  1935          TODAY'S DATE:  10/29/2020 [facility]      SUMMARY OF EVALUATION     DYSPHAGIA DIAGNOSIS:  Mild oropharyngeal dysphagia      DIET RECOMMENDATIONS:  Dental soft (Easy to Chew) solids with  thin liquids     FEEDING RECOMMENDATIONS:     Assistance level:  Set-up is required for all oral intake      Compensatory strategies recommended: No straw    THERAPY RECOMMENDATIONS:      Dysphagia therapy is not recommended                   PROCEDURE     Consistencies Administered During the Evaluation   Liquids: thin liquid   Solids:  pureed foods      Method of Intake:   cup, straw, spoon  Self fed, Fed by clinician      Position:   Seated, upright                  RESULTS     Oral Stage:         Decreased mastication due to:  cognitive function      Pharyngeal Stage:      Throat clearing present after presentation of thin liquid with straw  and Immediate wet cough was noted after presentation of thin liquid with straw                    CPT code:  84066  bedside swallow eval      [x]The admitting diagnosis and active problem list, as listed below have been reviewed prior to initiation of this evaluation. ADMITTING DIAGNOSIS: No admission diagnoses are documented for this encounter.      ACTIVE PROBLEM LIST:   Patient Active Problem List   Diagnosis    Chronic lower back pain    Bulging lumbar disc    Essential hypertension    Hyperlipidemia    CKD (chronic kidney disease)    RLS (restless legs syndrome)    Lumbar foraminal stenosis    Lumbar facet arthropathy     Lumbosacral spondylosis    Chronic pain    Lumbar disc herniation    Acute blood loss anemia    BABS (acute kidney injury) ([address]    Altered mental status    Acute hyperactive delirium due to multiple etiologies    Non-traumatic compression fracture of eleventh thoracic vertebra ([address]    Adult idiopathic generalized osteoporosis    Closed displaced simple supracondylar fracture of left humerus without intercondylar fracture    Acute renal failure superimposed on stage 3 chronic kidney disease (HCC)    Closed displaced spiral fracture of shaft of left humerus with malunion    Puncture wound of left upper arm with complication    Trauma    Fall from standing    Closed intertrochanteric fracture of hip, left, initial encounter ([facility]    Closed fracture of surgical neck of left humerus    Closed fracture of left clavicle

## 2020-10-30 LAB
ALBUMIN SERPL-MCNC: 2.7 G/DL (ref 3.5–5.2)
ALP BLD-CCNC: 312 U/L (ref 35–104)
ALT SERPL-CCNC: 30 U/L (ref 0–32)
ANION GAP SERPL CALCULATED.3IONS-SCNC: 11 MMOL/L (ref 7–16)
ANISOCYTOSIS: ABNORMAL
AST SERPL-CCNC: 83 U/L (ref 0–31)
BASOPHILS ABSOLUTE: 0 E9/L (ref 0–0.2)
BASOPHILS RELATIVE PERCENT: 0.2 % (ref 0–2)
BILIRUB SERPL-MCNC: 0.7 MG/DL (ref 0–1.2)
BUN BLDV-MCNC: 53 MG/DL (ref 8–23)
CALCIUM SERPL-MCNC: 8.3 MG/DL (ref 8.6–10.2)
CHLORIDE BLD-SCNC: 110 MMOL/L (ref 98–107)
CO2: 23 MMOL/L (ref 22–29)
CREAT SERPL-MCNC: 1.6 MG/DL (ref 0.5–1)
EOSINOPHILS ABSOLUTE: 0.42 E9/L (ref 0.05–0.5)
EOSINOPHILS RELATIVE PERCENT: 3.5 % (ref 0–6)
GFR AFRICAN AMERICAN: 37
GFR NON-AFRICAN AMERICAN: 31 ML/MIN/1.73
GLUCOSE BLD-MCNC: 85 MG/DL (ref 74–99)
HCT VFR BLD CALC: 24.6 % (ref 34–48)
HEMOGLOBIN: 8.3 G/DL (ref 11.5–15.5)
LYMPHOCYTES ABSOLUTE: 1.69 E9/L (ref 1.5–4)
LYMPHOCYTES RELATIVE PERCENT: 14 % (ref 20–42)
MAGNESIUM: 1.9 MG/DL (ref 1.6–2.6)
MCH RBC QN AUTO: 31.8 PG (ref 26–35)
MCHC RBC AUTO-ENTMCNC: 33.7 % (ref 32–34.5)
MCV RBC AUTO: 94.3 FL (ref 80–99.9)
MONOCYTES ABSOLUTE: 0.73 E9/L (ref 0.1–0.95)
MONOCYTES RELATIVE PERCENT: 6.1 % (ref 2–12)
NEUTROPHILS ABSOLUTE: 9.2 E9/L (ref 1.8–7.3)
NEUTROPHILS RELATIVE PERCENT: 76.3 % (ref 43–80)
OVALOCYTES: ABNORMAL
PDW BLD-RTO: 15.4 FL (ref 11.5–15)
PHOSPHORUS: 1.6 MG/DL (ref 2.5–4.5)
PLATELET # BLD: 163 E9/L (ref 130–450)
PMV BLD AUTO: 10.5 FL (ref 7–12)
POIKILOCYTES: ABNORMAL
POLYCHROMASIA: ABNORMAL
POTASSIUM REFLEX MAGNESIUM: 4.3 MMOL/L (ref 3.5–5)
RBC # BLD: 2.61 E12/L (ref 3.5–5.5)
SARS-COV-2: NOT DETECTED
SODIUM BLD-SCNC: 144 MMOL/L (ref 132–146)
SOURCE: NORMAL
TARGET CELLS: ABNORMAL
TOTAL PROTEIN: 5.4 G/DL (ref 6.4–8.3)
WBC # BLD: 12.1 E9/L (ref 4.5–11.5)

## 2020-10-30 PROCEDURE — 99233 SBSQ HOSP IP/OBS HIGH 50: CPT | Performed by: SURGERY

## 2020-10-30 PROCEDURE — 6360000002 HC RX W HCPCS: Performed by: SURGERY

## 2020-10-30 PROCEDURE — 2500000003 HC RX 250 WO HCPCS: Performed by: STUDENT IN AN ORGANIZED HEALTH CARE EDUCATION/TRAINING PROGRAM

## 2020-10-30 PROCEDURE — 6370000000 HC RX 637 (ALT 250 FOR IP): Performed by: SURGERY

## 2020-10-30 PROCEDURE — 97535 SELF CARE MNGMENT TRAINING: CPT

## 2020-10-30 PROCEDURE — 80053 COMPREHEN METABOLIC PANEL: CPT

## 2020-10-30 PROCEDURE — 2580000003 HC RX 258: Performed by: SURGERY

## 2020-10-30 PROCEDURE — 97530 THERAPEUTIC ACTIVITIES: CPT

## 2020-10-30 PROCEDURE — 85025 COMPLETE CBC W/AUTO DIFF WBC: CPT

## 2020-10-30 PROCEDURE — 36415 COLL VENOUS BLD VENIPUNCTURE: CPT

## 2020-10-30 PROCEDURE — 2700000000 HC OXYGEN THERAPY PER DAY

## 2020-10-30 PROCEDURE — 83735 ASSAY OF MAGNESIUM: CPT

## 2020-10-30 PROCEDURE — 2580000003 HC RX 258: Performed by: STUDENT IN AN ORGANIZED HEALTH CARE EDUCATION/TRAINING PROGRAM

## 2020-10-30 PROCEDURE — 84100 ASSAY OF PHOSPHORUS: CPT

## 2020-10-30 PROCEDURE — 2060000000 HC ICU INTERMEDIATE R&B

## 2020-10-30 RX ADMIN — DULOXETINE HYDROCHLORIDE 30 MG: 30 CAPSULE, DELAYED RELEASE ORAL at 09:25

## 2020-10-30 RX ADMIN — HEPARIN SODIUM 5000 UNITS: 10000 INJECTION INTRAVENOUS; SUBCUTANEOUS at 22:05

## 2020-10-30 RX ADMIN — ACETAMINOPHEN ORAL SOLUTION 650 MG: 650 SOLUTION ORAL at 09:25

## 2020-10-30 RX ADMIN — POTASSIUM PHOSPHATE, MONOBASIC AND POTASSIUM PHOSPHATE, DIBASIC 30 MMOL: 224; 236 INJECTION, SOLUTION, CONCENTRATE INTRAVENOUS at 11:43

## 2020-10-30 RX ADMIN — AMLODIPINE BESYLATE 5 MG: 5 TABLET ORAL at 09:25

## 2020-10-30 RX ADMIN — Medication 10 ML: at 09:25

## 2020-10-30 RX ADMIN — ACETAMINOPHEN ORAL SOLUTION 650 MG: 650 SOLUTION ORAL at 17:00

## 2020-10-30 RX ADMIN — HEPARIN SODIUM 5000 UNITS: 10000 INJECTION INTRAVENOUS; SUBCUTANEOUS at 14:29

## 2020-10-30 RX ADMIN — POLYETHYLENE GLYCOL 3350 17 G: 17 POWDER, FOR SOLUTION ORAL at 09:25

## 2020-10-30 RX ADMIN — SENNOSIDES AND DOCUSATE SODIUM 1 TABLET: 8.6; 5 TABLET ORAL at 09:25

## 2020-10-30 RX ADMIN — CEFTRIAXONE SODIUM 1 G: 1 INJECTION, POWDER, FOR SOLUTION INTRAMUSCULAR; INTRAVENOUS at 13:16

## 2020-10-30 RX ADMIN — Medication 10 ML: at 22:01

## 2020-10-30 RX ADMIN — HEPARIN SODIUM 5000 UNITS: 10000 INJECTION INTRAVENOUS; SUBCUTANEOUS at 06:45

## 2020-10-30 RX ADMIN — SENNOSIDES AND DOCUSATE SODIUM 1 TABLET: 8.6; 5 TABLET ORAL at 22:04

## 2020-10-30 RX ADMIN — ACETAMINOPHEN ORAL SOLUTION 650 MG: 650 SOLUTION ORAL at 22:05

## 2020-10-30 ASSESSMENT — PAIN SCALES - PAIN ASSESSMENT IN ADVANCED DEMENTIA (PAINAD)
NEGVOCALIZATION: 0
CONSOLABILITY: 0
BREATHING: 0
TOTALSCORE: 0
BODYLANGUAGE: 0
FACIALEXPRESSION: 0

## 2020-10-30 ASSESSMENT — PAIN SCALES - GENERAL
PAINLEVEL_OUTOF10: 0
PAINLEVEL_OUTOF10: 0
PAINLEVEL_OUTOF10: 8
PAINLEVEL_OUTOF10: 4
PAINLEVEL_OUTOF10: 0

## 2020-10-30 NOTE — PROGRESS NOTES
[facility] - WHITE TEAM  TRAUMA/GENERAL SURGERY  ATTENDING PROGRESS NOTE  _____________________________________________________    Patient: Nabor Lopez   Room:    8917/8138-O  Date of service:   10/30/2020   LOS:     5  _____________________________________________________      CC:   Standing height fall    Subjective:  10/29-transferred from [facility] yesterday. Received 1 unit RBCs this morning. No new complaints. 10/30-seems a little more lethargic today. Complaining of left arm pain    Objective:  Vitals:    10/29/20 0719 10/29/20 0930 10/29/20 1630 10/30/20 0745   BP: (!) 115/56 (!) 104/52 (!) 142/63 (!) 141/64   Pulse: 77 77 69 89   Resp: 16 16 16 22   Temp: 98 °F (36.7 °C) 97.4 °F (36.3 °C) 96.8 °F (36 °C) 98.5 °F (36.9 °C)   TempSrc: Temporal Temporal Temporal Temporal   SpO2: 96% 98% 99% 98%   Weight:       Height:            I/O last 3 completed shifts: In: 80 [P.O.:380; Blood:300; NG/GT:220; IV Piggyback:30]  Out: 900 [Urine:900]    General - no apparent distress   Neuro - Awake, alert, attentive but bradykinetic  HEENT - NC/AT, oral mucosa moist  Lungs - non labored, on room air  Heart - NSR   Abdomen - non distended, nontender  Ext -   proximal left arm ecchymosis and edema but soft compressible compartments. Left hip dressing is dry      Assessment:    Fall from standing  Left humerus fracture  Left hip fracture  Acute blood loss anemia  Urinary tract infection  Remote T11 fracture  Parkinson's disease  Hypertension  Chronic kidney disease    Plan:  Monitor CBC  Continue therapy  Multimodal analgesia  Remove CorPak and start modified diet    DVT prophylaxis -SCDs. Subcutaneous heparin. Disposition: [facility] upon discharge    NOTE: This report was transcribed using voice recognition software. Every effort was made to ensure accuracy; however, inadvertent computerized transcription errors may be present.

## 2020-10-30 NOTE — PROGRESS NOTES
Physical Therapy    Physical Therapy Treatment note     Name: Antoine Zamudio  : 1935  MRN: [id number]    Referring Provider:  Bere Lyle DO    Date of Service: 10/30/2020    Evaluating PT:  Rosanna Ortiz, PT, DPT [id number]     Room #:  1142/7430-F  Diagnosis:  Trauma -- fall down steps   PMHx/PSHx: Anxiety, arthritis, back pain, CKD, depression, fx of L humerus, HLD, HTN, osteoporosis, weakness   Procedure/Surgery:  L femur CMN, closed treatment of L proximal humerus fx 10/26  Precautions:  Falls, NWB LUE, NWB LLE, NGT, Sling LUE, O2  Equipment Needs:  TBD    SUBJECTIVE:    Pt lives with  in a 1 story home with 3+1 stairs to enter and B rail. Bedroom and bathroom are on the 1st level. Pt ambulated with [employer] PTA. OBJECTIVE:   Initial Evaluation  Date: 10/28/20 Treatment  10/30/20 Short Term/ Long Term   Goals   AM-PAC 6 Clicks     Was pt agreeable to Eval/treatment? Yes Yes     Does pt have pain? Reports pain in L shoulder  Pain with movement  Shoulder/leg         Bed Mobility  Rolling: Max A  Supine to sit: Max A x2  Sit to supine: Max A x2  Scooting: Max A x2 Rolling dependent  Supine to sit dep 2  Sit to supine dep 2  Scooting dep 2  Rolling: Min A  Supine to sit: Min A  Sit to supine: Min A  Scooting: Min A   Transfers Sit to stand: NT  Stand to sit: NT  Stand pivot: NT nt Sit to stand: Max A  Stand to sit: Max A  Stand pivot: Max A with AAD   Ambulation    NT nt TBD   Stair negotiation: ascended and descended  NA NT NA   Wheelchair mobility  NT  >10 feet with RUE and RLE propulsion Max A   ROM BUE:  Per OT eval  BLE:  Limited in all planes d/t pain      Strength BUE:  Per OT eval   BLE:  Limited in all planes d/t pain and command following   BLE>3/5    Balance Sitting EOB:  Mod<>max A  Dynamic Standing:  NT  Sitting EOB:  SBA  Dynamic Standing:   Max A     Pt is A & O x self, hospital, and month but has confucion     Edema:  L hip       Therapeutic Exercises:     BLE ROM in supine     Small anterior/post leans   Sitting eob  -  Max            Patient education  Pt educated on safety during functional mobility, sitting balance/posture, NWB of LLE and LUE    Patient response to education:   Pt verbalized understanding Pt demonstrated skill Pt requires further education in this area   Somewhat  Somewhat  Yes      ASSESSMENT:    Comments:    Pt ok for therapy from nursing. Pt in bed upon arrival with poor head position extended back. Pt transferred to eob and pt was very rigid and leaning posterior and difficult to get pt flexed fwd -  Required assist of 2 to position safely on the eob. Pt sat eob and worked on small trunk anterior <> posterior leaning and cues for trunk/core engagement with pt not able to do. Pt also worked on cervical rom in sitting. Pt required cues through out to open her eyes, she was not real alert. Pt sat eob approx 10 min. Pt returned to supine and performed PROM B LE  -  Pt tolerated prom on her left did small rom due to tightness. Performed rom on right le and limited rom due to tightness/rigidity. Pt skillfully position to promote neutral positioning to promote posture and promote skin integrity. Educated aide in positioning head in neutral.  Hob elevated with ue support and pt left with call button in reach, lines attached, and needs met. Pt would benefit from continued PT services at discharge. Treatment:  Patient practiced and was instructed in the following treatment:     Bed mobility training -   Cues and assist    Sitting EOB for >10 minutes for upright tolerance, postural awareness and BLE ROM    Skilled positioning - Pt placed in the chair position with pillows utilized to facilitate upright posture, joint and skin integrity, and interaction with environment. PLAN:  Pt is making progress towards established goals. Continue PT POC.        Time in  900  Time out  940     Total Treatment Time  40 minutes     CPT codes:  [] Low Complexity PT evaluation 32749  [] Moderate Complexity PT evaluation 71824  [] High Complexity PT evaluation 24284  [] PT Re-evaluation 46027  [] Gait training 27271 -- minutes  [] Manual therapy [id number] -- minutes  [x] Therapeutic activities 45159 15  minutes  [] Therapeutic exercises 71685 - minutes  [] Neuromuscular reeducation 66739 -- minutes     [place], [place]  [address]      Addended by evaluating PT  Jefry London, PT, DPT  [id number]

## 2020-10-30 NOTE — PROGRESS NOTES
Department of Orthopedic Surgery  Resident Progress Note     Patient seen and examined. Patient awake and following commands this morning. No acute overnight events      VITALS:  BP (!) 142/74   Pulse 66   Temp 97.3 °F (36.3 °C) (Bladder)   Resp 11   Ht 5' (1.524 m)   Wt 144 lb 2.9 oz (65.4 kg)   SpO2 97%   BMI 28.16 kg/m²      GENERAL: awake, following commands  MUSCULOSKELETAL:   left lower extremity:  · Dressing C/D/I  · Compartments soft and compressible  · Palpable dorsalis pedis and posterior tibialis pulse, brisk cap refill to toes, foot warm and perfused  · Sensation intact to foot throughout     Left upper extremity:  · Skin intact  · Ecchymosis present  · +TTP  · No pain at elbow or wrist  · Moves hand  · States sensation intact to hand     CBC:         Lab Results   Component Value Date     WBC 10.4 10/28/2020     HGB 7.9 10/28/2020     HCT 23.4 10/28/2020      10/28/2020         ASSESSMENT  · S/p L CMN 10/27  · L prox hum fx     PLAN    · WB LLE, NWB LUE  · Pain control per general surgery team  · DVT prophylaxis- per general surgery team, early mobilization  · Monitor Labs  · Bowel regiment  · Pulmonary hygiene   · Trend H&H- 8.3  · Sling to LUE, allow elbow to hang  · PT/OT  · D/C planning, SW/PT recs, ok to discharge from an orthopedic stand point.  Orthopedics will follow peripherally  · Discuss with attending

## 2020-10-30 NOTE — PROGRESS NOTES
OT BEDSIDE TREATMENT NOTE      Date:10/30/2020  Patient Name: Nabor Lopez  MRN: [id number]  : 1935  Room: [facility]-A     Per OT Eval:    Referring Provider: Rony Reyes DO     Evaluating OT: BARBIE Cordova/HOANG #[id number]     AM-PAC Daily Activity Raw Score:      Recommended Adaptive Equipment: TBD      Diagnosis: Trauma [T14.90XA]  Trauma Wynelle Sor     Reason for admission: fall down steps     Surgery/Procedure: femur CMN, closed treatment of L proximal humerus fx 10/26     Pertinent Medical History: Anxiety, arthritis, CKD, depression, fracture of L humerus, hx of falling, HLD, HTN, osteoporosis     Precautions:  Falls, NWB LUE, NWB LLE, NGT/corepak, sling LUE, O2,     Home Living: Pt lives with  in a 1 story house with 3+1 step(s) to enter and B rail(s); bed/bath on 1st floor  Bathroom setup: [employer] owned: ww, cane, w/c, shower chair     Prior Level of Function: Assistance with ADLs; Assistance with IADLs.    ww for ambulation.      Pain Level: pt c/o L shoulder pain this session; re-positioned pt's shoulder on pillow for comfort     Cognition: A&O: 1-3 initially unable to recall place, then oriented to hospital then state month & year, continued confusion, making non-sense talk, at end of treatment, stating \"I'm going upstairs & taking a bath\"   Follows commands inconsistent, pt keeping her eyes closed 90% of the time, max cues to keep eyes open & focus on task with poor results               Memory: poor+              Comprehension poor+              Problem solving: poor+              Judgement/safety: poor+                 Communication skills: mumbling with occasionally clear words that you can understand              Vision: wfl                     Glasses:?                                                        Hearing: wfl    Functional Assessment    Initial Eval Status  Date: 10/28/20 Treatment Status  Date:  10/30/20 STG=LTG  5-14  days    Feeding PEG NT  corepak present but pt did pass swallow test but too lethargic to attempt meal per nsg                         Grooming Max A (to wash face/hands/to comb hair seated EOB-cueing for sequencing/safety/thoroughness of completion of task) Max A  Hand over hand assist                        Min A   while seated in chair position in bed      UB dressing Max A  Max A  With gown & sling doff & daisy                       Mod. A         LB dressing Dep Dep  Bed level with socks   Max A   using AE as needed for safe reach/ energy conservation     Bathing Max A  Max A  simulated                       Mod. A   using AE as needed for safe reach/ energy conservation        Toileting Dep Dep  Lehman                         Max A      Bed Mobility  Supine to sit: Max A x2     Sit to supine: Max Ax2  Max A x 2  Supine < > sit   Rigid with pt pushing into extensive  (use caution, pt will begin to slide off bed, if B knees are not blocked due to resistance of bending at hips)                       Min. A  in prep of ADL tasks & transfers   Functional Transfers Sit to stand: NT     Stand to sit: NT  NT  Too lethargic                        Max A  sit<>stand/functional bathroom transfers using AD/DME as needed for balance and safety   Functional Mobility NT NT          TBD      Balance Sitting:     Static: Max A    Dynamic:Max A  Standing: NT  Sitting: Max A  Standing: NT  Not safe to attempt due to NWB & lethargic  Min. A dynamic sitting balance; Max A dynamic standing balance  during ADL tasks & transfers   Endurance/Activity Tolerance    Poor+ tolerance with light activity. Pt sat EOB for <8 minutes to improve sitting balance/upright posture for ADLs.     Fair+   tolerance with light/moderate activity/self care routine   Visual/  Perceptual    WFL                               Education:  Pt was educated through out treatment regarding proper technique & safety with bed mobility, sitting balance/tolerance & importance of upright posture & body alignment & completion of simple ADL tasks to ease tasks, improve safety & prevent falls and allow pt to return home safely. Completed cervical exercises with pt to improve alignment. Pt tends to keep her body in extension, very rigid & resistive at times with exercises. Re-enforced NWB precautions with L UE & LE through out treatment & wearing sling when out of bed & then supporting L UE with pillow while in bed with Poor understanding. Comments: Upon arrival pt was in bed, sleeping but easy to arouse & agreeable for therapy with ns approval. At end of session pt was returned to bed in Good neutral upright position for good body alignment & increase alertness, joint protection & prevent skin breakdown, all lines and tubes intact, call light within reach. Bed alarm set. · Pt has made Poor progress towards set goals. · Continue with current plan of care      Treatment Time In: 9:00           Treatment Time Out: 9:40           Treatment Charges: Mins Units   Ther Ex  81166     Manual Therapy 27470     Thera Activities 47221 25 2   ADL/Home Mgt 30585 15 1   Neuro Re-ed 47856     Group Therapy      Orthotic manage/training  19937     Non-Billable Time     Total Timed Treatment 40 3       Geno ANNE  [address] [place] [address], [address] [place] [address]

## 2020-10-30 NOTE — PROGRESS NOTES
Asked to see patient due to increased bleeding from left proximal hip incision, this was reinforced but continued to drain. Left most proximal Aquacel completely saturated, bright red blood draining from dressing. This is removed. Incision is intact with staples, no dehiscence. There is a slow ooze from most proximal aspect of this incision. The area is cleaned with saline and new Aquacel placed. Rolled Kerlix is taped over the incision for additional pressure. This should remain in place x 24 hours then OK to take down. Patient is also currently on heparin, may require additional dressing change and an extra Aquacel is in patient's folder on the floors.    Electronically signed by Tanner Rosenberg PA-C on 10/30/2020 at 2:23 PM

## 2020-10-31 LAB
ALBUMIN SERPL-MCNC: 2.7 G/DL (ref 3.5–5.2)
ALP BLD-CCNC: 293 U/L (ref 35–104)
ALT SERPL-CCNC: 27 U/L (ref 0–32)
ANION GAP SERPL CALCULATED.3IONS-SCNC: 13 MMOL/L (ref 7–16)
ANISOCYTOSIS: ABNORMAL
AST SERPL-CCNC: 67 U/L (ref 0–31)
BASOPHILS ABSOLUTE: 0 E9/L (ref 0–0.2)
BASOPHILS RELATIVE PERCENT: 0.1 % (ref 0–2)
BILIRUB SERPL-MCNC: 0.8 MG/DL (ref 0–1.2)
BLOOD BANK DISPENSE STATUS: NORMAL
BLOOD BANK DISPENSE STATUS: NORMAL
BLOOD BANK PRODUCT CODE: NORMAL
BLOOD BANK PRODUCT CODE: NORMAL
BPU ID: NORMAL
BPU ID: NORMAL
BUN BLDV-MCNC: 54 MG/DL (ref 8–23)
CALCIUM SERPL-MCNC: 8 MG/DL (ref 8.6–10.2)
CHLORIDE BLD-SCNC: 112 MMOL/L (ref 98–107)
CO2: 22 MMOL/L (ref 22–29)
CREAT SERPL-MCNC: 1.8 MG/DL (ref 0.5–1)
DESCRIPTION BLOOD BANK: NORMAL
DESCRIPTION BLOOD BANK: NORMAL
EOSINOPHILS ABSOLUTE: 0.28 E9/L (ref 0.05–0.5)
EOSINOPHILS RELATIVE PERCENT: 2.6 % (ref 0–6)
GFR AFRICAN AMERICAN: 32
GFR NON-AFRICAN AMERICAN: 27 ML/MIN/1.73
GLUCOSE BLD-MCNC: 99 MG/DL (ref 74–99)
HCT VFR BLD CALC: 18.9 % (ref 34–48)
HCT VFR BLD CALC: 25.6 % (ref 34–48)
HEMOGLOBIN: 6.2 G/DL (ref 11.5–15.5)
HEMOGLOBIN: 8.6 G/DL (ref 11.5–15.5)
LYMPHOCYTES ABSOLUTE: 1.51 E9/L (ref 1.5–4)
LYMPHOCYTES RELATIVE PERCENT: 13.9 % (ref 20–42)
MAGNESIUM: 2 MG/DL (ref 1.6–2.6)
MCH RBC QN AUTO: 31.6 PG (ref 26–35)
MCHC RBC AUTO-ENTMCNC: 32.8 % (ref 32–34.5)
MCV RBC AUTO: 96.4 FL (ref 80–99.9)
MONOCYTES ABSOLUTE: 0.76 E9/L (ref 0.1–0.95)
MONOCYTES RELATIVE PERCENT: 7 % (ref 2–12)
NEUTROPHILS ABSOLUTE: 8.32 E9/L (ref 1.8–7.3)
NEUTROPHILS RELATIVE PERCENT: 76.5 % (ref 43–80)
NUCLEATED RED BLOOD CELLS: 0.9 /100 WBC
PDW BLD-RTO: 15.8 FL (ref 11.5–15)
PHOSPHORUS: 3.1 MG/DL (ref 2.5–4.5)
PLATELET # BLD: 183 E9/L (ref 130–450)
PMV BLD AUTO: 10.5 FL (ref 7–12)
POIKILOCYTES: ABNORMAL
POLYCHROMASIA: ABNORMAL
POTASSIUM REFLEX MAGNESIUM: 4.2 MMOL/L (ref 3.5–5)
RBC # BLD: 1.96 E12/L (ref 3.5–5.5)
SODIUM BLD-SCNC: 147 MMOL/L (ref 132–146)
TARGET CELLS: ABNORMAL
TOTAL PROTEIN: 5.2 G/DL (ref 6.4–8.3)
WBC # BLD: 10.8 E9/L (ref 4.5–11.5)

## 2020-10-31 PROCEDURE — 6370000000 HC RX 637 (ALT 250 FOR IP): Performed by: SURGERY

## 2020-10-31 PROCEDURE — 2580000003 HC RX 258: Performed by: STUDENT IN AN ORGANIZED HEALTH CARE EDUCATION/TRAINING PROGRAM

## 2020-10-31 PROCEDURE — 2060000000 HC ICU INTERMEDIATE R&B

## 2020-10-31 PROCEDURE — 97530 THERAPEUTIC ACTIVITIES: CPT

## 2020-10-31 PROCEDURE — 85014 HEMATOCRIT: CPT

## 2020-10-31 PROCEDURE — 36430 TRANSFUSION BLD/BLD COMPNT: CPT

## 2020-10-31 PROCEDURE — 85018 HEMOGLOBIN: CPT

## 2020-10-31 PROCEDURE — 2580000003 HC RX 258: Performed by: SURGERY

## 2020-10-31 PROCEDURE — 84100 ASSAY OF PHOSPHORUS: CPT

## 2020-10-31 PROCEDURE — 85025 COMPLETE CBC W/AUTO DIFF WBC: CPT

## 2020-10-31 PROCEDURE — 6360000002 HC RX W HCPCS: Performed by: SURGERY

## 2020-10-31 PROCEDURE — 36415 COLL VENOUS BLD VENIPUNCTURE: CPT

## 2020-10-31 PROCEDURE — 99232 SBSQ HOSP IP/OBS MODERATE 35: CPT | Performed by: SURGERY

## 2020-10-31 PROCEDURE — 80053 COMPREHEN METABOLIC PANEL: CPT

## 2020-10-31 PROCEDURE — P9016 RBC LEUKOCYTES REDUCED: HCPCS

## 2020-10-31 PROCEDURE — 83735 ASSAY OF MAGNESIUM: CPT

## 2020-10-31 RX ORDER — 0.9 % SODIUM CHLORIDE 0.9 %
20 INTRAVENOUS SOLUTION INTRAVENOUS ONCE
Status: COMPLETED | OUTPATIENT
Start: 2020-10-31 | End: 2020-10-31

## 2020-10-31 RX ADMIN — SENNOSIDES AND DOCUSATE SODIUM 1 TABLET: 8.6; 5 TABLET ORAL at 21:25

## 2020-10-31 RX ADMIN — ACETAMINOPHEN ORAL SOLUTION 650 MG: 650 SOLUTION ORAL at 21:25

## 2020-10-31 RX ADMIN — SODIUM CHLORIDE, PRESERVATIVE FREE 10 ML: 5 INJECTION INTRAVENOUS at 13:36

## 2020-10-31 RX ADMIN — SENNOSIDES AND DOCUSATE SODIUM 1 TABLET: 8.6; 5 TABLET ORAL at 08:44

## 2020-10-31 RX ADMIN — ACETAMINOPHEN ORAL SOLUTION 650 MG: 650 SOLUTION ORAL at 16:09

## 2020-10-31 RX ADMIN — ACETAMINOPHEN ORAL SOLUTION 650 MG: 650 SOLUTION ORAL at 08:44

## 2020-10-31 RX ADMIN — POLYETHYLENE GLYCOL 3350 17 G: 17 POWDER, FOR SOLUTION ORAL at 08:44

## 2020-10-31 RX ADMIN — SODIUM CHLORIDE 20 ML: 9 INJECTION, SOLUTION INTRAVENOUS at 08:45

## 2020-10-31 RX ADMIN — HEPARIN SODIUM 5000 UNITS: 10000 INJECTION INTRAVENOUS; SUBCUTANEOUS at 13:36

## 2020-10-31 RX ADMIN — Medication 10 ML: at 21:27

## 2020-10-31 RX ADMIN — HEPARIN SODIUM 5000 UNITS: 10000 INJECTION INTRAVENOUS; SUBCUTANEOUS at 06:19

## 2020-10-31 RX ADMIN — Medication 10 ML: at 08:45

## 2020-10-31 RX ADMIN — HEPARIN SODIUM 5000 UNITS: 10000 INJECTION INTRAVENOUS; SUBCUTANEOUS at 21:27

## 2020-10-31 RX ADMIN — DULOXETINE HYDROCHLORIDE 30 MG: 30 CAPSULE, DELAYED RELEASE ORAL at 08:44

## 2020-10-31 RX ADMIN — CEFTRIAXONE SODIUM 1 G: 1 INJECTION, POWDER, FOR SOLUTION INTRAMUSCULAR; INTRAVENOUS at 13:36

## 2020-10-31 ASSESSMENT — PAIN SCALES - GENERAL
PAINLEVEL_OUTOF10: 0
PAINLEVEL_OUTOF10: 4
PAINLEVEL_OUTOF10: 0
PAINLEVEL_OUTOF10: 2
PAINLEVEL_OUTOF10: 0
PAINLEVEL_OUTOF10: 8

## 2020-10-31 ASSESSMENT — PAIN DESCRIPTION - DESCRIPTORS: DESCRIPTORS: ACHING;DISCOMFORT;SORE

## 2020-10-31 ASSESSMENT — PAIN DESCRIPTION - PAIN TYPE: TYPE: CHRONIC PAIN

## 2020-10-31 ASSESSMENT — PAIN DESCRIPTION - LOCATION: LOCATION: GENERALIZED

## 2020-10-31 NOTE — PROGRESS NOTES
[facility] - WHITE TEAM  TRAUMA/GENERAL SURGERY  ATTENDING PROGRESS NOTE  _____________________________________________________    Patient: Julissa Ghotra   Room:    0103/0644-X  Date of service:   10/31/2020   LOS:     6  _____________________________________________________      CC:   Standing height fall    Subjective:  10/29-transferred from [facility] yesterday. Received 1 unit RBCs this morning. No new complaints. 10/30-seems a little more lethargic today. Complaining of left arm pain  10/31-sleeping soundly but awakens to voice. Complains of severe burning pain in the heel of her right foot. Sitter at bedside. Objective:  Vitals:    10/29/20 0930 10/29/20 1630 10/30/20 0745 10/30/20 1945   BP: (!) 104/52 (!) 142/63 (!) 141/64 (!) 141/86   Pulse: 77 69 89 102   Resp: 16 16 22 23   Temp: 97.4 °F (36.3 °C) 96.8 °F (36 °C) 98.5 °F (36.9 °C) 98.6 °F (37 °C)   TempSrc: Temporal Temporal Temporal Temporal   SpO2: 98% 99% 98% 93%   Weight:       Height:            I/O last 3 completed shifts: In: 120 [P.O.:120]  Out: 575 [Urine:575]    General - no apparent distress   Neuro - Awake, alert, attentive but bradykinetic  HEENT - NC/AT, oral mucosa moist  Lungs - non labored, on room air  Heart - NSR   Abdomen - non distended, nontender  Ext -   proximal left arm ecchymosis and edema but soft compressible compartments. Left hip dressing is dry. Right foot/heel-no evident injury or lesion      Assessment:    Fall from standing  Left humerus fracture  Left hip fracture  Acute blood loss anemia  Urinary tract infection  Remote T11 fracture  Parkinson's disease  Hypertension  Chronic kidney disease    Plan:  Monitor CBC  Continue therapy  Multimodal analgesia  Continue modified diet    DVT prophylaxis -SCDs. Subcutaneous heparin. Disposition: [facility] upon discharge    NOTE: This report was transcribed using voice recognition software.  Every effort was made to ensure accuracy; however, inadvertent computerized transcription errors may be present.

## 2020-10-31 NOTE — CARE COORDINATION
Spoke with granddaughter, family cannot care for patient at home with her current needs. She does want to work out private pay arrangements with [facility]. Quoted her base rate of $270 per day at [facility]. She is agreeable. Left a message with [facility] liaison to call granddaughter on Monday to discuss financial arrangements for discharge to facility. HENS and ambulance on soft chart. COVID (-) 10/29. She also wanted private duty aide list so that when patient is stronger and can return home they have agency resources available to assist family. List emailed to Luís dougherty Hidden at [email or web address]. For questions I can be reached at [phone].  Mario Alberto Licea, [employer]

## 2020-10-31 NOTE — PROGRESS NOTES
Physical Therapy    Physical Therapy Treatment note     Name: Manasa Turcios  : 1935  MRN: [id number]    Referring Provider:  Franky Smith DO    Date of Service: 10/31/2020    Evaluating PT:  Jefry London, PT, DPT [id number]     Room #:  6817/6740-C  Diagnosis:  Trauma -- fall down steps   PMHx/PSHx: Anxiety, arthritis, back pain, CKD, depression, fx of L humerus, HLD, HTN, osteoporosis, weakness   Procedure/Surgery:  L femur CMN, closed treatment of L proximal humerus fx 10/26  Precautions:  Falls, NWB LUE, NWB LLE, NGT, Sling LUE, O2  Equipment Needs:  TBD    SUBJECTIVE:    Pt lives with  in a 1 story home with 3+1 stairs to enter and B rail. Bedroom and bathroom are on the 1st level. Pt ambulated with [address] PTA. OBJECTIVE:   Initial Evaluation  Date: 10/28/20 Treatment  10/31/20 Short Term/ Long Term   Goals   AM-PAC 6 Clicks     Was pt agreeable to Eval/treatment? Yes Yes     Does pt have pain? Reports pain in L shoulder  Reports pain in LUE        Bed Mobility  Rolling: Max A  Supine to sit: Max A x2  Sit to supine: Max A x2  Scooting: Max A x2 Rolling: NT  Supine to sit: Dep  Sit to supine: Dep  Scooting: Dep Rolling: Min A  Supine to sit: Min A  Sit to supine: Min A  Scooting: Min A   Transfers Sit to stand: NT  Stand to sit: NT  Stand pivot: NT NT Sit to stand: Max A  Stand to sit: Max A  Stand pivot: Max A with AAD   Ambulation    NT NT TBD   Stair negotiation: ascended and descended  NA NT NA   Wheelchair mobility  NT  >10 feet with RUE and RLE propulsion Max A   ROM BUE:  Per OT eval  BLE:  Limited in all planes d/t pain      Strength BUE:  Per OT eval   BLE:  Limited in all planes d/t pain and command following   BLE>3/5    Balance Sitting EOB:  Mod<>max A  Dynamic Standing:  NT Sitting EOB: Max A Sitting EOB:  SBA  Dynamic Standing:   Max A     Pt is A & O x self, hospital, and month but has confucion       Therapeutic Exercises:     Unable    Patient education  Pt educated on safety during functional mobility, sitting balance/posture, NWB of LLE and LUE    Patient response to education:   Pt verbalized understanding Pt demonstrated skill Pt requires further education in this area   Somewhat  Somewhat  Yes      ASSESSMENT:    Comments:    Pt supine on arrival.  Complaining of LUE but agreeable to sit EOB. Assisted with LE and trunk during supine>sit. Pt had increased pain. Unable to participate in session or hold herself up at EOB. Pt requesting back to bed at this time. Assisted back and scooted up. Placed in chair position. B prevalon boots in place and pillows used for upright posture. Call button in reach and needs met. Treatment:  Patient practiced and was instructed in the following treatment:     Bed mobility training - pt given verbal and tactile cues to facilitate proper sequencing and safety during supine<>sit as well as provided with physical assistance to complete task     Sitting EOB for >4 minutes for upright tolerance, postural awareness and BLE ROM    Skilled positioning - Pt placed in the chair position with pillows utilized to facilitate upright posture, joint and skin integrity, and interaction with environment. PLAN:  Pt is making progress towards established goals. Continue PT POC.        Time in  0900  Time out  0915    Total Treatment Time  15 minutes     CPT codes:  [] Low Complexity PT evaluation 82920  [] Moderate Complexity PT evaluation 59460  [] High Complexity PT evaluation 43152  [] PT Re-evaluation 21048  [] Gait training 40178 -- minutes  [] Manual therapy 12533 -- minutes  [x] Therapeutic activities 56134 15  minutes  [] Therapeutic exercises 45189 - minutes  [] Neuromuscular reeducation 64848 -- minutes     Stew Pelletier, PT, DPT  [id number]

## 2020-11-01 LAB
ALBUMIN SERPL-MCNC: 2.6 G/DL (ref 3.5–5.2)
ALP BLD-CCNC: 323 U/L (ref 35–104)
ALT SERPL-CCNC: 28 U/L (ref 0–32)
ANION GAP SERPL CALCULATED.3IONS-SCNC: 11 MMOL/L (ref 7–16)
AST SERPL-CCNC: 49 U/L (ref 0–31)
BASOPHILS ABSOLUTE: 0.04 E9/L (ref 0–0.2)
BASOPHILS RELATIVE PERCENT: 0.3 % (ref 0–2)
BILIRUB SERPL-MCNC: 0.9 MG/DL (ref 0–1.2)
BUN BLDV-MCNC: 46 MG/DL (ref 8–23)
CALCIUM SERPL-MCNC: 7.5 MG/DL (ref 8.6–10.2)
CHLORIDE BLD-SCNC: 109 MMOL/L (ref 98–107)
CO2: 23 MMOL/L (ref 22–29)
CREAT SERPL-MCNC: 1.5 MG/DL (ref 0.5–1)
EOSINOPHILS ABSOLUTE: 1.04 E9/L (ref 0.05–0.5)
EOSINOPHILS RELATIVE PERCENT: 8.2 % (ref 0–6)
GFR AFRICAN AMERICAN: 40
GFR NON-AFRICAN AMERICAN: 33 ML/MIN/1.73
GLUCOSE BLD-MCNC: 89 MG/DL (ref 74–99)
HCT VFR BLD CALC: 25.9 % (ref 34–48)
HEMOGLOBIN: 8.5 G/DL (ref 11.5–15.5)
IMMATURE GRANULOCYTES #: 0.2 E9/L
IMMATURE GRANULOCYTES %: 1.6 % (ref 0–5)
LYMPHOCYTES ABSOLUTE: 1.94 E9/L (ref 1.5–4)
LYMPHOCYTES RELATIVE PERCENT: 15.2 % (ref 20–42)
MAGNESIUM: 2 MG/DL (ref 1.6–2.6)
MCH RBC QN AUTO: 31.4 PG (ref 26–35)
MCHC RBC AUTO-ENTMCNC: 32.8 % (ref 32–34.5)
MCV RBC AUTO: 95.6 FL (ref 80–99.9)
MONOCYTES ABSOLUTE: 1.47 E9/L (ref 0.1–0.95)
MONOCYTES RELATIVE PERCENT: 11.5 % (ref 2–12)
NEUTROPHILS ABSOLUTE: 8.06 E9/L (ref 1.8–7.3)
NEUTROPHILS RELATIVE PERCENT: 63.2 % (ref 43–80)
PDW BLD-RTO: 16.2 FL (ref 11.5–15)
PHOSPHORUS: 2.8 MG/DL (ref 2.5–4.5)
PLATELET # BLD: 242 E9/L (ref 130–450)
PMV BLD AUTO: 10.2 FL (ref 7–12)
POTASSIUM REFLEX MAGNESIUM: 3.8 MMOL/L (ref 3.5–5)
RBC # BLD: 2.71 E12/L (ref 3.5–5.5)
SODIUM BLD-SCNC: 143 MMOL/L (ref 132–146)
TOTAL PROTEIN: 5.1 G/DL (ref 6.4–8.3)
WBC # BLD: 12.8 E9/L (ref 4.5–11.5)

## 2020-11-01 PROCEDURE — 2060000000 HC ICU INTERMEDIATE R&B

## 2020-11-01 PROCEDURE — 83735 ASSAY OF MAGNESIUM: CPT

## 2020-11-01 PROCEDURE — 99238 HOSP IP/OBS DSCHRG MGMT 30/<: CPT | Performed by: SURGERY

## 2020-11-01 PROCEDURE — 6360000002 HC RX W HCPCS: Performed by: SURGERY

## 2020-11-01 PROCEDURE — 6370000000 HC RX 637 (ALT 250 FOR IP): Performed by: SURGERY

## 2020-11-01 PROCEDURE — 36415 COLL VENOUS BLD VENIPUNCTURE: CPT

## 2020-11-01 PROCEDURE — 80053 COMPREHEN METABOLIC PANEL: CPT

## 2020-11-01 PROCEDURE — 84100 ASSAY OF PHOSPHORUS: CPT

## 2020-11-01 PROCEDURE — 2580000003 HC RX 258: Performed by: SURGERY

## 2020-11-01 PROCEDURE — 85025 COMPLETE CBC W/AUTO DIFF WBC: CPT

## 2020-11-01 RX ADMIN — Medication 10 ML: at 09:15

## 2020-11-01 RX ADMIN — ACETAMINOPHEN ORAL SOLUTION 650 MG: 650 SOLUTION ORAL at 22:05

## 2020-11-01 RX ADMIN — ACETAMINOPHEN ORAL SOLUTION 650 MG: 650 SOLUTION ORAL at 19:44

## 2020-11-01 RX ADMIN — SENNOSIDES AND DOCUSATE SODIUM 1 TABLET: 8.6; 5 TABLET ORAL at 20:19

## 2020-11-01 RX ADMIN — ACETAMINOPHEN ORAL SOLUTION 650 MG: 650 SOLUTION ORAL at 05:36

## 2020-11-01 RX ADMIN — ACETAMINOPHEN ORAL SOLUTION 650 MG: 650 SOLUTION ORAL at 12:02

## 2020-11-01 RX ADMIN — POLYETHYLENE GLYCOL 3350 17 G: 17 POWDER, FOR SOLUTION ORAL at 09:15

## 2020-11-01 RX ADMIN — HEPARIN SODIUM 5000 UNITS: 10000 INJECTION INTRAVENOUS; SUBCUTANEOUS at 15:30

## 2020-11-01 RX ADMIN — Medication 10 ML: at 20:19

## 2020-11-01 RX ADMIN — DULOXETINE HYDROCHLORIDE 30 MG: 30 CAPSULE, DELAYED RELEASE ORAL at 09:15

## 2020-11-01 RX ADMIN — HEPARIN SODIUM 5000 UNITS: 10000 INJECTION INTRAVENOUS; SUBCUTANEOUS at 20:19

## 2020-11-01 RX ADMIN — AMLODIPINE BESYLATE 5 MG: 5 TABLET ORAL at 09:14

## 2020-11-01 RX ADMIN — SENNOSIDES AND DOCUSATE SODIUM 1 TABLET: 8.6; 5 TABLET ORAL at 09:15

## 2020-11-01 RX ADMIN — HEPARIN SODIUM 5000 UNITS: 10000 INJECTION INTRAVENOUS; SUBCUTANEOUS at 05:36

## 2020-11-01 ASSESSMENT — PAIN SCALES - GENERAL
PAINLEVEL_OUTOF10: 2
PAINLEVEL_OUTOF10: 5
PAINLEVEL_OUTOF10: 1
PAINLEVEL_OUTOF10: 0
PAINLEVEL_OUTOF10: 5
PAINLEVEL_OUTOF10: 0
PAINLEVEL_OUTOF10: 4
PAINLEVEL_OUTOF10: 7

## 2020-11-01 ASSESSMENT — PAIN DESCRIPTION - LOCATION: LOCATION: ARM;HIP;LEG

## 2020-11-01 ASSESSMENT — PAIN DESCRIPTION - PAIN TYPE: TYPE: ACUTE PAIN

## 2020-11-01 ASSESSMENT — PAIN DESCRIPTION - DESCRIPTORS: DESCRIPTORS: ACHING;DISCOMFORT;NAGGING

## 2020-11-01 ASSESSMENT — PAIN DESCRIPTION - ORIENTATION: ORIENTATION: LEFT

## 2020-11-01 NOTE — PLAN OF CARE
Problem: Skin Integrity:  Goal: Will show no infection signs and symptoms  Description: Will show no infection signs and symptoms  11/1/2020 0628 by Fifi Sandoval RN  Outcome: Met This Shift  10/31/2020 2213 by Fifi Sandoval RN  Outcome: Met This Shift  Goal: Absence of new skin breakdown  Description: Absence of new skin breakdown  11/1/2020 0628 by Fifi Sandoval RN  Outcome: Met This Shift  10/31/2020 2213 by Fifi Sandoval RN  Outcome: Met This Shift

## 2020-11-01 NOTE — PROGRESS NOTES
[address] [facility] TEAM  TRAUMA/GENERAL SURGERY  ATTENDING PROGRESS NOTE  _____________________________________________________    Patient: Betsy Love   Room:    7811/5822-D  Date of service:   11/1/2020   LOS:     7  _____________________________________________________      CC:   Standing height fall    Subjective:  10/29-transferred from [facility] yesterday. Received 1 unit RBCs this morning. No new complaints. 10/30-seems a little more lethargic today. Complaining of left arm pain  10/31-sleeping soundly but awakens to voice. Complains of severe burning pain in the heel of her right foot. Sitter at bedside. 11/1-doing much better today. Tele sitter in room    Objective:  Vitals:    10/31/20 0934 10/31/20 1148 10/31/20 1500 11/01/20 0015   BP: (!) 103/46 127/60 131/60 139/62   Pulse: 73 71 84 68   Resp: 20 18 17 19   Temp: 98.6 °F (37 °C) 99.9 °F (37.7 °C) 96.8 °F (36 °C) 97.5 °F (36.4 °C)   TempSrc: Temporal Axillary Axillary Temporal   SpO2: 96% 96% 96% 94%   Weight:       Height:            I/O last 3 completed shifts: In: 1416.7 [P.O.:1170; Blood:246.7]  Out: 925 [Urine:925]    General - no apparent distress   Neuro - Awake, alert, attentive but bradykinetic        Assessment:    Fall from standing  Left humerus fracture  Left hip fracture  Acute blood loss anemia-hemoglobin 8.5  Urinary tract infection  Remote T11 fracture  Parkinson's disease  Hypertension  Chronic kidney disease    Plan:  Monitor CBC  Rocephin completed  Continue therapy  Tylenol for pain  Continue modified diet    DVT prophylaxis -SCDs. Subcutaneous heparin. Disposition:  DNR [facility]    Discharge to subacute rehabilitation unit today. NOTE: This report was transcribed using voice recognition software. Every effort was made to ensure accuracy; however, inadvertent computerized transcription errors may be present.

## 2020-11-01 NOTE — PLAN OF CARE
Problem: Pain:  Goal: Control of acute pain  Description: Control of acute pain  Outcome: Met This Shift     Problem: Skin Integrity:  Goal: Will show no infection signs and symptoms  Description: Will show no infection signs and symptoms  11/1/2020 1816 by Mckenzie Gtz RN  Outcome: Met This Shift  11/1/2020 0628 by Claudette Samuels RN  Outcome: Met This Shift  Goal: Absence of new skin breakdown  Description: Absence of new skin breakdown  11/1/2020 1816 by Mckenzie Gtz RN  Outcome: Met This Shift  11/1/2020 0628 by Claudette Samuels RN  Outcome: Met This Shift     Problem: Falls - Risk of:  Goal: Will remain free from falls  Description: Will remain free from falls  Outcome: Met This Shift  Goal: Absence of physical injury  Description: Absence of physical injury  Outcome: Met This Shift

## 2020-11-02 VITALS
RESPIRATION RATE: 20 BRPM | OXYGEN SATURATION: 95 % | WEIGHT: 144.18 LBS | HEART RATE: 72 BPM | BODY MASS INDEX: 28.31 KG/M2 | TEMPERATURE: 96.9 F | DIASTOLIC BLOOD PRESSURE: 65 MMHG | SYSTOLIC BLOOD PRESSURE: 153 MMHG | HEIGHT: 60 IN

## 2020-11-02 LAB
ALBUMIN SERPL-MCNC: 2.6 G/DL (ref 3.5–5.2)
ALP BLD-CCNC: 457 U/L (ref 35–104)
ALT SERPL-CCNC: 34 U/L (ref 0–32)
ANION GAP SERPL CALCULATED.3IONS-SCNC: 12 MMOL/L (ref 7–16)
AST SERPL-CCNC: 56 U/L (ref 0–31)
BASOPHILS ABSOLUTE: 0.04 E9/L (ref 0–0.2)
BASOPHILS RELATIVE PERCENT: 0.3 % (ref 0–2)
BILIRUB SERPL-MCNC: 1.1 MG/DL (ref 0–1.2)
BUN BLDV-MCNC: 40 MG/DL (ref 8–23)
CALCIUM SERPL-MCNC: 8.1 MG/DL (ref 8.6–10.2)
CHLORIDE BLD-SCNC: 106 MMOL/L (ref 98–107)
CO2: 21 MMOL/L (ref 22–29)
CREAT SERPL-MCNC: 1.2 MG/DL (ref 0.5–1)
EOSINOPHILS ABSOLUTE: 0.98 E9/L (ref 0.05–0.5)
EOSINOPHILS RELATIVE PERCENT: 8.6 % (ref 0–6)
GFR AFRICAN AMERICAN: 52
GFR NON-AFRICAN AMERICAN: 43 ML/MIN/1.73
GLUCOSE BLD-MCNC: 87 MG/DL (ref 74–99)
HCT VFR BLD CALC: 25.5 % (ref 34–48)
HEMOGLOBIN: 8.1 G/DL (ref 11.5–15.5)
IMMATURE GRANULOCYTES #: 0.23 E9/L
IMMATURE GRANULOCYTES %: 2 % (ref 0–5)
LYMPHOCYTES ABSOLUTE: 2.06 E9/L (ref 1.5–4)
LYMPHOCYTES RELATIVE PERCENT: 18 % (ref 20–42)
MAGNESIUM: 1.9 MG/DL (ref 1.6–2.6)
MCH RBC QN AUTO: 31 PG (ref 26–35)
MCHC RBC AUTO-ENTMCNC: 31.8 % (ref 32–34.5)
MCV RBC AUTO: 97.7 FL (ref 80–99.9)
MONOCYTES ABSOLUTE: 1.38 E9/L (ref 0.1–0.95)
MONOCYTES RELATIVE PERCENT: 12 % (ref 2–12)
NEUTROPHILS ABSOLUTE: 6.77 E9/L (ref 1.8–7.3)
NEUTROPHILS RELATIVE PERCENT: 59.1 % (ref 43–80)
PDW BLD-RTO: 16.6 FL (ref 11.5–15)
PHOSPHORUS: 2.2 MG/DL (ref 2.5–4.5)
PLATELET # BLD: 325 E9/L (ref 130–450)
PMV BLD AUTO: 10.2 FL (ref 7–12)
POTASSIUM REFLEX MAGNESIUM: 3.7 MMOL/L (ref 3.5–5)
RBC # BLD: 2.61 E12/L (ref 3.5–5.5)
SODIUM BLD-SCNC: 139 MMOL/L (ref 132–146)
TOTAL PROTEIN: 5.3 G/DL (ref 6.4–8.3)
WBC # BLD: 11.5 E9/L (ref 4.5–11.5)

## 2020-11-02 PROCEDURE — 6370000000 HC RX 637 (ALT 250 FOR IP): Performed by: SURGERY

## 2020-11-02 PROCEDURE — 6360000002 HC RX W HCPCS: Performed by: SURGERY

## 2020-11-02 PROCEDURE — 36415 COLL VENOUS BLD VENIPUNCTURE: CPT

## 2020-11-02 PROCEDURE — 83735 ASSAY OF MAGNESIUM: CPT

## 2020-11-02 PROCEDURE — 85025 COMPLETE CBC W/AUTO DIFF WBC: CPT

## 2020-11-02 PROCEDURE — 84100 ASSAY OF PHOSPHORUS: CPT

## 2020-11-02 PROCEDURE — 2580000003 HC RX 258: Performed by: SURGERY

## 2020-11-02 PROCEDURE — 80053 COMPREHEN METABOLIC PANEL: CPT

## 2020-11-02 PROCEDURE — 99232 SBSQ HOSP IP/OBS MODERATE 35: CPT | Performed by: SURGERY

## 2020-11-02 RX ADMIN — Medication 10 ML: at 08:49

## 2020-11-02 RX ADMIN — HEPARIN SODIUM 5000 UNITS: 10000 INJECTION INTRAVENOUS; SUBCUTANEOUS at 05:07

## 2020-11-02 RX ADMIN — AMLODIPINE BESYLATE 5 MG: 5 TABLET ORAL at 08:49

## 2020-11-02 RX ADMIN — SENNOSIDES AND DOCUSATE SODIUM 1 TABLET: 8.6; 5 TABLET ORAL at 08:49

## 2020-11-02 RX ADMIN — POLYETHYLENE GLYCOL 3350 17 G: 17 POWDER, FOR SOLUTION ORAL at 08:49

## 2020-11-02 RX ADMIN — ACETAMINOPHEN ORAL SOLUTION 650 MG: 650 SOLUTION ORAL at 09:38

## 2020-11-02 RX ADMIN — ACETAMINOPHEN ORAL SOLUTION 650 MG: 650 SOLUTION ORAL at 04:09

## 2020-11-02 RX ADMIN — ACETAMINOPHEN ORAL SOLUTION 650 MG: 650 SOLUTION ORAL at 15:57

## 2020-11-02 RX ADMIN — DULOXETINE HYDROCHLORIDE 30 MG: 30 CAPSULE, DELAYED RELEASE ORAL at 08:49

## 2020-11-02 RX ADMIN — HEPARIN SODIUM 5000 UNITS: 10000 INJECTION INTRAVENOUS; SUBCUTANEOUS at 14:09

## 2020-11-02 ASSESSMENT — PAIN DESCRIPTION - ONSET: ONSET: ON-GOING

## 2020-11-02 ASSESSMENT — PAIN DESCRIPTION - LOCATION: LOCATION: BACK

## 2020-11-02 ASSESSMENT — PAIN SCALES - GENERAL
PAINLEVEL_OUTOF10: 3
PAINLEVEL_OUTOF10: 4
PAINLEVEL_OUTOF10: 0
PAINLEVEL_OUTOF10: 2
PAINLEVEL_OUTOF10: 0

## 2020-11-02 ASSESSMENT — PAIN DESCRIPTION - PAIN TYPE: TYPE: CHRONIC PAIN

## 2020-11-02 ASSESSMENT — PAIN DESCRIPTION - PROGRESSION: CLINICAL_PROGRESSION: NOT CHANGED

## 2020-11-02 ASSESSMENT — PAIN DESCRIPTION - FREQUENCY: FREQUENCY: INTERMITTENT

## 2020-11-02 ASSESSMENT — PAIN DESCRIPTION - DESCRIPTORS: DESCRIPTORS: ACHING

## 2020-11-02 ASSESSMENT — PAIN - FUNCTIONAL ASSESSMENT: PAIN_FUNCTIONAL_ASSESSMENT: PREVENTS OR INTERFERES SOME ACTIVE ACTIVITIES AND ADLS

## 2020-11-02 NOTE — PROGRESS NOTES
meals/ONS       Nutrition Monitoring and Evaluation:   Food/Nutrient Intake Outcomes:  Supplement Intake, Food and Nutrient Intake  Physical Signs/Symptoms Outcomes:  Biochemical Data, Chewing or Swallowing, GI Status, Fluid Status or Edema, Hemodynamic Status, Nutrition Focused Physical Findings, Skin, Weight     Discharge Planning:     Too soon to determine     Electronically signed by Sarah Diaz RD, LD on 11/2/20 at 9:54 AM EST    Contact: x [phone]

## 2020-11-02 NOTE — PROGRESS NOTES
03/02/2017    Lumbosacral spondylosis 03/02/2017    Chronic pain 03/02/2017     Status post fall  Status post ORIF left hip fracture--per Ortho  Left humerus fracture/left clavicle fracture--per Ortho  Diet as tolerated  PT/OT malik  PCDs  Discharge planning    Shannan Torre MD, FACS  11/2/2020  1:41 PM      NOTE: This report was transcribed using voice recognition software. Every effort was made to ensure accuracy; however, inadvertent computerized transcription errors may be present.

## 2020-11-02 NOTE — DISCHARGE INSTR - COC
Continuity of Care Form    Patient Name: Alejandra Rojas   :  1935  MRN:  [id number]    Admit date:  10/25/2020  Discharge date:  2020    Code Status Order: DNR-CCA   Advance Directives:      Admitting Physician:  Raymon Roblero MD  PCP: Salvador Gentile MD    Discharging Nurse: [facility] Unit/Room#: 9255/8055-K  Discharging Unit Phone Number: [phone]    Emergency Contact:   Extended Emergency Contact Information  Primary Emergency Contact: [address] Phone: [phone]  Mobile Phone: [phone]  Relation: Child  Secondary Emergency Contact: [facility]  Mobile Phone: [phone]  Relation: Spouse    Past Surgical History:  Past Surgical History:   Procedure Laterality Date    ARM SURGERY Left 2020    PARTIAL OSTECTOMY LEFT HUMERUS (CPT 59745) performed by Dallas Storey DO at [address] [facility], DIAGNOSTIC      EYE SURGERY      carmelo lense implants    HIP FRACTURE SURGERY Left 10/26/2020    HIP OPEN REDUCTION INTERNAL FIXATION performed by Nicole Mosqueda DO at [address] REPLACEMENT      NERVE BLOCK Left 2017    leftlumbar transforaminal NB #1     NERVE BLOCK Left 2017    Left lumbar transforaminal nerve block #2 L4-5, L5-S1    NERVE BLOCK Left 2017    transforaminal nerve block, lumbar #3    NERVE BLOCK Left 08/15/2017    paravertebral facet left lumbar #1    OTHER SURGICAL HISTORY  02/10/2017    CT guided lumbar spine bone biopsy    PARATHYROID GLAND SURGERY      [place] LUMBAR SPINE FUSN,POST INTRBDY N/A 2018    L3-S1 FUSION, L4-L5, L5-S1  TRANSFORAMINAL LUMBAR INTERBODY FUSION -- NEEDS CAGES, PLATES, SCREWS, O-ARM, AUDIOLOGY, CHIARA TABLE, CELL SAVER, PLATELET GEL - [employer] performed by Mikael Brown MD at [address]       Immunization History:   Immunization History   Administered Date(s) Administered    Influenza Virus Signs: BP (!) 129/58   Pulse 81   Temp 98 °F (36.7 °C) (Temporal)   Resp 20   Ht 5' (1.524 m)   Wt 144 lb 2.9 oz (65.4 kg)   SpO2 93%   BMI 28.16 kg/m²     Last documented pain score (0-10 scale): Pain Level: 3  Last Weight:   Wt Readings from Last 1 Encounters:   10/28/20 144 lb 2.9 oz (65.4 kg)     Mental Status:  disoriented and alert    IV Access:  - None    Nursing Mobility/ADLs:  Walking   Dependent  Transfer  Dependent  Bathing  Dependent  Dressing  Dependent  Toileting  Dependent  Feeding  Dependent  Med Admin  Dependent  Med Delivery   whole    Wound Care Documentation and Therapy:  Wound 10/25/20 Arm Left;Posterior; Lateral (Active)   Wound Etiology Skin Tear 11/01/20 2350   Dressing Status Clean;Dry; Intact 11/02/20 0930   Wound Cleansed Irrigated with saline 10/25/20 1250   Dressing/Treatment Other (comment) 11/01/20 2350   Wound Length (cm) 2.5 cm 10/28/20 1525   Wound Width (cm) 1 cm 10/28/20 1525   Wound Depth (cm) 0.1 cm 10/28/20 1525   Wound Surface Area (cm^2) 2.5 cm^2 10/28/20 1525   Wound Volume (cm^3) 0.25 cm^3 10/28/20 1525   Wound Assessment Dry 11/02/20 0930   Drainage Amount None 11/02/20 0930   Number of days: 7        Elimination:  Continence:   · Bowel: No  · Bladder: No  Urinary Catheter: Removal Date 11/82/2020 1520   Colostomy/Ileostomy/Ileal Conduit: No       Date of Last BM: 11/2/2020      Intake/Output Summary (Last 24 hours) at 11/2/2020 1148  Last data filed at 11/2/2020 0602  Gross per 24 hour   Intake 400 ml   Output 850 ml   Net -450 ml     I/O last 3 completed shifts:   In: 400 [P.O.:400]  Out: 850 [Urine:850]    Safety Concerns:     Sundowners [place], History of Falls (last 30 days) and Aspiration Risk    Impairments/Disabilities:      None    Nutrition Therapy:  Current Nutrition Therapy:   - Oral Diet:  Dental Soft    Routes of Feeding: Oral  Liquids: thin liquids no straw  Daily Fluid Restriction: no  Last Modified Barium Swallow with Video (Video Swallowing Test): not done    Treatments at the Time of Hospital Discharge:   Respiratory Treatments:   Oxygen Therapy:  is not on home oxygen therapy. Ventilator:    - No ventilator support    Rehab Therapies: Physical Therapy, Occupational Therapy and Speech/Language Therapy  Weight Bearing Status/Restrictions: No weight bearing restirctions  Other Medical Equipment (for information only, NOT a DME order):  hospital bed  Other Treatments:     Patient's personal belongings (please select all that are sent with patient):  None    RN SIGNATURE:  Electronically signed by Aura Kent RN on 11/2/20 at 3:25 PM EST    CASE MANAGEMENT/SOCIAL WORK SECTION    Inpatient Status Date: ***    Readmission Risk Assessment Score:  Readmission Risk              Risk of Unplanned Readmission:        20           Discharging to Facility/ Agency   · Name:   · Address:  · Phone:  · Fax:    Dialysis Facility (if applicable)   · Name:  · Address:  · Dialysis Schedule:  · Phone:  · Fax:    / signature: {Esignature:[id number]:::0}    PHYSICIAN SECTION    Prognosis: {Prognosis:9147372729:::0}    Condition at Discharge: [address] [place] [address] Patient Condition:[id number]:::0}    Rehab Potential (if transferring to Rehab): {Prognosis:0832342385:::0}    Recommended Labs or Other Treatments After Discharge: PT/OT, Ortho follow up    Physician Certification: I certify the above information and transfer of Pratik Yeager  is necessary for the continuing treatment of the diagnosis listed and that she requires {Admit to Appropriate Level of Care:34208:::0} for {GREATER/LESS:742732479} 30 days.      Update Admission H&P: Changes in H&P as follows - See Discharge Summary    PHYSICIAN SIGNATURE:  {Esignature:[id number]:::0}

## 2020-11-02 NOTE — CARE COORDINATION
Private pay arrangements made with [facility]. Spoke with Eleni rich, private pay álvarez remains the plan. Bed available today. HENS and ambulance on soft chart. Will arrange if stable for discharge. COVID (-) 10/29. [facility] to [facility] for [facility]. Facility notified of discharge time. [facility] arranged. Eleni Rich notified of discharge time. For questions I can be reached at [phone].  Wali Diaz [place]

## 2020-11-02 NOTE — PLAN OF CARE
Problem: Pain:  Goal: Control of acute pain  Description: Control of acute pain  11/1/2020 1816 by Song Tilley RN  Outcome: Met This Shift     Problem: Skin Integrity:  Goal: Will show no infection signs and symptoms  Description: Will show no infection signs and symptoms  11/1/2020 2208 by Rehana Toro RN  Outcome: Met This Shift  11/1/2020 1816 by Song Tilley RN  Outcome: Met This Shift  Goal: Absence of new skin breakdown  Description: Absence of new skin breakdown  11/1/2020 2208 by Rehana Toro RN  Outcome: Met This Shift  11/1/2020 1816 by Song Tilley RN  Outcome: Met This Shift     Problem: Falls - Risk of:  Goal: Will remain free from falls  Description: Will remain free from falls  11/1/2020 2208 by Rehana Toro RN  Outcome: Met This Shift  11/1/2020 1816 by Song Tilley RN  Outcome: Met This Shift  Goal: Absence of physical injury  Description: Absence of physical injury  11/1/2020 2208 by Rehana Toro RN  Outcome: Met This Shift  11/1/2020 1816 by Song Tilley RN  Outcome: Met This Shift

## 2020-11-06 NOTE — PROGRESS NOTES
Physician Progress Note      PATIENT:               Cleo Coronado  CSN #:                  [id number]  :                       1935  ADMIT DATE:       10/25/2020 12:11 PM  [address] [place] DATE:        2020 4:29 PM  RESPONDING  PROVIDER #:        Alicia Moody MD          QUERY TEXT:    Patient admitted with S/p fall noted to have UTI and AMS. Documentation   reflects sepsis in notes dated 10/25 and 10/26. If possible, please document   in the progress notes and discharge summary if sepsis was: The medical record reflects the following:  Risk Factors: trauma, UTI  Clinical Indicators: Per H&P: Admit to SICU for close observation given   initial hypotension, unclear if septic picture, so will work that up .  10/26   PN: ID: Leukocytosis, UA +, Rocephin for UTI. ? Hypotension due to sepsis,   will continue to monitor. Treatment: labs and monitoring, IV ABX, IVF    Thank you  Valentina Heller RN BSN  Clinical Documentation  [phone]  Options provided:  -- Sepsis treated and resolved  -- Sepsis ruled out after study  -- Sepsis confirmed after study  -- Other - I will add my own diagnosis  -- Disagree - Not applicable / Not valid  -- Disagree - Clinically unable to determine / Unknown  -- Refer to Clinical Documentation Reviewer    PROVIDER RESPONSE TEXT:    Pt initially treated for ? Of sepsis from OSH. She was treated with IVF abx   and blood products and found to have multiple fx. She was treated for the fx   and the UTI.  I do not believe she presented with sepsis but rather a   significant trauma burden and incidentally also had a UTI    Query created by: Rakesh Ruiz on 2020 8:53 AM      Electronically signed by:  Alicia Moody MD 2020 9:22 AM

## 2020-11-09 ENCOUNTER — HOSPITAL ENCOUNTER (OUTPATIENT)
Dept: GENERAL RADIOLOGY | Age: 85
Discharge: HOME | End: 2020-11-11
Payer: MEDICARE

## 2020-11-09 ENCOUNTER — OFFICE VISIT (OUTPATIENT)
Dept: ORTHOPEDIC SURGERY | Age: 85
End: 2020-11-09
Payer: MEDICARE

## 2020-11-09 VITALS — DIASTOLIC BLOOD PRESSURE: 64 MMHG | TEMPERATURE: 97.1 F | HEART RATE: 66 BPM | SYSTOLIC BLOOD PRESSURE: 94 MMHG

## 2020-11-09 PROCEDURE — 73030 X-RAY EXAM OF SHOULDER: CPT

## 2020-11-09 PROCEDURE — 73502 X-RAY EXAM HIP UNI 2-3 VIEWS: CPT

## 2020-11-09 PROCEDURE — 73552 X-RAY EXAM OF FEMUR 2/>: CPT

## 2020-11-09 PROCEDURE — 99024 POSTOP FOLLOW-UP VISIT: CPT | Performed by: PHYSICIAN ASSISTANT

## 2020-11-09 PROCEDURE — 99212 OFFICE O/P EST SF 10 MIN: CPT

## 2020-11-09 RX ORDER — OXYCODONE HYDROCHLORIDE AND ACETAMINOPHEN 5; 325 MG/1; MG/1
1 TABLET ORAL EVERY 6 HOURS PRN
COMMUNITY
End: 2021-02-11

## 2020-11-09 RX ORDER — ACETAMINOPHEN 325 MG/1
650 TABLET ORAL EVERY 6 HOURS PRN
COMMUNITY

## 2020-11-09 NOTE — PROGRESS NOTES
OP: SURGEON: Dr. Lou Ritter DO  DATE OF PROCEDURE: 10/26/2020  PROCEDURE:1.  Left femur intertrochanteric fracture cephalomedullary nail stabilization   2. Closed treatment left proximal humerus fracture     Subjective: History obtained from SNF documentation as well as medical chart. Patient does have underlying dementia and is poor historian but follows commands. Jose C Hwang is approximately 2 weeks follow-up from the above surgery. Patient is WBAT on left lower extremity. She is nonweightbearing on left upper extremity. Presents with sling, in wheelchair and ana lilia lift. Patient says yes when asked if she has been up with therapy. Patient denies numbness to left upper or left lower extremity. On MAR from facility has Percocet and APAP for pain. MAR also lists ASA BID for DVT proph. The patient's facility comprehensive medical history transfer sheets were evaluated, and their history, medications, allergies, treatments were updated in [facility] today. Of note patient has had multiple fractures in the past per record review with history prior right proximal humerus fracture which has gone on to union and of a left humeral shaft fracture that was treated nonoperatively and is since healed in malunion. A chest x-ray taken for pneumonia on October 22 demonstrated a left proximal humerus fracture with minimal displacement. X-rays in the trauma bay demonstrated further displacement of the left proximal humerus fracture    Review of Systems -    Patient does not answer questions appropriately     Objective:    General: Alert but pleasantly confused, normocephalic atraumatic, external ears and eye normal, sclera clear, no acute distress, respirations easy and unlabored with no audible wheezes, skin warm and dry, dress appropriate for noted age, affect euthymic.     Extremity:  Left Upper Extremity  Skin is clean dry and intact, no skin breakdown   Moderate edema noted  Moderate amount of healing ecchymosis  TTP about the proximal humerus with palpable deformity  Radial pulse palpable, fingers warm with BCR  Flex/extension intact to wrist, thumb and fingers. Patient cannot fully extend middle finger. Finger opposition intact  Finger adduction/abduction intact  Finger crossover intact  Subjectively states sensation intact to radial/medial/ulnar distribution      Left Lower Extremity  Skin clean dry and intact, without signs of infection  Incisions well approximated without signs of redness, warmth or drainage- staples intact and ready for removal   Mild edema noted  Compartments supple throughout thigh and leg  Calf supple and nontender  Patient does not show any signs of pain with palpation of the left hip, thigh. No grimace with log roll of the hip. Demonstrates active knee flexion/extension, ankle plantar/dorsiflexion/great toe extension. Weak DF and EHL but flickers. States sensation intact to touch in sural/deep peroneal/superficial peroneal/saphenous/posterior tibial nerve distributions to foot/ankle. Palpable dorsalis pedis and posterior tibialis pulses, cap refill brisk in toes, foot warm/perfused. BP 94/64   Pulse 66   Temp 97.1 °F (36.2 °C) (Oral)     XR:   Multiple views of the left shoulder are compared with outside images on time of the trauma shows humerus neck fracture with 100% displacement of the humerus shaft anteriorly. Appears that humerus head still concentrically located in glenoid. Again visualized humerus shaft fracture with malunion     AP pelvis, multiple views of the left hip and femur demonstrates stable appearance of the left CMN with cement into the femoral head. Fracture without change in alignment. No evidence of hardware failure or loosening. Partial visualization of prior lumbosacral fusion    Assessment:   Diagnosis Orders   1. Traumatic closed displaced fracture of anatomical neck of humerus, left, with malunion, subsequent encounter  XR SHOULDER LEFT (MIN 2 VIEWS)   2. Closed intertrochanteric fracture of hip, left, with routine healing, subsequent encounter         Plan:  [facility]  Removed staples from left hip and femur  Steri strips should fall off in the shower at some point, if they do not fall off in 10 days, remove them  Dressing: Can remove dressing in 1-2 days then open to air  Can shower in a couple days, NO Soaking or submerging incision in water until fully healed & all scabs are gone    WB:  Weight bearing as tolerated on left lower extremity  continue nonweightbearing on left upper extremity    Old Right proximal humerus fracture  No significant interval change to previously identified LEFT proximal humerus fracture (this has been managed by Dr. Harmony Joiner)  Will follow left proximal humerus while we are seeing for hip but would recommend follow up with previous orthopedic physician. Therapy: continue with PT/OT towards weightbearing activity on BLE. Fall prevention, Transfers. May continue sling for comfort. Come out of sling daily for pendulums, elbow/wrist and hand ROM. Do not recommend A/PROM of the shoulder at this time due to displaced fracture  Please allow weight of elbow to hang in sling     DVT: Continue with ASA 81 mg BID as ordered  Pain control : Per facility physician- multimodal pain control recommended    Continue with ice to the injured extremity 2-3 times per day for swelling  Recommend ice to the left shoulder    Follow up in 4 weeks with XR of the left hip and femur     Future Appointments   Date Time Provider Jean Kyle   12/7/2020 10:00 AM SCHEDULE, [facility]           Electronically signed by Amy Polk PA-C on 11/9/2020 at 1:12 PM  Note: This report was completed using [employer] voiced recognition software. Every effort has been made to ensure accuracy; however, inadvertent computerized transcription errors may be present.

## 2020-11-12 NOTE — PROGRESS NOTES
Physician Progress Note      PATIENT:               Cleo Coronado  CSN #:                  [id number]  :                       1935  ADMIT DATE:       10/25/2020 12:11 PM  [address] [place] DATE:        2020 4:29 PM  RESPONDING  PROVIDER #:        Cuauhtemoc Slaughter MD          QUERY TEXT:    Noted documentation of severe malnutrition in 10/27 progress note. If   possible, please document in progress notes and discharge summary:    The medical record reflects the following:  Risk Factors: s/p fall w/ hip fx. now w/ increased need 2/2 healing s/p ORIF  Clinical Indicators: Per 10/27 PN: Severe malnutrition--place corpak and start   tube feeds. .Acute hypernatremia--change IVF. Per dietary consult: Malnutrition Assessment:Malnutrition Status: At risk for malnutrition   Findings of the 6 clinical characteristics of malnutrition:Energy Intake:  7 -   50% or less of estimated energy requirements for 5 or more days Weight Loss:    No significant weight loss   Body Fat Loss:  Unable to assess   Muscle Mass   Loss:  Unable to assess  Fluid Accumulation:  No significant fluid   accumulation   Strength:  Not Performed  Treatment: core korin placed for tube feeds, Labs and monitoring, dietary   consult, speech consult    Thank you  Valentina Heller RN BSN  Clinical Documentation  [phone]  Options provided:  -- Severe malnutrition was ruled out  -- Severe malnutrition present as evidenced by, Please document evidence. -- Other - I will add my own diagnosis  -- Disagree - Not applicable / Not valid  -- Disagree - Clinically unable to determine / Unknown  -- Refer to Clinical Documentation Reviewer    PROVIDER RESPONSE TEXT:    Severe malnutrition was ruled out after study.     Query created by: Rakesh Ruiz on 2020 7:10 AM      Electronically signed by:  Cuauhtemoc Slaughter MD 2020 2:58 PM

## 2021-02-11 ENCOUNTER — OFFICE VISIT (OUTPATIENT)
Dept: PRIMARY CARE CLINIC | Age: 86
End: 2021-02-11
Payer: MEDICARE

## 2021-02-11 VITALS
SYSTOLIC BLOOD PRESSURE: 92 MMHG | DIASTOLIC BLOOD PRESSURE: 59 MMHG | HEART RATE: 56 BPM | TEMPERATURE: 98.4 F | OXYGEN SATURATION: 96 %

## 2021-02-11 DIAGNOSIS — M47.817 SPONDYLOSIS OF LUMBOSACRAL REGION, UNSPECIFIED SPINAL OSTEOARTHRITIS COMPLICATION STATUS: Chronic | ICD-10-CM

## 2021-02-11 DIAGNOSIS — G20 PARKINSONISM, UNSPECIFIED PARKINSONISM TYPE (HCC): Primary | ICD-10-CM

## 2021-02-11 DIAGNOSIS — E78.5 HYPERLIPIDEMIA, UNSPECIFIED HYPERLIPIDEMIA TYPE: Chronic | ICD-10-CM

## 2021-02-11 DIAGNOSIS — R41.89 COGNITIVE IMPAIRMENT: ICD-10-CM

## 2021-02-11 DIAGNOSIS — Z51.5 HOSPICE CARE PATIENT: ICD-10-CM

## 2021-02-11 DIAGNOSIS — I10 ESSENTIAL HYPERTENSION: Chronic | ICD-10-CM

## 2021-02-11 DIAGNOSIS — N18.32 STAGE 3B CHRONIC KIDNEY DISEASE (HCC): Chronic | ICD-10-CM

## 2021-02-11 DIAGNOSIS — R53.81 DEBILITY: ICD-10-CM

## 2021-02-11 PROCEDURE — 1090F PRES/ABSN URINE INCON ASSESS: CPT | Performed by: STUDENT IN AN ORGANIZED HEALTH CARE EDUCATION/TRAINING PROGRAM

## 2021-02-11 PROCEDURE — G8484 FLU IMMUNIZE NO ADMIN: HCPCS | Performed by: STUDENT IN AN ORGANIZED HEALTH CARE EDUCATION/TRAINING PROGRAM

## 2021-02-11 PROCEDURE — 4040F PNEUMOC VAC/ADMIN/RCVD: CPT | Performed by: STUDENT IN AN ORGANIZED HEALTH CARE EDUCATION/TRAINING PROGRAM

## 2021-02-11 PROCEDURE — 1123F ACP DISCUSS/DSCN MKR DOCD: CPT | Performed by: STUDENT IN AN ORGANIZED HEALTH CARE EDUCATION/TRAINING PROGRAM

## 2021-02-11 PROCEDURE — G8417 CALC BMI ABV UP PARAM F/U: HCPCS | Performed by: STUDENT IN AN ORGANIZED HEALTH CARE EDUCATION/TRAINING PROGRAM

## 2021-02-11 PROCEDURE — 1036F TOBACCO NON-USER: CPT | Performed by: STUDENT IN AN ORGANIZED HEALTH CARE EDUCATION/TRAINING PROGRAM

## 2021-02-11 PROCEDURE — 99345 HOME/RES VST NEW HIGH MDM 75: CPT | Performed by: STUDENT IN AN ORGANIZED HEALTH CARE EDUCATION/TRAINING PROGRAM

## 2021-02-11 RX ORDER — LORAZEPAM 0.5 MG/1
0.5 TABLET ORAL EVERY EVENING
COMMUNITY
End: 2021-03-11

## 2021-02-11 ASSESSMENT — ENCOUNTER SYMPTOMS
NAUSEA: 0
BACK PAIN: 1
ABDOMINAL PAIN: 0
CONSTIPATION: 1
SORE THROAT: 0
DIARRHEA: 0
SHORTNESS OF BREATH: 0
TROUBLE SWALLOWING: 0
COUGH: 0

## 2021-02-16 ENCOUNTER — TELEPHONE (OUTPATIENT)
Dept: PRIMARY CARE CLINIC | Age: 86
End: 2021-02-16

## 2021-02-16 DIAGNOSIS — R53.81 DEBILITY: ICD-10-CM

## 2021-02-16 DIAGNOSIS — G20 PARKINSONISM, UNSPECIFIED PARKINSONISM TYPE (HCC): ICD-10-CM

## 2021-02-16 DIAGNOSIS — S72.002D CLOSED FRACTURE OF LEFT HIP WITH ROUTINE HEALING, SUBSEQUENT ENCOUNTER: Primary | ICD-10-CM

## 2021-02-16 NOTE — TELEPHONE ENCOUNTER
Raegan Whitley, pt [facility] called requesting a follow up call. She would like whether it's possible to get her grandmother off hospice and doing more PT care to help her get better. She can be reached at [phone]. She is at work and stated she was calling on her lunch break, but states you may leave a message on her voicemail if she does not answer.

## 2021-02-17 NOTE — TELEPHONE ENCOUNTER
Awaiting information from hospice IDT today from her , then will plan to call patient's granddaughter.     Bobby Roldan MD  2/17/2021

## 2021-02-19 NOTE — TELEPHONE ENCOUNTER
Called patient's granddaughter to follow-up on the patient's condition and goals of care. She states that the patient broke her left arm in May and went to a nursing facility for therapy. While there, she had a UTI, got confused, and got up without her walker and fell. She broke her hip and arm and required hospitalization, surgery, and then was at [address] for about a month before getting COVID-19. Due to her acute illness, she was unable to do therapy and was not progressing, so they made the choice to come home with hospice. Once at home, the patient stabilized and started to make some improvements. She was given a restorative physical therapy session through hospice and has been working on the exercises with her aids. These exercises primarily focus on range of motion, safe transferring, and other restorative issues. The patient has gotten stronger and is now able to transfer with 1 person assist and put weight down on her legs. Discussed her previous neurologic diagnoses. The patient never completed work-up with neurology (Dr. Anup Rojas) but on initial evaluation with neurology and with her PCP, they felt she was mostly consistent with Parkinson's disease. She would not swing arms when walking, had a shuffled gait, and a masklike face. She never tried levodopa-carbidopa due to PCP concerns that it would not help and due to previous medication sensitivities. She did try ropinirole which caused hallucinations. She may have been diagnosed with dementia by her PCP at one point but her granddaughter is unsure of this. She feels that her cognition is \" an 81year-old cognition\" and that the patient is doing okay. She has occasional forgetfulness and will have some difficulty doing tasks like the checkbook. Overall, she feels that the patient's memory has been stable for at least a few years. The patient has not had a stroke in the past that they are aware of.     The patient's granddaughter wanted to know about her rehabilitation potential.  Discussed that it is difficult to state definitively. Discussed that Parkinson's is a progressive neurologic disease that is expected to get worse and will complicate her rehabilitation potential.  If she is having cognition issues, this also will complicate rehabilitation. Discussed how rehabilitation with [facility] generally provides weeks to up to a few months of therapy work with goal to train the patient and caregivers to be able to do the exercises on their own. Discussed how the exercises given would likely be more involved than the ones given by hospice, but that after therapy is done, they will not continue coming out and they will be in a similar situation to now extend without hospice support. Reflected on patient's goal to be able to walk again, which her granddaughter says is the main  behind them looking into other options. Discussed concerns that even if the patient is able to walk, it would likely be only short distances. Noted some concern that the patient may not get to the point where she is strong enough or stable enough to walk given how long it has been since her injury and the complicating factors of her neurologic disease. Discussed weighing the pros and cons of giving out possible services to pursue [facility] therapy if there is a questionable chance to get back to ambulating. The patient granddaughter asked about the expense of obtaining PT evaluation at home through private pay. She is wondering if having PT see the patient, work with her for a short period of time, and see if she is progressing would help answer the question of her overall rehabilitation potential.  If she does well with therapy, then it may make sense to drop hospice but if she does not, then she can continue on with the services.   Discussed that I would reach out to my office staff to see if they have any information regarding the feasibility of this, as there are other patients who pay for therapy out-of-pocket after their [facility] certifications end.     Abida García MD  2/23/2021

## 2021-02-23 NOTE — TELEPHONE ENCOUNTER
Will place order for PT to be available if they want to pursue this. Thank you for looking into this and coordinating with family.     Celia Alvarenga MD  2/23/2021

## 2021-02-23 NOTE — TELEPHONE ENCOUNTER
We spoke to Batool Connor today who stated that this is possible. IT would cost $50 dollars per visit and they would need a script from you so that they can go in, we would just nelson it as private pay when we send the order over. I called [facility] to give her this information. She stated that she thought this was reasonable, but wants to run it past her grandparents and will call us back, probably tomorrow. We will let you know once we hear back.  Thanks

## 2021-03-04 PROBLEM — R41.89 COGNITIVE IMPAIRMENT: Status: ACTIVE | Noted: 2021-03-04

## 2021-03-04 PROBLEM — S72.142A CLOSED INTERTROCHANTERIC FRACTURE OF HIP, LEFT, INITIAL ENCOUNTER (HCC): Status: RESOLVED | Noted: 2020-10-26 | Resolved: 2021-03-04

## 2021-03-04 PROBLEM — D62 ACUTE BLOOD LOSS ANEMIA: Status: RESOLVED | Noted: 2018-09-15 | Resolved: 2021-03-04

## 2021-03-04 PROBLEM — N18.30 ACUTE RENAL FAILURE SUPERIMPOSED ON STAGE 3 CHRONIC KIDNEY DISEASE (HCC): Status: RESOLVED | Noted: 2020-05-12 | Resolved: 2021-03-04

## 2021-03-04 PROBLEM — T14.90XA TRAUMA: Status: RESOLVED | Noted: 2020-10-25 | Resolved: 2021-03-04
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
## 2021-03-04 PROBLEM — S42.212A CLOSED FRACTURE OF SURGICAL NECK OF LEFT HUMERUS: Status: RESOLVED | Noted: 2020-10-26 | Resolved: 2021-03-04

## 2021-03-04 PROBLEM — S42.342P: Status: RESOLVED | Noted: 2020-08-04 | Resolved: 2021-03-04

## 2021-03-04 PROBLEM — N17.9 AKI (ACUTE KIDNEY INJURY) (HCC): Status: RESOLVED | Noted: 2018-09-15 | Resolved: 2021-03-04

## 2021-03-04 PROBLEM — S41.132A: Status: RESOLVED | Noted: 2020-08-04 | Resolved: 2021-03-04

## 2021-03-04 PROBLEM — G20 PARKINSONISM (HCC): Status: ACTIVE | Noted: 2021-03-04

## 2021-03-04 PROBLEM — N17.9 ACUTE RENAL FAILURE SUPERIMPOSED ON STAGE 3 CHRONIC KIDNEY DISEASE (HCC): Status: RESOLVED | Noted: 2020-05-12 | Resolved: 2021-03-04

## 2021-03-04 PROBLEM — M48.54XA NON-TRAUMATIC COMPRESSION FRACTURE OF ELEVENTH THORACIC VERTEBRA (HCC): Status: RESOLVED | Noted: 2018-12-28 | Resolved: 2021-03-04

## 2021-03-04 PROBLEM — R53.81 DEBILITY: Status: ACTIVE | Noted: 2021-03-04

## 2021-03-04 PROBLEM — R41.82 ALTERED MENTAL STATUS: Status: RESOLVED | Noted: 2018-12-28 | Resolved: 2021-03-04

## 2021-03-04 PROBLEM — F05 ACUTE HYPERACTIVE DELIRIUM DUE TO MULTIPLE ETIOLOGIES: Status: RESOLVED | Noted: 2018-12-28 | Resolved: 2021-03-04

## 2021-03-04 PROBLEM — Z51.5 HOSPICE CARE PATIENT: Status: ACTIVE | Noted: 2021-03-04

## 2021-03-04 PROBLEM — S42.412A CLOSED DISPLACED SIMPLE SUPRACONDYLAR FRACTURE OF LEFT HUMERUS WITHOUT INTERCONDYLAR FRACTURE: Status: RESOLVED | Noted: 2020-05-11 | Resolved: 2021-03-04

## 2021-03-04 PROBLEM — S42.002A CLOSED FRACTURE OF LEFT CLAVICLE: Status: RESOLVED | Noted: 2020-10-26 | Resolved: 2021-03-04

## 2021-03-11 ENCOUNTER — OFFICE VISIT (OUTPATIENT)
Dept: PRIMARY CARE CLINIC | Age: 86
End: 2021-03-11
Payer: MEDICARE

## 2021-03-11 VITALS
DIASTOLIC BLOOD PRESSURE: 63 MMHG | TEMPERATURE: 97.9 F | OXYGEN SATURATION: 93 % | SYSTOLIC BLOOD PRESSURE: 97 MMHG | HEART RATE: 82 BPM

## 2021-03-11 DIAGNOSIS — E78.5 HYPERLIPIDEMIA, UNSPECIFIED HYPERLIPIDEMIA TYPE: ICD-10-CM

## 2021-03-11 DIAGNOSIS — N18.32 STAGE 3B CHRONIC KIDNEY DISEASE (HCC): ICD-10-CM

## 2021-03-11 DIAGNOSIS — M47.817 SPONDYLOSIS OF LUMBOSACRAL REGION, UNSPECIFIED SPINAL OSTEOARTHRITIS COMPLICATION STATUS: ICD-10-CM

## 2021-03-11 DIAGNOSIS — G20 PARKINSONISM, UNSPECIFIED PARKINSONISM TYPE (HCC): Primary | ICD-10-CM

## 2021-03-11 DIAGNOSIS — Z51.5 HOSPICE CARE PATIENT: ICD-10-CM

## 2021-03-11 DIAGNOSIS — F03.91 DEMENTIA WITH BEHAVIORAL DISTURBANCE, UNSPECIFIED DEMENTIA TYPE: ICD-10-CM

## 2021-03-11 DIAGNOSIS — R53.81 DEBILITY: ICD-10-CM

## 2021-03-11 DIAGNOSIS — E43 UNSPECIFIED SEVERE PROTEIN-CALORIE MALNUTRITION (HCC): ICD-10-CM

## 2021-03-11 DIAGNOSIS — I10 ESSENTIAL HYPERTENSION: ICD-10-CM

## 2021-03-11 PROCEDURE — 1036F TOBACCO NON-USER: CPT | Performed by: STUDENT IN AN ORGANIZED HEALTH CARE EDUCATION/TRAINING PROGRAM

## 2021-03-11 PROCEDURE — G8417 CALC BMI ABV UP PARAM F/U: HCPCS | Performed by: STUDENT IN AN ORGANIZED HEALTH CARE EDUCATION/TRAINING PROGRAM

## 2021-03-11 PROCEDURE — 4040F PNEUMOC VAC/ADMIN/RCVD: CPT | Performed by: STUDENT IN AN ORGANIZED HEALTH CARE EDUCATION/TRAINING PROGRAM

## 2021-03-11 PROCEDURE — 99349 HOME/RES VST EST MOD MDM 40: CPT | Performed by: STUDENT IN AN ORGANIZED HEALTH CARE EDUCATION/TRAINING PROGRAM

## 2021-03-11 PROCEDURE — 1123F ACP DISCUSS/DSCN MKR DOCD: CPT | Performed by: STUDENT IN AN ORGANIZED HEALTH CARE EDUCATION/TRAINING PROGRAM

## 2021-03-11 PROCEDURE — 1090F PRES/ABSN URINE INCON ASSESS: CPT | Performed by: STUDENT IN AN ORGANIZED HEALTH CARE EDUCATION/TRAINING PROGRAM

## 2021-03-11 PROCEDURE — G8484 FLU IMMUNIZE NO ADMIN: HCPCS | Performed by: STUDENT IN AN ORGANIZED HEALTH CARE EDUCATION/TRAINING PROGRAM

## 2021-03-11 RX ORDER — QUETIAPINE FUMARATE 25 MG/1
25 TABLET, FILM COATED ORAL EVERY 6 HOURS PRN
COMMUNITY
End: 2021-11-18 | Stop reason: SDUPTHER

## 2021-03-11 RX ORDER — LABETALOL 100 MG/1
50 TABLET, FILM COATED ORAL 2 TIMES DAILY
Qty: 30 TABLET | Refills: 5 | Status: SHIPPED
Start: 2021-03-11 | End: 2021-10-19

## 2021-03-11 NOTE — PROGRESS NOTES
[facility]      3/11/2021    Home visit medically necessary in lieu of an office visit due to:  Uses wheelchair, bedbound, needs ambulance to  get out. Chief Complaint: Ritesh Beauchamp is a 81 y.o. female with chief complaint of:   Chief Complaint   Patient presents with    Tremors     Parkinsonism    Dementia       Assessment & Plan      Parkinsonism  · Patient with history of freezing on ambulation and signs of mild bradykinesia on exam however without resting tremor or other nonmovement related Parkinsonian symptoms. · Available previous PCP documentation notes restless leg syndrome but does not note parkinsonism. Granddaughter reports that neurology evaluated and felt her condition was consistent with Parkinson's disease. · Patient has not tried carbidopa-levodopa but has had a trial of ropinirole for RLS with hallucinations. · Unclear if this is a primary Parkinson's disease, related to cerebrovascular disease, or unrelated. · If parkinsonism is truly present, would complicate patient's rehabilitation course. · Could consider whether a trial of carbidopa-levodopa is indicated or desired. Concern that patient would have increased hallucinations if this were started. Dementia with behavioral disturbance  · Patient reported to have vascular dementia per hospice chart however patient and family deny significant cognitive impairment or formal diagnosis in the past.  · Previous PCP records indicate Alzheimer's dementia. · Patient previously had mini cog score 1/5 with abnormal clock draw test.  · Patient does appear to have cognitive impairment clinically and is having hallucinations which could be a behavioral disturbance. · Unclear subtype; patient may have vascular dementia or Alzheimer's dementia and given possible parkinsonism, could have a Parkinson's disease dementia or Lewy body dementia. · Continue supportive care in the home and monitor cognition.   · Attempted to call granddaughter regarding hallucinations and possible medication options. Essential hypertension  · Per history currently on amlodipine, HCTZ, and labetalol. · Blood pressure borderline low but patient asymptomatic. · Given multiple low blood pressures, decrease labetalol. · Will have visiting hospice nurses continue to follow and if remaining low, will consider cutting back on antihypertensives. Severe protein calorie malnutrition  · Patient sarcopenic and cachectic on exam with poor oral intake. · Secondary to patient's advanced multimorbidity including parkinsonism and dementia. · Contributing to physiologic reserve and overall prognosis. · Continue supportive care and encourage oral intake. Hyperlipidemia  · Reported per history but not currently on an anti-lipid medication. · Last lipid panel in 3/2017 with HDL 77 and LDL 94.  · Given concern for limited prognosis, unclear if patient would realize benefit from initiation of a statin. · If prognosis improves, may be reasonable to consider initiation of a statin for secondary prevention of further ischemic cerebrovascular changes. Chronic kidney disease, stage 3b  · Per history likely secondary to longstanding hypertension. · Most recent chemistry in 11/20 with BUN 40 and creatinine 1.2. · Avoid nephrotoxic medications. Lumbosacral spondylosis  · Per history and reportedly previously with chronic pain. · Previous trial of morphine with hospice however did not tolerate. · No pain endorsed today. Debility  · Patient currently bed and wheelchair-bound and requires significant assistance to transfer secondary to falls with hip fracture, interrupted rehabilitation course, complicated by advanced age, deconditioning, and potential neurologic disorders. · Family reports that patient has been getting stronger doing home exercises from physical therapy.   · Patient did have a trial of home PT but was discharged due to lack of progression. · Anticipate patient has poor rehabilitation potential and will continue to decline. Hospice patient  · Patient enrolled in hospice with [facility] for a terminal diagnosis of cerebrovascular disease with vascular dementia. · Patient did not have significant improvement with a home PT course. · Discussed and coordinated with the patient's . · Continue hospice supports. Follow Up:  Return in about 6 weeks (around 4/22/2021), or if symptoms worsen or fail to improve. Subjective:     History of Present Illness: Michael Chan is a 81 y.o. female with significant past medical history of parkinsonism, cerebrovascular disease on imaging, dysphagia, CKD 3, hypertension, hyperlipidemia, anxiety, fall with multiple fractures and secondary debility who was evaluated in her home for management of multiple medical issues. History is obtained from both the patient's  and the patient herself. The patient's  is concerned about hallucinations. He states that she is reaching out and talking to people that are not there every day. She will yell out and be fearful in the morning. It comes and goes all throughout the day. He states that there have been medication changes with hospice. He would like to know if there are any other medications that we can try as these become quite distressing for the patient and her . She will attempt to get out of bed but has not had any falls. He has not noticed any underlying pain or other symptoms. His only other concern is that she has a wound on her left scapula that he attributes to her bed. He was very fixated on this throughout the history. The patient herself states that she does have visual hallucinations described as \"pictures\" of people that are not distressing. She states that she knows that they are not real and that her mind is playing a trick on her.   She notes that she has urinary frequency which by [place] DO Coy at [address] [place] [address]      NERVE BLOCK Left 03/28/2017    leftlumbar transforaminal NB #1     NERVE BLOCK Left 04/04/2017    Left lumbar transforaminal nerve block #2 L4-5, L5-S1    NERVE BLOCK Left 06/13/2017    transforaminal nerve block, lumbar #3    NERVE BLOCK Left 08/15/2017    paravertebral facet left lumbar #1    OTHER SURGICAL HISTORY  02/10/2017    CT guided lumbar spine bone biopsy    PARATHYROID GLAND SURGERY      MN LUMBAR SPINE FUSN,POST INTRBDY N/A 9/13/2018    L3-S1 FUSION, L4-L5, L5-S1  TRANSFORAMINAL LUMBAR INTERBODY FUSION -- NEEDS CAGES, PLATES, SCREWS, O-ARM, AUDIOLOGY, CHIARA TABLE, CELL SAVER, PLATELET GEL - [employer] performed by Duglas Castillo MD at [facility] [place]       Family History   Problem Relation Age of Onset    Diabetes Mother     High Blood Pressure Mother     Parkinsonism Father     Lung Cancer Brother     Heart Disease Brother        Social History     Socioeconomic History    Marital status:      Spouse name: Not on file    Number of children: Not on file    Years of education: Not on file    Highest education level: Not on file   Occupational History    Not on file   Social Needs    Financial resource strain: Not on file    Food insecurity     Worry: Not on file     Inability: Not on file    Transportation needs     Medical: Not on file     Non-medical: Not on file   Tobacco Use    Smoking status: Never Smoker    Smokeless tobacco: Never Used   Substance and Sexual Activity    Alcohol use: No    Drug use: No    Sexual activity: Never   Lifestyle    Physical activity     Days per week: Not on file     Minutes per session: Not on file    Stress: Not on file   Relationships    Social connections     Talks on phone: Not on file     Gets together: Not on file     Attends [employer] service: Not on file     Active member of club or organization: Not on file     Attends meetings of clubs or organizations: Not on file     Relationship status: Not on file    Intimate partner violence     Fear of current or ex partner: Not on file     Emotionally abused: Not on file     Physically abused: Not on file     Forced sexual activity: Not on file   Other Topics Concern    Not on file   Social History Narrative    Not on file       Current Medications:  Medications reviewed: yes    Current Outpatient Medications on File Prior to Visit   Medication Sig Dispense Refill    LORazepam (ATIVAN) 0.5 MG tablet Take 0.5 mg by mouth every evening. And Q6H PRN      acetaminophen (TYLENOL) 325 MG tablet Take 650 mg by mouth every 6 hours as needed for Pain (for mild pain)      aspirin 325 MG EC tablet Take 1 tablet by mouth 2 times daily for 28 days 56 tablet 0    DULoxetine (CYMBALTA) 30 MG extended release capsule Take 30 mg by mouth daily      bisacodyl (DULCOLAX) 10 MG suppository Place 10 mg rectally daily as needed for Constipation      amLODIPine (NORVASC) 5 MG tablet Take 5 mg by mouth daily      B Complex-C-E-Zn (STRESS PLUS ZINC) TABS Take 1 tablet by mouth daily      magnesium hydroxide (MILK OF MAGNESIA) 400 MG/5ML suspension Take by mouth daily as needed for Constipation      hydrochlorothiazide (HYDRODIURIL) 12.5 MG tablet Take 1 tablet by mouth daily 30 tablet 3    labetalol (NORMODYNE) 100 MG tablet Take 100 mg by mouth 2 times daily       No current facility-administered medications on file prior to visit. Review of Systems   Respiratory: Negative for cough and shortness of breath. Cardiovascular: Negative for chest pain. Gastrointestinal: Negative for abdominal pain, constipation and nausea. Genitourinary: Positive for frequency. Negative for dysuria. Musculoskeletal: Negative for arthralgias. Psychiatric/Behavioral: Positive for hallucinations. Negative for dysphoric mood.          Objective:     Physical Exam  BP 97/63   Pulse 82   Temp 97.9 °F (36.6 °C)   SpO2 93% Physical Exam  Vitals signs reviewed. Constitutional:       General: She is not in acute distress. Appearance: She is well-developed. Comments: Thin appearing female lying in bed. HENT:      Head: Normocephalic and atraumatic. Neck:      Thyroid: No thyromegaly. Vascular: No carotid bruit. Cardiovascular:      Rate and Rhythm: Normal rate and regular rhythm. Pulses: Normal pulses. Heart sounds: Normal heart sounds. Pulmonary:      Effort: Pulmonary effort is normal. No respiratory distress. Breath sounds: Normal breath sounds. Abdominal:      General: Bowel sounds are normal. There is no distension. Palpations: Abdomen is soft. There is no hepatomegaly. Tenderness: There is no abdominal tenderness. There is no guarding. Musculoskeletal:      Right lower leg: No edema. Left lower leg: No edema. Lymphadenopathy:      Cervical: No cervical adenopathy. Skin:     General: Skin is warm and dry. Comments: Unable to get patient rolled to appreciate area of concern on left scapula. Neurological:      General: No focal deficit present. Mental Status: She is alert. Mental status is at baseline. Sensory: No sensory deficit. Motor: No tremor or abnormal muscle tone. Deep Tendon Reflexes:      Reflex Scores:       Bicep reflexes are 2+ on the right side and 2+ on the left side. Comments: Mask-like face with restricted affect. Psychiatric:         Attention and Perception: Attention normal.         Mood and Affect: Mood normal. Affect is flat (restricted). Behavior: Behavior normal. Behavior is cooperative. Cognition and Memory: Cognition is impaired. Memory is impaired. Comments: Not currently responding to internal stimuli.          Recent Labs:    Lab Results   Component Value Date    WBC 11.5 11/02/2020    HGB 8.1 (L) 11/02/2020    HCT 25.5 (L) 11/02/2020    MCV 97.7 11/02/2020     11/02/2020    LYMPHOPCT 18.0 (L) 11/02/2020    RBC 2.61 (L) 11/02/2020    MCH 31.0 11/02/2020    MCHC 31.8 (L) 11/02/2020    RDW 16.6 (H) 11/02/2020       Lab Results   Component Value Date     11/02/2020    K 3.7 11/02/2020     11/02/2020    CO2 21 (L) 11/02/2020    BUN 40 (H) 11/02/2020    CREATININE 1.2 (H) 11/02/2020    GLUCOSE 87 11/02/2020    CALCIUM 8.1 (L) 11/02/2020    PROT 5.3 (L) 11/02/2020    LABALBU 2.6 (L) 11/02/2020    BILITOT 1.1 11/02/2020    ALKPHOS 457 (H) 11/02/2020    AST 56 (H) 11/02/2020    ALT 34 (H) 11/02/2020    LABGLOM 43 11/02/2020    GFRAA 52 11/02/2020       Lab Results   Component Value Date    VITD25 61 09/26/2011    TSH 1.080 10/27/2020       No results found for: LABA1C    Lab Results   Component Value Date    INR 1.1 10/25/2020    INR 1.0 12/28/2018    INR 1.0 09/10/2018    PROTIME 11.9 10/25/2020    PROTIME 11.8 12/28/2018    PROTIME 11.1 09/10/2018       Dagoberto Jimenez MD  3/11/2021

## 2021-03-25 ENCOUNTER — IMMUNIZATION (OUTPATIENT)
Dept: PRIMARY CARE CLINIC | Age: 86
End: 2021-03-25
Payer: MEDICARE

## 2021-03-25 DIAGNOSIS — Z23 ENCOUNTER FOR IMMUNIZATION: Primary | ICD-10-CM

## 2021-03-25 PROCEDURE — 91301 COVID-19, MODERNA VACCINE 100MCG/0.5ML DOSE: CPT | Performed by: STUDENT IN AN ORGANIZED HEALTH CARE EDUCATION/TRAINING PROGRAM

## 2021-03-25 PROCEDURE — 0011A COVID-19, MODERNA VACCINE 100MCG/0.5ML DOSE: CPT | Performed by: STUDENT IN AN ORGANIZED HEALTH CARE EDUCATION/TRAINING PROGRAM

## 2021-03-25 NOTE — PROGRESS NOTES
[address] was seen for COVID-19 vaccination at her home as part of the [facility]. The patient was seen for dose 1 of the Moderna vaccine. The EUA was reviewed with the patient and all questions answered. The attestation was reviewed without contraindications to the immunization and the patient consented to the immunization. The patient's medications and allergies were reviewed. The patient was given the vaccine intramuscularly into the right deltoid. The patient tolerated the immunization well without obvious adverse events. The patient was observed for at least 15 minutes. At the time of leaving the home, the patient was doing well and at baseline condition. The patient was given the [facility] COVID-19 vaccination record card and instructed to keep it safe and readily available. The patient was educated regarding the potential side effects and given instructions that for lethargy, lightheadedness, difficulty breathing, or other acute reactions the patient should call hospice on call or 911 for evaluation.     Angélica Haro MD  3/25/2021

## 2021-04-22 ENCOUNTER — OFFICE VISIT (OUTPATIENT)
Dept: PRIMARY CARE CLINIC | Age: 86
End: 2021-04-22
Payer: MEDICARE

## 2021-04-22 ENCOUNTER — IMMUNIZATION (OUTPATIENT)
Dept: PRIMARY CARE CLINIC | Age: 86
End: 2021-04-22
Payer: MEDICARE

## 2021-04-22 VITALS
SYSTOLIC BLOOD PRESSURE: 112 MMHG | HEART RATE: 60 BPM | TEMPERATURE: 97.5 F | OXYGEN SATURATION: 97 % | DIASTOLIC BLOOD PRESSURE: 71 MMHG

## 2021-04-22 DIAGNOSIS — N18.32 STAGE 3B CHRONIC KIDNEY DISEASE (HCC): ICD-10-CM

## 2021-04-22 DIAGNOSIS — E78.5 HYPERLIPIDEMIA, UNSPECIFIED HYPERLIPIDEMIA TYPE: ICD-10-CM

## 2021-04-22 DIAGNOSIS — G20 PARKINSONISM, UNSPECIFIED PARKINSONISM TYPE (HCC): Primary | ICD-10-CM

## 2021-04-22 DIAGNOSIS — Z51.5 HOSPICE CARE PATIENT: ICD-10-CM

## 2021-04-22 DIAGNOSIS — E43 UNSPECIFIED SEVERE PROTEIN-CALORIE MALNUTRITION (HCC): ICD-10-CM

## 2021-04-22 DIAGNOSIS — M47.817 SPONDYLOSIS OF LUMBOSACRAL REGION, UNSPECIFIED SPINAL OSTEOARTHRITIS COMPLICATION STATUS: ICD-10-CM

## 2021-04-22 DIAGNOSIS — I10 ESSENTIAL HYPERTENSION: ICD-10-CM

## 2021-04-22 DIAGNOSIS — R53.81 DEBILITY: ICD-10-CM

## 2021-04-22 DIAGNOSIS — F03.91 DEMENTIA WITH BEHAVIORAL DISTURBANCE, UNSPECIFIED DEMENTIA TYPE: ICD-10-CM

## 2021-04-22 DIAGNOSIS — G62.9 NEUROPATHY: ICD-10-CM

## 2021-04-22 DIAGNOSIS — Z23 ENCOUNTER FOR IMMUNIZATION: Primary | ICD-10-CM

## 2021-04-22 PROCEDURE — 1036F TOBACCO NON-USER: CPT | Performed by: STUDENT IN AN ORGANIZED HEALTH CARE EDUCATION/TRAINING PROGRAM

## 2021-04-22 PROCEDURE — 0012A COVID-19, MODERNA VACCINE 100MCG/0.5ML DOSE: CPT | Performed by: STUDENT IN AN ORGANIZED HEALTH CARE EDUCATION/TRAINING PROGRAM

## 2021-04-22 PROCEDURE — 99349 HOME/RES VST EST MOD MDM 40: CPT | Performed by: STUDENT IN AN ORGANIZED HEALTH CARE EDUCATION/TRAINING PROGRAM

## 2021-04-22 PROCEDURE — G8417 CALC BMI ABV UP PARAM F/U: HCPCS | Performed by: STUDENT IN AN ORGANIZED HEALTH CARE EDUCATION/TRAINING PROGRAM

## 2021-04-22 PROCEDURE — 1090F PRES/ABSN URINE INCON ASSESS: CPT | Performed by: STUDENT IN AN ORGANIZED HEALTH CARE EDUCATION/TRAINING PROGRAM

## 2021-04-22 PROCEDURE — 4040F PNEUMOC VAC/ADMIN/RCVD: CPT | Performed by: STUDENT IN AN ORGANIZED HEALTH CARE EDUCATION/TRAINING PROGRAM

## 2021-04-22 PROCEDURE — 91301 COVID-19, MODERNA VACCINE 100MCG/0.5ML DOSE: CPT | Performed by: STUDENT IN AN ORGANIZED HEALTH CARE EDUCATION/TRAINING PROGRAM

## 2021-04-22 PROCEDURE — 1123F ACP DISCUSS/DSCN MKR DOCD: CPT | Performed by: STUDENT IN AN ORGANIZED HEALTH CARE EDUCATION/TRAINING PROGRAM

## 2021-04-22 RX ORDER — GABAPENTIN 100 MG/1
100 CAPSULE ORAL EVERY EVENING
COMMUNITY

## 2021-04-22 ASSESSMENT — ENCOUNTER SYMPTOMS
DIARRHEA: 0
TROUBLE SWALLOWING: 0
CONSTIPATION: 0
ABDOMINAL DISTENTION: 0
SHORTNESS OF BREATH: 0
BACK PAIN: 0
NAUSEA: 0
COUGH: 0
ABDOMINAL PAIN: 0

## 2021-04-22 NOTE — PROGRESS NOTES
[facility]      4/22/2021    Home visit medically necessary in lieu of an office visit due to:  Uses wheelchair, bedbound, needs ambulance to  get out. Chief Complaint: Jerad Beckman is a 81 y.o. female with chief complaint of:   Chief Complaint   Patient presents with    Dementia    Tremors     Parkinsonism       Assessment & Plan      Parkinsonism  · Patient with history of freezing on ambulation and signs of mild bradykinesia on exam however without resting tremor or other nonmovement related Parkinsonian symptoms. · Available previous PCP documentation notes restless leg syndrome but does not note parkinsonism. Granddaughter reports that neurology evaluated and felt her condition was consistent with Parkinson's disease. · Patient has not tried carbidopa-levodopa but has had a trial of ropinirole for RLS with hallucinations. Concern that patient would have increased hallucinations if carbidopa-levodopa were started. · Unclear if this is a primary Parkinson's disease, related to cerebrovascular disease, or unrelated. · Continue supportive care and monitor clinically.     Dementia with behavioral disturbance  · Patient reported to have vascular dementia per hospice chart however patient and family deny significant cognitive impairment or formal diagnosis in the past.  · Previous PCP records indicate Alzheimer's dementia. · Patient previously had mini cog score 1/5 with abnormal clock draw test.  · Patient does appear to have cognitive impairment clinically and has intermittent hallucinations which could be a behavioral disturbance. · Unclear subtype; patient may have vascular dementia or Alzheimer's dementia and given possible parkinsonism, could have a Parkinson's disease dementia or Lewy body dementia. · Continue supportive care in the home and monitor cognition.   · Attempted to call granddaughter regarding hallucinations and possible medication options.    Essential hypertension  · Per history currently on amlodipine, HCTZ, and labetalol. · Blood pressure controlled today. · Discussed possibility of cutting back on antihypertensives to decrease pill burden as patient's pressures are controlled; patient wishes to continue current regimen. Neuropathy  · Patient endorses a burning pain in her bilateral feet consistent with neuropathy. · Possibly related to spondylosis. · Patient was prescribed gabapentin by hospice but has not started this due to concerns over side effects; encouraged giving it a trial and discussed that it is generally very well-tolerated. Severe protein calorie malnutrition  · Patient sarcopenic and cachectic on exam with poor oral intake. · Secondary to patient's advanced multimorbidity including parkinsonism and dementia. · Contributing to physiologic reserve and overall prognosis. · Continue supportive care and encourage oral intake.     Hyperlipidemia  · Reported per history but not currently on an anti-lipid medication. · Last lipid panel in 3/2017 with HDL 77 and LDL 94.  · Given concern for limited prognosis, unclear if patient would realize benefit from initiation of a statin. · If prognosis improves, may be reasonable to consider initiation of a statin for secondary prevention of further ischemic cerebrovascular changes.     Chronic kidney disease, stage 3b  · Per history likely secondary to longstanding hypertension. · Most recent chemistry in 11/20 with BUN 40 and creatinine 1.2. · Avoid nephrotoxic medications.     Lumbosacral spondylosis  · Per history and reportedly previously with chronic pain. · Previous trial of morphine with hospice however did not tolerate.   · No pain endorsed today.     Debility  · Patient currently bed and wheelchair-bound and requires significant assistance to transfer secondary to falls with hip fracture, interrupted rehabilitation course, complicated by advanced age, deconditioning, and potential neurologic disorders. · Family reports that patient has been getting stronger doing home exercises from physical therapy. · Patient did have a trial of home PT but was discharged due to lack of progression. · Anticipate patient has poor rehabilitation potential and will continue to decline.     Hospice patient  · Patient enrolled in hospice with [facility] for a terminal diagnosis of cerebrovascular disease with vascular dementia. · Patient did not have significant improvement with a home PT course. · Continue hospice supports. Follow Up:  Return in about 6 weeks (around 6/3/2021), or if symptoms worsen or fail to improve. Subjective:     History of Present Illness: Brian Gallagher is a 81 y.o. female with significant past medical history of parkinsonism, cerebrovascular disease on imaging, dysphagia, CKD 3, hypertension, hyperlipidemia, anxiety, fall with multiple fractures and secondary debility who was evaluated in her home for management of multiple medical issues. The patient states that things are about the same. She says it is the same issues that bother her. She wishes that she could walk and this is frustrating to her. She says that she has a lot of help coming in including her aids and they were able to get her up to the wheelchair. She overall feels that her strength is doing well and that she can help with transfers. She feels she has good core strength when sitting in the wheelchair and does not fall over. She endorses a burning foot pain that is a longstanding issue. She states it is worse at the great toes bilaterally and that these are curled which makes it difficult to walk. It is worsened with trying to stand on her feet. She was unable to provide a specific severity. She was recently prescribed gabapentin by hospice but has not started it; she states she wants to read about it before starting.   Provided encouragement to give the medicine a trial and light-headedness (with positional change) and numbness. Psychiatric/Behavioral: Positive for dysphoric mood (occasional). The patient is nervous/anxious (occasional worry). Objective:     Physical Exam  /71   Pulse 60   Temp 97.5 °F (36.4 °C)   SpO2 97%     Physical Exam  Vitals signs reviewed. Constitutional:       General: She is not in acute distress. Appearance: She is well-developed. Comments: Thin appearing female lying in bed, in no distress, sarcopenic. HENT:      Head: Normocephalic and atraumatic. Mouth/Throat:      Mouth: Mucous membranes are moist.   Eyes:      Pupils: Pupils are equal, round, and reactive to light. Neck:      Thyroid: No thyromegaly. Vascular: No carotid bruit. Cardiovascular:      Rate and Rhythm: Normal rate and regular rhythm. Pulses: Normal pulses. Heart sounds: Normal heart sounds. Pulmonary:      Effort: Pulmonary effort is normal. No respiratory distress. Breath sounds: Normal breath sounds. No rales. Abdominal:      General: Bowel sounds are normal. There is no distension. Palpations: Abdomen is soft. Tenderness: There is no abdominal tenderness. There is no guarding. Musculoskeletal:      Right lower leg: No edema. Left lower leg: No edema. Lymphadenopathy:      Cervical: No cervical adenopathy. Skin:     General: Skin is warm and dry. Neurological:      General: No focal deficit present. Mental Status: She is alert. Mental status is at baseline. Motor: No tremor or abnormal muscle tone. Deep Tendon Reflexes:      Reflex Scores:       Bicep reflexes are 2+ on the right side and 2+ on the left side. Comments: Mask-like face with restricted affect. Psychiatric:         Attention and Perception: Attention normal.         Mood and Affect: Mood normal. Affect is flat (restricted). Behavior: Behavior normal. Behavior is cooperative.          Cognition and Memory: Cognition is impaired. Memory is impaired. Comments: No obvious hallucinations.          Recent Labs:    Lab Results   Component Value Date    WBC 11.5 11/02/2020    HGB 8.1 (L) 11/02/2020    HCT 25.5 (L) 11/02/2020    MCV 97.7 11/02/2020     11/02/2020    LYMPHOPCT 18.0 (L) 11/02/2020    RBC 2.61 (L) 11/02/2020    MCH 31.0 11/02/2020    MCHC 31.8 (L) 11/02/2020    RDW 16.6 (H) 11/02/2020       Lab Results   Component Value Date     11/02/2020    K 3.7 11/02/2020     11/02/2020    CO2 21 (L) 11/02/2020    BUN 40 (H) 11/02/2020    CREATININE 1.2 (H) 11/02/2020    GLUCOSE 87 11/02/2020    CALCIUM 8.1 (L) 11/02/2020    PROT 5.3 (L) 11/02/2020    LABALBU 2.6 (L) 11/02/2020    BILITOT 1.1 11/02/2020    ALKPHOS 457 (H) 11/02/2020    AST 56 (H) 11/02/2020    ALT 34 (H) 11/02/2020    LABGLOM 43 11/02/2020    GFRAA 52 11/02/2020       Lab Results   Component Value Date    VITD25 61 09/26/2011    TSH 1.080 10/27/2020       No results found for: LABA1C    Lab Results   Component Value Date    INR 1.1 10/25/2020    INR 1.0 12/28/2018    INR 1.0 09/10/2018    PROTIME 11.9 10/25/2020    PROTIME 11.8 12/28/2018    PROTIME 11.1 09/10/2018       Michelle Bustillo MD  4/22/2021

## 2021-05-03 PROBLEM — E43 UNSPECIFIED SEVERE PROTEIN-CALORIE MALNUTRITION (HCC): Status: ACTIVE | Noted: 2021-05-03

## 2021-05-03 ASSESSMENT — ENCOUNTER SYMPTOMS
SHORTNESS OF BREATH: 0
ABDOMINAL PAIN: 0
COUGH: 0
CONSTIPATION: 0
NAUSEA: 0

## 2021-06-03 ENCOUNTER — OFFICE VISIT (OUTPATIENT)
Dept: PRIMARY CARE CLINIC | Age: 86
End: 2021-06-03
Payer: MEDICARE

## 2021-06-03 DIAGNOSIS — N18.32 STAGE 3B CHRONIC KIDNEY DISEASE (HCC): ICD-10-CM

## 2021-06-03 DIAGNOSIS — E43 UNSPECIFIED SEVERE PROTEIN-CALORIE MALNUTRITION (HCC): ICD-10-CM

## 2021-06-03 DIAGNOSIS — G20 PARKINSONISM, UNSPECIFIED PARKINSONISM TYPE (HCC): Primary | ICD-10-CM

## 2021-06-03 DIAGNOSIS — Z51.5 HOSPICE CARE PATIENT: ICD-10-CM

## 2021-06-03 DIAGNOSIS — I10 ESSENTIAL HYPERTENSION: ICD-10-CM

## 2021-06-03 DIAGNOSIS — G62.9 NEUROPATHY: ICD-10-CM

## 2021-06-03 DIAGNOSIS — M47.817 SPONDYLOSIS OF LUMBOSACRAL REGION, UNSPECIFIED SPINAL OSTEOARTHRITIS COMPLICATION STATUS: ICD-10-CM

## 2021-06-03 DIAGNOSIS — F03.91 DEMENTIA WITH BEHAVIORAL DISTURBANCE, UNSPECIFIED DEMENTIA TYPE: ICD-10-CM

## 2021-06-03 DIAGNOSIS — E78.5 HYPERLIPIDEMIA, UNSPECIFIED HYPERLIPIDEMIA TYPE: ICD-10-CM

## 2021-06-03 DIAGNOSIS — R53.81 DEBILITY: ICD-10-CM

## 2021-06-03 PROCEDURE — 1036F TOBACCO NON-USER: CPT | Performed by: STUDENT IN AN ORGANIZED HEALTH CARE EDUCATION/TRAINING PROGRAM

## 2021-06-03 PROCEDURE — 99349 HOME/RES VST EST MOD MDM 40: CPT | Performed by: STUDENT IN AN ORGANIZED HEALTH CARE EDUCATION/TRAINING PROGRAM

## 2021-06-03 PROCEDURE — 1123F ACP DISCUSS/DSCN MKR DOCD: CPT | Performed by: STUDENT IN AN ORGANIZED HEALTH CARE EDUCATION/TRAINING PROGRAM

## 2021-06-03 PROCEDURE — G8417 CALC BMI ABV UP PARAM F/U: HCPCS | Performed by: STUDENT IN AN ORGANIZED HEALTH CARE EDUCATION/TRAINING PROGRAM

## 2021-06-03 PROCEDURE — 4040F PNEUMOC VAC/ADMIN/RCVD: CPT | Performed by: STUDENT IN AN ORGANIZED HEALTH CARE EDUCATION/TRAINING PROGRAM

## 2021-06-03 PROCEDURE — 1090F PRES/ABSN URINE INCON ASSESS: CPT | Performed by: STUDENT IN AN ORGANIZED HEALTH CARE EDUCATION/TRAINING PROGRAM

## 2021-06-03 NOTE — PROGRESS NOTES
[facility]      6/3/2021    Home visit medically necessary in lieu of an office visit due to:  Uses wheelchair, bedbound, needs ambulance to  get out. Chief Complaint: Tami Irwin is a 81 y.o. female with chief complaint of:   Chief Complaint   Patient presents with    Leg Pain    Extremity Weakness       Assessment & Plan      Parkinsonism  · Patient with history of freezing on ambulation and signs of mild bradykinesia on exam however without resting tremor or other nonmovement related Parkinsonian symptoms. · Available previous PCP documentation notes restless leg syndrome but does not note parkinsonism. Granddaughter reports that neurology evaluated and felt her condition was consistent with Parkinson's disease. · Patient has not tried carbidopa-levodopa but has had a trial of ropinirole for RLS with hallucinations. Concern that patient would have increased hallucinations if carbidopa-levodopa were started. · Unclear if this is a primary Parkinson's disease, related to cerebrovascular disease, or unrelated. · Patient has had ongoing functional decline likely related to progression of her underlying disease and has had increasing hallucinations likely due to underlying dementia process which may have a Parkinson's component. · Continue supportive care and monitor clinically.     Dementia with behavioral disturbance  · Patient reported to have vascular dementia per hospice chart however patient and family deny significant cognitive impairment or formal diagnosis in the past.  · Previous PCP records indicate Alzheimer's dementia. · Patient previously had mini-cog score 1/5 with abnormal clock draw test.  · Patient does appear to have cognitive impairment clinically and has intermittent hallucinations which could be a behavioral disturbance.   · Unclear subtype; patient may have vascular dementia or Alzheimer's dementia and given possible parkinsonism, could have a Parkinson's disease dementia or Lewy body dementia. · Patient has lorazepam and quetiapine available in the home however family has been resistant to give these. Encouraged trial if hallucinations are causing distress. · Continue supportive care in the home and monitor cognition.     Essential hypertension  · Per history currently on amlodipine, HCTZ, and labetalol. · Blood pressure controlled today. · Discussed possibility of cutting back on antihypertensives to decrease pill burden as patient's pressures are controlled; patient wishes to continue current regimen and is generally resistant to changes to medications. Neuropathy  · Patient endorses a burning pain in her bilateral feet consistent with neuropathy. · Possibly related to spondylosis. · Recently had gabapentin increased to 200 mg nightly by hospice but family attributed her hallucinations to it and reduced it back to 100 mg. Hallucinations more likely related to the dementia than her medications, but will monitor on 100 mg. Severe protein calorie malnutrition  · Patient sarcopenic and cachectic on exam with poor oral intake. · Secondary to patient's advanced multimorbidity including parkinsonism and dementia. · Contributing to physiologic reserve and overall prognosis. · Continue supportive care and encourage oral intake.     Hyperlipidemia  · Reported per history but not currently on an anti-lipid medication. · Last lipid panel in 3/2017 with HDL 77 and LDL 94.  · Given concern for limited prognosis, unclear if patient would realize benefit from initiation of a statin. · If prognosis improves, may be reasonable to consider initiation of a statin for secondary prevention of further ischemic cerebrovascular changes.     Chronic kidney disease, stage 3b  · Per history likely secondary to longstanding hypertension. · Most recent chemistry in 11/2020 with BUN 40 and creatinine 1.2.   · Avoid nephrotoxic medications.     Lumbosacral spondylosis  · Per history and reportedly previously with chronic pain. · Previous trial of morphine with hospice however did not tolerate. · Continue to monitor and offer over-the-counter analgesics and consideration of another trial of opioids if patient and family willing.     Debility  · Patient currently bed and wheelchair-bound and requires significant assistance to transfer secondary to falls with hip fracture, interrupted rehabilitation course, complicated by advanced age, deconditioning, and potential neurologic disorders. · Family reports that patient has been getting stronger doing home exercises from physical therapy. · Patient did have a trial of home PT but was discharged due to lack of response. · Anticipate patient has poor rehabilitation potential and will continue to decline.     Hospice patient  · Patient enrolled in hospice with [facility] for a terminal diagnosis of cerebrovascular disease with vascular dementia. · Patient did not have significant improvement with a home PT course. · Continue hospice supports. Follow Up:  Return in about 8 weeks (around 7/29/2021), or if symptoms worsen or fail to improve. Subjective:     History of Present Illness: Rogelio Cotto is a 81 y.o. female with significant past medical history of parkinsonism, cerebrovascular disease on imaging, dysphagia, CKD 3, hypertension, hyperlipidemia, anxiety, fall with multiple fractures and secondary debility who was evaluated in her home for management of multiple medical issues. The patient states that she has had pain in her legs. Her toes are curled under her feet which rates it hard to walk. The pain is worsened with walking. Hospice recently increased her gabapentin to 200 mg nightly. The patient did take this but then family felt that her confusion and hallucinations worsened so they have stopped it back to 100 mg.   She has not been using the lorazepam or quetiapine for her hallucinations. She and her  have confusion about her medications. Other issues include headaches which she relates to pain in her back and neck related to osteoporosis and degenerative disc disease. She also has generalized aches that she relates to getting older. She does have depressed mood. The patient spent most of her time in bed and will watch out the window for scenery. She is able to get out of bed and into a wheelchair with support but does not get into the wheelchair every day. She continues to have private duty aides to assist at home. She does feel weaker overall, and her  notes that she has essentially carried into her wheelchair. Her appetite is doing well and she eats about 2 meals per day. She feels she has put on some weight. She has had some dysphagia and had a choking episode a couple weeks ago while eating steak.       Functional Status (I: Independent, A: Assisted, D: Dependent)  ADLs I A D Notes   Bathing []  [x]  []     Dressing []  []  [x]     Toileting []  []  [x]     Transfers []  []  [x]     Feeding []  [x]  []     Ambulation []  []  [x]  Assistive devices: wheelchair   IADLs I A D    Telephone [x]  []  []     Transportation []  []  [x]  Driving safety concerns: NA patient does not drive    Shopping []  []  [x]     Meal prep []  []  [x]     Housework []  []  [x]     Medications []  []  [x]     Finances []  []  [x]           Past Medical History:   Diagnosis Date    Anxiety     Arthritis     Back pain     CKD (chronic kidney disease)     stage 3    Closed fracture of left clavicle 10/26/2020    Closed fracture of surgical neck of left humerus 10/26/2020    Closed intertrochanteric fracture of hip, left, initial encounter ([facility] [address] 10/26/2020    Depression     Fracture of left humerus     Hx of falling     Hyperlipidemia     Hypertension     Non-traumatic compression fracture of eleventh thoracic vertebra ([facility] [address] 12/28/2018    Osteoporosis     Weakness Past Surgical History:   Procedure Laterality Date    ARM SURGERY Left 8/4/2020    PARTIAL OSTECTOMY LEFT HUMERUS (CPT 69256) performed by Luz Maria Dumont DO at [address] [facility] [place], DIAGNOSTIC      EYE SURGERY      carmelo lense implants    HIP FRACTURE SURGERY Left 10/26/2020    HIP OPEN REDUCTION INTERNAL FIXATION performed by Raegan Rankin DO at [address] [place] [address]      NERVE BLOCK Left 03/28/2017    leftlumbar transforaminal NB #1     NERVE BLOCK Left 04/04/2017    Left lumbar transforaminal nerve block #2 L4-5, L5-S1    NERVE BLOCK Left 06/13/2017    transforaminal nerve block, lumbar #3    NERVE BLOCK Left 08/15/2017    paravertebral facet left lumbar #1    OTHER SURGICAL HISTORY  02/10/2017    CT guided lumbar spine bone biopsy    PARATHYROID GLAND SURGERY      NE LUMBAR SPINE FUSN,POST INTRBDY N/A 9/13/2018    L3-S1 FUSION, L4-L5, L5-S1  TRANSFORAMINAL LUMBAR INTERBODY FUSION -- NEEDS CAGES, PLATES, SCREWS, O-ARM, AUDIOLOGY, CHIARA TABLE, CELL SAVER, PLATELET GEL - [employer] performed by Mohit Dial MD at [facility]       Family History   Problem Relation Age of Onset    Diabetes Mother     High Blood Pressure Mother     Parkinsonism Father     Lung Cancer Brother     Heart Disease Brother        Social History     Socioeconomic History    Marital status:      Spouse name: Not on file    Number of children: Not on file    Years of education: Not on file    Highest education level: Not on file   Occupational History    Not on file   Tobacco Use    Smoking status: Never Smoker    Smokeless tobacco: Never Used   Vaping Use    Vaping Use: Never used   Substance and Sexual Activity    Alcohol use: No    Drug use: No    Sexual activity: Never   Other Topics Concern    Not on file   Social History Narrative    Not on file     Social Determinants of Health     Financial Resource Strain:     Difficulty of Paying Living Expenses:    Food Insecurity:     Worried About [facility] in the Last Year:     [facility] [address] in the Last Year:    Transportation Needs:     Lack of Transportation (Medical):  Lack of Transportation (Non-Medical):    Physical Activity:     Days of Exercise per Week:     Minutes of Exercise per Session:    Stress:     Feeling of Stress :    Social Connections:     Frequency of Communication with Friends and Family:     Frequency of Social Gatherings with Friends and Family:     Attends Congregation Services:     Active Member of Clubs or Organizations:     Attends Club or Organization Meetings:     Marital Status:    Intimate Partner Violence:     Fear of Current or Ex-Partner:     Emotionally Abused:     Physically Abused:     Sexually Abused:        Current Medications:  Medications reviewed: yes    Current Outpatient Medications on File Prior to Visit   Medication Sig Dispense Refill    gabapentin (NEURONTIN) 100 MG capsule Take 100 mg by mouth every evening.       QUEtiapine (SEROQUEL) 25 MG tablet Take 25 mg by mouth every 6 hours as needed      labetalol (NORMODYNE) 100 MG tablet Take 0.5 tablets by mouth 2 times daily 30 tablet 5    acetaminophen (TYLENOL) 325 MG tablet Take 650 mg by mouth every 6 hours as needed for Pain (for mild pain)      aspirin 325 MG EC tablet Take 1 tablet by mouth 2 times daily for 28 days 56 tablet 0    DULoxetine (CYMBALTA) 30 MG extended release capsule Take 30 mg by mouth daily      bisacodyl (DULCOLAX) 10 MG suppository Place 10 mg rectally daily as needed for Constipation      amLODIPine (NORVASC) 5 MG tablet Take 5 mg by mouth daily      B Complex-C-E-Zn (STRESS PLUS ZINC) TABS Take 1 tablet by mouth daily      magnesium hydroxide (MILK OF MAGNESIA) 400 MG/5ML suspension Take by mouth daily as needed for Constipation      hydrochlorothiazide (HYDRODIURIL) 12.5 MG tablet Take 1 tablet by mouth daily 30 tablet 3     No current facility-administered medications on file prior to visit. Review of Systems   Constitutional: Negative for appetite change and fever. HENT: Positive for trouble swallowing. Respiratory: Positive for shortness of breath. Negative for cough. Cardiovascular: Negative for chest pain. Gastrointestinal: Negative for abdominal pain, constipation and nausea. Genitourinary: Negative for dysuria and frequency. Musculoskeletal: Positive for arthralgias, back pain and myalgias. Neurological: Positive for numbness and headaches. Negative for light-headedness. Psychiatric/Behavioral: Positive for dysphoric mood. Objective:     Physical Exam  /63   Pulse 64   Temp 97.5 °F (36.4 °C)   SpO2 95%     Physical Exam  Vitals reviewed. Constitutional:       General: She is not in acute distress. Appearance: She is well-developed. Comments: Thin appearing female lying in bed, in no distress, sarcopenic, frail. HENT:      Head: Normocephalic and atraumatic. Mouth/Throat:      Mouth: Mucous membranes are moist.   Eyes:      Pupils: Pupils are equal, round, and reactive to light. Neck:      Thyroid: No thyromegaly. Cardiovascular:      Rate and Rhythm: Normal rate and regular rhythm. Pulses: Normal pulses. Heart sounds: Normal heart sounds. Pulmonary:      Effort: Pulmonary effort is normal. No respiratory distress. Breath sounds: Normal breath sounds. No rales. Abdominal:      General: Bowel sounds are normal. There is no distension. Palpations: Abdomen is soft. Tenderness: There is no abdominal tenderness. Musculoskeletal:      Right lower leg: No edema. Left lower leg: No edema. Lymphadenopathy:      Cervical: No cervical adenopathy. Skin:     General: Skin is warm and dry. Neurological:      General: No focal deficit present. Mental Status: She is alert. Mental status is at baseline.       Sensory: Sensory deficit (legs decreased) present. Motor: Abnormal muscle tone (increased with contractures) present. No tremor. Deep Tendon Reflexes:      Reflex Scores:       Bicep reflexes are 2+ on the right side and 2+ on the left side. Comments: Mask-like face with restricted affect. Foot drop both legs. Psychiatric:         Attention and Perception: Attention normal.         Mood and Affect: Mood is depressed. Affect is flat (restricted). Behavior: Behavior is slowed. Behavior is cooperative. Cognition and Memory: Cognition is impaired. Memory is impaired.       Comments: No obvious hallucinations during exam.         Recent Labs:    Lab Results   Component Value Date    WBC 11.5 11/02/2020    HGB 8.1 (L) 11/02/2020    HCT 25.5 (L) 11/02/2020    MCV 97.7 11/02/2020     11/02/2020    LYMPHOPCT 18.0 (L) 11/02/2020    RBC 2.61 (L) 11/02/2020    MCH 31.0 11/02/2020    MCHC 31.8 (L) 11/02/2020    RDW 16.6 (H) 11/02/2020       Lab Results   Component Value Date     11/02/2020    K 3.7 11/02/2020     11/02/2020    CO2 21 (L) 11/02/2020    BUN 40 (H) 11/02/2020    CREATININE 1.2 (H) 11/02/2020    GLUCOSE 87 11/02/2020    CALCIUM 8.1 (L) 11/02/2020    PROT 5.3 (L) 11/02/2020    LABALBU 2.6 (L) 11/02/2020    BILITOT 1.1 11/02/2020    ALKPHOS 457 (H) 11/02/2020    AST 56 (H) 11/02/2020    ALT 34 (H) 11/02/2020    LABGLOM 43 11/02/2020    GFRAA 52 11/02/2020       Lab Results   Component Value Date    VITD25 61 09/26/2011    TSH 1.080 10/27/2020       No results found for: LABA1C    Lab Results   Component Value Date    INR 1.1 10/25/2020    INR 1.0 12/28/2018    INR 1.0 09/10/2018    PROTIME 11.9 10/25/2020    PROTIME 11.8 12/28/2018    PROTIME 11.1 09/10/2018       Karlos Darden MD  6/3/2021

## 2021-07-29 ENCOUNTER — OFFICE VISIT (OUTPATIENT)
Dept: PRIMARY CARE CLINIC | Age: 86
End: 2021-07-29
Payer: MEDICARE

## 2021-07-29 VITALS
OXYGEN SATURATION: 93 % | DIASTOLIC BLOOD PRESSURE: 51 MMHG | HEART RATE: 77 BPM | SYSTOLIC BLOOD PRESSURE: 79 MMHG | TEMPERATURE: 97.3 F

## 2021-07-29 DIAGNOSIS — N18.32 STAGE 3B CHRONIC KIDNEY DISEASE (HCC): ICD-10-CM

## 2021-07-29 DIAGNOSIS — G20 PARKINSONISM, UNSPECIFIED PARKINSONISM TYPE (HCC): Primary | ICD-10-CM

## 2021-07-29 DIAGNOSIS — E78.5 HYPERLIPIDEMIA, UNSPECIFIED HYPERLIPIDEMIA TYPE: ICD-10-CM

## 2021-07-29 DIAGNOSIS — Z51.5 HOSPICE CARE PATIENT: ICD-10-CM

## 2021-07-29 DIAGNOSIS — F03.91 DEMENTIA WITH BEHAVIORAL DISTURBANCE, UNSPECIFIED DEMENTIA TYPE: ICD-10-CM

## 2021-07-29 DIAGNOSIS — R53.81 DEBILITY: ICD-10-CM

## 2021-07-29 DIAGNOSIS — F41.9 ANXIETY: ICD-10-CM

## 2021-07-29 DIAGNOSIS — K59.00 CONSTIPATION, UNSPECIFIED CONSTIPATION TYPE: ICD-10-CM

## 2021-07-29 DIAGNOSIS — M47.817 SPONDYLOSIS OF LUMBOSACRAL REGION, UNSPECIFIED SPINAL OSTEOARTHRITIS COMPLICATION STATUS: ICD-10-CM

## 2021-07-29 DIAGNOSIS — I10 ESSENTIAL HYPERTENSION: ICD-10-CM

## 2021-07-29 DIAGNOSIS — E43 UNSPECIFIED SEVERE PROTEIN-CALORIE MALNUTRITION (HCC): ICD-10-CM

## 2021-07-29 DIAGNOSIS — G62.9 NEUROPATHY: ICD-10-CM

## 2021-07-29 PROCEDURE — G8417 CALC BMI ABV UP PARAM F/U: HCPCS | Performed by: STUDENT IN AN ORGANIZED HEALTH CARE EDUCATION/TRAINING PROGRAM

## 2021-07-29 PROCEDURE — 1090F PRES/ABSN URINE INCON ASSESS: CPT | Performed by: STUDENT IN AN ORGANIZED HEALTH CARE EDUCATION/TRAINING PROGRAM

## 2021-07-29 PROCEDURE — 4040F PNEUMOC VAC/ADMIN/RCVD: CPT | Performed by: STUDENT IN AN ORGANIZED HEALTH CARE EDUCATION/TRAINING PROGRAM

## 2021-07-29 PROCEDURE — 1036F TOBACCO NON-USER: CPT | Performed by: STUDENT IN AN ORGANIZED HEALTH CARE EDUCATION/TRAINING PROGRAM

## 2021-07-29 PROCEDURE — 99349 HOME/RES VST EST MOD MDM 40: CPT | Performed by: STUDENT IN AN ORGANIZED HEALTH CARE EDUCATION/TRAINING PROGRAM

## 2021-07-29 PROCEDURE — 1123F ACP DISCUSS/DSCN MKR DOCD: CPT | Performed by: STUDENT IN AN ORGANIZED HEALTH CARE EDUCATION/TRAINING PROGRAM

## 2021-07-29 RX ORDER — DULOXETIN HYDROCHLORIDE 30 MG/1
60 CAPSULE, DELAYED RELEASE ORAL DAILY
Qty: 60 CAPSULE | Refills: 3 | Status: SHIPPED | OUTPATIENT
Start: 2021-07-29

## 2021-07-29 RX ORDER — SENNA AND DOCUSATE SODIUM 50; 8.6 MG/1; MG/1
2 TABLET, FILM COATED ORAL 2 TIMES DAILY
Qty: 60 TABLET | Refills: 3 | Status: SHIPPED | OUTPATIENT
Start: 2021-07-29

## 2021-07-29 RX ORDER — SENNA AND DOCUSATE SODIUM 50; 8.6 MG/1; MG/1
2 TABLET, FILM COATED ORAL 2 TIMES DAILY
COMMUNITY
End: 2021-07-29 | Stop reason: SDUPTHER

## 2021-07-29 ASSESSMENT — ENCOUNTER SYMPTOMS
ABDOMINAL PAIN: 0
CONSTIPATION: 1
NAUSEA: 0
BACK PAIN: 1
SHORTNESS OF BREATH: 1

## 2021-07-29 NOTE — PROGRESS NOTES
[facility]      7/29/2021    Home visit medically necessary in lieu of an office visit due to:  Uses wheelchair, bedbound, needs ambulance to  get out. Chief Complaint: Jose C Hwang is a 81 y.o. female with chief complaint of:   Chief Complaint   Patient presents with    Dementia    Hallucinations       Assessment & Plan      Parkinsonism  · Patient with history of freezing on ambulation and signs of mild bradykinesia on exam however without resting tremor or other nonmovement related Parkinsonian symptoms. · Available previous PCP documentation notes restless leg syndrome but does not note parkinsonism. Granddaughter reports that neurology evaluated and felt her condition was consistent with Parkinson's disease. · Patient has not tried carbidopa-levodopa but has had a trial of ropinirole for RLS with hallucinations. Concern that patient would have increased hallucinations if carbidopa-levodopa were started. · Unclear if this is a primary Parkinson's disease, related to cerebrovascular disease, or unrelated. · Continues to appear to have functional and clinical decline with worsening weakness and worsening hallucinations.     Dementia with behavioral disturbance  · Patient reported to have vascular dementia per hospice chart however patient and family deny significant cognitive impairment or formal diagnosis in the past.  · Previous PCP records indicate Alzheimer's dementia. · Patient previously had mini-cog score 1/5 with abnormal clock draw test.  · Patient does appear to have cognitive impairment clinically and has intermittent hallucinations which could be a behavioral disturbance. · Unclear subtype; patient may have vascular dementia or Alzheimer's dementia and given possible parkinsonism, could have a Parkinson's disease dementia or Lewy body dementia. · Hallucinations are most likely secondary to progressing dementia.   Patient may have an underlying anxiety component given the fearfulness associated with these and ongoing anxiousness. Trial increase in duloxetine as below. · Continue supportive care in the home and monitor cognition. Essential hypertension  · Per history currently on amlodipine, HCTZ, and labetalol. · Blood pressure significantly hypotensive today. · Given significant hypotension and concern for risk of dehydration if her oral intake begins to drop, discontinue hydrochlorothiazide today and monitor on amlodipine and labetalol only. Anxiety  · Patient with generalized anxiety currently on duloxetine 30 mg daily but hopes that this will also assist with her chronic pain. · Patient has had increased anxiousness and hallucinations which may be related to uncontrolled anxiety in the setting of dementia. · Discussed trial of increased's duloxetine with patient and  who are in agreement to try this. · Increase duloxetine to 60 mg daily. Neuropathy  · Patient endorses a burning pain in her bilateral feet consistent with neuropathy. · Possibly related to spondylosis. · Previously attempted to increase gabapentin to 200 mg nightly but this was stopped due to perceived side effects. Continue 100 mg nightly. · Increased duloxetine dose may assist with neuropathy    Severe protein calorie malnutrition  · Patient sarcopenic and cachectic on exam with poor oral intake. · Secondary to patient's advanced multimorbidity including parkinsonism and dementia. · Contributing to physiologic reserve and overall prognosis. · Continue supportive care and encourage oral intake.     Hyperlipidemia  · Reported per history but not currently on an anti-lipid medication. · Last lipid panel in 3/2017 with HDL 77 and LDL 94.  · Given concern for limited prognosis, patient unlikely to realize benefit from initiation of a statin.     Chronic kidney disease, stage 3b  · Per history likely secondary to longstanding hypertension.   · Most recent chemistry in 11/2020 with BUN 40 and creatinine 1.2. · Avoid nephrotoxic medications.     Lumbosacral spondylosis  · Per history and reportedly previously with chronic pain. · Previous trial of morphine with hospice however did not tolerate. · Continue to monitor and offer over-the-counter analgesics and consideration of another trial of opioids if patient and family willing. Constipation  · Patient with significant constipation requiring enema and bowel regimen including senna and milk of magnesia. · Discussed that patient can take up to 6 senna tablets in a day and recommended continued use of 2 tablets twice daily for now with possible increase if there is no response. · Refill for senna-docusate 2 tablets twice daily sent to pharmacy. Debility  · Patient currently bed and wheelchair-bound and requires significant assistance to transfer secondary to falls with hip fracture, interrupted rehabilitation course, complicated by advanced age, deconditioning, and potential neurologic disorders. · Patient did have a trial of home PT but was discharged due to lack of response. · Anticipate patient has poor rehabilitation potential and will continue to decline.     Hospice patient  · Patient enrolled in hospice with [facility] for a terminal diagnosis of cerebrovascular disease with vascular dementia. · Patient did not have significant improvement with a home PT course. · Appears to be having physical and cognitive decline. · Continue hospice supports. Follow Up:  Return in about 8 weeks (around 9/23/2021), or if symptoms worsen or fail to improve. Subjective:     History of Present Illness:   Stiven Galdamez is a 81 y.o. female with significant past medical history of parkinsonism, cerebrovascular disease on imaging, dysphagia, CKD 3, hypertension, hyperlipidemia, anxiety, fall with multiple fractures and secondary debility who was evaluated in her home for management of multiple medical issues. The patient's main complaint at this time is of constipation. Her last bowel movement was over a week ago. She is taking 2 senna in the morning and 2 in the evening which was recently upped a few weeks ago. About 2-1/2 weeks ago she had to have an enema because she was so impacted. She is also taking milk of magnesia and drinking prune juice. She denies any associated abdominal pain or nausea. The patient also complains of back pain. She is taking acetaminophen 3 times daily but does not feel it is very effective. She does not have lidocaine. She does use Aspercreme along with a heating pad. She reports that pain is along her entire spine. Previously she had been prescribed morphine but this was stopped due to concerns that she was having side effects. Her 's main concern right now is of worsening confusion and hallucinations. Her  states that she was on a medication that he thinks caused it but they have since taken her off of it. This morning, she thought she was out of the bed and on the floor. She has lorazepam and quetiapine in the home but they do not use them. Her  also notes that her anxiety has been increasing and that she gets upset easily. She worries throughout the day about everything. She will ask about things repeatedly such as if she has enough grounds or whether monitor is done. Her  thinks that this is because she cannot perform these tasks anymore and it is bothering her. She does get anxious and fearful during her hallucinations. Discussed with one of the patient's private duty caregivers. This caregiver has been with the patient for about 2 weeks. There has been some conflict between one of the patient's private duty nurses and the hospice nurses regarding her medications, as a private duty nurse will change medication regimens and remove things from the pillbox without running it by primary care or hospice.   The caregiver present today has been pushing back against this and trying to follow the prescribed orders. During the visit, the patient's  from hospice arrived. Her  also notes that there has been conflict between what is prescribed and what is being given. Her  has been trying to provide education about the use of quetiapine and lorazepam for the hallucinations of family has been resistant to this. Discussed medication changes with the  who plans to adjust the pillbox.       Functional Status (I: Independent, A: Assisted, D: Dependent)  ADLs I A D Notes   Bathing []  [x]  []     Dressing []  []  [x]     Toileting []  []  [x]     Transfers []  []  [x]     Feeding []  [x]  []     Ambulation []  []  [x]  Assistive devices: wheelchair   IADLs I A D    Telephone [x]  []  []     Transportation []  []  [x]  Driving safety concerns: NA patient does not drive    Shopping []  []  [x]     Meal prep []  []  [x]     Housework []  []  [x]     Medications []  []  [x]     Finances []  []  [x]           Past Medical History:   Diagnosis Date    Anxiety     Arthritis     Back pain     CKD (chronic kidney disease)     stage 3    Closed fracture of left clavicle 10/26/2020    Closed fracture of surgical neck of left humerus 10/26/2020    Closed intertrochanteric fracture of hip, left, initial encounter ([facility] [address] 10/26/2020    Depression     Fracture of left humerus     Hx of falling     Hyperlipidemia     Hypertension     Non-traumatic compression fracture of eleventh thoracic vertebra ([facility] [address] 12/28/2018    Osteoporosis     Weakness        Past Surgical History:   Procedure Laterality Date    ARM SURGERY Left 8/4/2020    PARTIAL OSTECTOMY LEFT HUMERUS (CPT 38313) performed by Clementina Nance DO at [address] [place] [address], DIAGNOSTIC      EYE SURGERY      carmelo lense implants    HIP FRACTURE SURGERY Left 10/26/2020    HIP OPEN REDUCTION INTERNAL FIXATION performed [facility] Luna [facility] DO [facility]      NERVE BLOCK Left 03/28/2017    leftlumbar transforaminal NB #1     NERVE BLOCK Left 04/04/2017    Left lumbar transforaminal nerve block #2 L4-5, L5-S1    NERVE BLOCK Left 06/13/2017    transforaminal nerve block, lumbar #3    NERVE BLOCK Left 08/15/2017    paravertebral facet left lumbar #1    OTHER SURGICAL HISTORY  02/10/2017    CT guided lumbar spine bone biopsy    PARATHYROID GLAND SURGERY      NC LUMBAR SPINE FUSN,POST INTRBDY N/A 9/13/2018    L3-S1 FUSION, L4-L5, L5-S1  TRANSFORAMINAL LUMBAR INTERBODY FUSION -- NEEDS CAGES, PLATES, SCREWS, O-ARM, AUDIOLOGY, CHIARA TABLE, CELL SAVER, PLATELET GEL - [employer] [place] Us [place] [facility] [place]       Family History   Problem Relation Age of Onset    Diabetes Mother     High Blood Pressure Mother     Parkinsonism Father     Lung Cancer Brother     Heart Disease Brother        Social History     Socioeconomic History    Marital status:      Spouse name: Not on file    Number of children: Not on file    Years of education: Not on file    Highest education level: Not on file   Occupational History    Not on file   Tobacco Use    Smoking status: Never Smoker    Smokeless tobacco: Never Used   Vaping Use    Vaping Use: Never used   Substance and Sexual Activity    Alcohol use: No    Drug use: No    Sexual activity: Never   Other Topics Concern    Not on file   Social History Narrative    Not on file     Social Determinants of Health     Financial Resource Strain:     Difficulty of Paying Living Expenses:    Food Insecurity:     Worried About Running Out of Food in the Last Year:     Ran Out of Food in the Last Year:    Transportation Needs:     Lack of Transportation (Medical):      Lack of Transportation (Non-Medical):    Physical Activity:     Days of Exercise per Week:     Minutes of Exercise per Session:    Stress:     Feeling of Stress :    Social Connections:     Frequency of Communication with Friends and Family:     Frequency of Social Gatherings with Friends and Family:     Attends Jehov[employer]'s witness Services:     Active Member of Clubs or Organizations:     Attends Club or Organization Meetings:     Marital Status:    Intimate Partner Violence:     Fear of Current or Ex-Partner:     Emotionally Abused:     Physically Abused:     Sexually Abused:        Current Medications:  Medications reviewed: yes    Current Outpatient Medications on File Prior to Visit   Medication Sig Dispense Refill    gabapentin (NEURONTIN) 100 MG capsule Take 100 mg by mouth every evening.  QUEtiapine (SEROQUEL) 25 MG tablet Take 25 mg by mouth every 6 hours as needed (Patient not taking: Reported on 6/3/2021)      labetalol (NORMODYNE) 100 MG tablet Take 0.5 tablets by mouth 2 times daily 30 tablet 5    acetaminophen (TYLENOL) 325 MG tablet Take 650 mg by mouth every 6 hours as needed for Pain (for mild pain)      aspirin 325 MG EC tablet Take 1 tablet by mouth 2 times daily for 28 days 56 tablet 0    DULoxetine (CYMBALTA) 30 MG extended release capsule Take 30 mg by mouth daily      bisacodyl (DULCOLAX) 10 MG suppository Place 10 mg rectally daily as needed for Constipation      amLODIPine (NORVASC) 5 MG tablet Take 5 mg by mouth daily      B Complex-C-E-Zn (STRESS PLUS ZINC) TABS Take 1 tablet by mouth daily      magnesium hydroxide (MILK OF MAGNESIA) 400 MG/5ML suspension Take by mouth daily as needed for Constipation      hydrochlorothiazide (HYDRODIURIL) 12.5 MG tablet Take 1 tablet by mouth daily 30 tablet 3     No current facility-administered medications on file prior to visit. Review of Systems   Constitutional: Negative for appetite change. Respiratory: Positive for shortness of breath (at times). Gastrointestinal: Positive for constipation. Negative for abdominal pain and nausea.    Genitourinary: Negative for dysuria and frequency. Musculoskeletal: Positive for arthralgias and back pain. Neurological: Positive for light-headedness (with positional change) and numbness (feet). Psychiatric/Behavioral: Positive for confusion and hallucinations. The patient is nervous/anxious. Objective:     Physical Exam  BP (!) 79/51   Pulse 77   Temp 97.3 °F (36.3 °C)   SpO2 93%     Physical Exam  Vitals reviewed. Constitutional:       General: She is not in acute distress. Appearance: She is well-developed. Comments: Thin appearing female lying in bed, in no distress, sarcopenic and frail. HENT:      Head: Normocephalic and atraumatic. Mouth/Throat:      Mouth: Mucous membranes are moist.   Eyes:      Pupils: Pupils are equal, round, and reactive to light. Neck:      Thyroid: No thyromegaly. Cardiovascular:      Rate and Rhythm: Normal rate and regular rhythm. Pulses: Decreased pulses. Heart sounds: Normal heart sounds. Pulmonary:      Effort: Pulmonary effort is normal. No respiratory distress. Breath sounds: Decreased breath sounds present. Abdominal:      General: Bowel sounds are normal. There is distension (full). Palpations: Abdomen is soft. Tenderness: There is no abdominal tenderness. There is no guarding. Musculoskeletal:      Right lower leg: No edema. Left lower leg: No edema. Lymphadenopathy:      Cervical: No cervical adenopathy. Skin:     General: Skin is warm and dry. Coloration: Skin is pale. Neurological:      Mental Status: She is alert. Mental status is at baseline. Comments: Mask-like face with restricted affect. Psychiatric:         Attention and Perception: Attention normal. She does not perceive auditory or visual hallucinations. Mood and Affect: Affect is flat. Behavior: Behavior is slowed. Behavior is cooperative. Cognition and Memory: Cognition is impaired. Memory is impaired.          Recent Labs:    Lab Results   Component Value Date    WBC 11.5 11/02/2020    HGB 8.1 (L) 11/02/2020    HCT 25.5 (L) 11/02/2020    MCV 97.7 11/02/2020     11/02/2020    LYMPHOPCT 18.0 (L) 11/02/2020    RBC 2.61 (L) 11/02/2020    MCH 31.0 11/02/2020    MCHC 31.8 (L) 11/02/2020    RDW 16.6 (H) 11/02/2020       Lab Results   Component Value Date     11/02/2020    K 3.7 11/02/2020     11/02/2020    CO2 21 (L) 11/02/2020    BUN 40 (H) 11/02/2020    CREATININE 1.2 (H) 11/02/2020    GLUCOSE 87 11/02/2020    CALCIUM 8.1 (L) 11/02/2020    PROT 5.3 (L) 11/02/2020    LABALBU 2.6 (L) 11/02/2020    BILITOT 1.1 11/02/2020    ALKPHOS 457 (H) 11/02/2020    AST 56 (H) 11/02/2020    ALT 34 (H) 11/02/2020    LABGLOM 43 11/02/2020    GFRAA 52 11/02/2020       Lab Results   Component Value Date    VITD25 61 09/26/2011    TSH 1.080 10/27/2020       No results found for: LABA1C    Lab Results   Component Value Date    INR 1.1 10/25/2020    INR 1.0 12/28/2018    INR 1.0 09/10/2018    PROTIME 11.9 10/25/2020    PROTIME 11.8 12/28/2018    PROTIME 11.1 09/10/2018       Juan Kaur MD  7/29/2021

## 2021-08-17 VITALS
HEART RATE: 64 BPM | TEMPERATURE: 97.5 F | SYSTOLIC BLOOD PRESSURE: 103 MMHG | DIASTOLIC BLOOD PRESSURE: 63 MMHG | OXYGEN SATURATION: 95 %

## 2021-08-17 ASSESSMENT — ENCOUNTER SYMPTOMS
BACK PAIN: 1
SHORTNESS OF BREATH: 1
COUGH: 0
ABDOMINAL PAIN: 0
CONSTIPATION: 0
NAUSEA: 0
TROUBLE SWALLOWING: 1

## 2021-10-12 LAB
BACTERIA: ABNORMAL /HPF
BILIRUBIN URINE: NEGATIVE
BLOOD, URINE: NEGATIVE
CLARITY: CLEAR
COLOR: YELLOW
GLUCOSE URINE: NEGATIVE MG/DL
KETONES, URINE: NEGATIVE MG/DL
LEUKOCYTE ESTERASE, URINE: ABNORMAL
NITRITE, URINE: NEGATIVE
PH UA: 7 (ref 5–9)
PROTEIN UA: NEGATIVE MG/DL
RBC UA: ABNORMAL /HPF (ref 0–2)
SPECIFIC GRAVITY UA: 1.01 (ref 1–1.03)
UROBILINOGEN, URINE: 0.2 E.U./DL
WBC UA: ABNORMAL /HPF (ref 0–5)

## 2021-10-14 LAB — URINE CULTURE, ROUTINE: NORMAL

## 2021-10-19 ENCOUNTER — OFFICE VISIT (OUTPATIENT)
Dept: PRIMARY CARE CLINIC | Age: 86
End: 2021-10-19
Payer: MEDICARE

## 2021-10-19 VITALS — TEMPERATURE: 97.2 F | DIASTOLIC BLOOD PRESSURE: 46 MMHG | SYSTOLIC BLOOD PRESSURE: 70 MMHG | HEART RATE: 42 BPM

## 2021-10-19 DIAGNOSIS — E43 UNSPECIFIED SEVERE PROTEIN-CALORIE MALNUTRITION (HCC): ICD-10-CM

## 2021-10-19 DIAGNOSIS — R53.81 DEBILITY: ICD-10-CM

## 2021-10-19 DIAGNOSIS — Z51.5 HOSPICE CARE PATIENT: ICD-10-CM

## 2021-10-19 DIAGNOSIS — M47.817 SPONDYLOSIS OF LUMBOSACRAL REGION, UNSPECIFIED SPINAL OSTEOARTHRITIS COMPLICATION STATUS: ICD-10-CM

## 2021-10-19 DIAGNOSIS — F03.91 DEMENTIA WITH BEHAVIORAL DISTURBANCE, UNSPECIFIED DEMENTIA TYPE: ICD-10-CM

## 2021-10-19 DIAGNOSIS — K59.00 CONSTIPATION, UNSPECIFIED CONSTIPATION TYPE: ICD-10-CM

## 2021-10-19 DIAGNOSIS — G62.9 NEUROPATHY: ICD-10-CM

## 2021-10-19 DIAGNOSIS — I95.9 HYPOTENSION, UNSPECIFIED HYPOTENSION TYPE: ICD-10-CM

## 2021-10-19 DIAGNOSIS — G20 PARKINSONISM, UNSPECIFIED PARKINSONISM TYPE (HCC): ICD-10-CM

## 2021-10-19 DIAGNOSIS — N18.32 STAGE 3B CHRONIC KIDNEY DISEASE (HCC): ICD-10-CM

## 2021-10-19 DIAGNOSIS — I10 ESSENTIAL HYPERTENSION: ICD-10-CM

## 2021-10-19 DIAGNOSIS — E78.5 HYPERLIPIDEMIA, UNSPECIFIED HYPERLIPIDEMIA TYPE: ICD-10-CM

## 2021-10-19 DIAGNOSIS — F41.9 ANXIETY: ICD-10-CM

## 2021-10-19 DIAGNOSIS — G93.40 ACUTE ENCEPHALOPATHY: Primary | ICD-10-CM

## 2021-10-19 PROCEDURE — 1123F ACP DISCUSS/DSCN MKR DOCD: CPT | Performed by: STUDENT IN AN ORGANIZED HEALTH CARE EDUCATION/TRAINING PROGRAM

## 2021-10-19 PROCEDURE — G8484 FLU IMMUNIZE NO ADMIN: HCPCS | Performed by: STUDENT IN AN ORGANIZED HEALTH CARE EDUCATION/TRAINING PROGRAM

## 2021-10-19 PROCEDURE — 99349 HOME/RES VST EST MOD MDM 40: CPT | Performed by: STUDENT IN AN ORGANIZED HEALTH CARE EDUCATION/TRAINING PROGRAM

## 2021-10-19 PROCEDURE — 1036F TOBACCO NON-USER: CPT | Performed by: STUDENT IN AN ORGANIZED HEALTH CARE EDUCATION/TRAINING PROGRAM

## 2021-10-19 PROCEDURE — 1090F PRES/ABSN URINE INCON ASSESS: CPT | Performed by: STUDENT IN AN ORGANIZED HEALTH CARE EDUCATION/TRAINING PROGRAM

## 2021-10-19 PROCEDURE — G8417 CALC BMI ABV UP PARAM F/U: HCPCS | Performed by: STUDENT IN AN ORGANIZED HEALTH CARE EDUCATION/TRAINING PROGRAM

## 2021-10-19 PROCEDURE — 4040F PNEUMOC VAC/ADMIN/RCVD: CPT | Performed by: STUDENT IN AN ORGANIZED HEALTH CARE EDUCATION/TRAINING PROGRAM

## 2021-10-19 RX ORDER — CHOLECALCIFEROL (VITAMIN D3) 125 MCG
5 CAPSULE ORAL NIGHTLY
Qty: 30 TABLET | Refills: 11 | Status: SHIPPED | OUTPATIENT
Start: 2021-10-19

## 2021-10-19 RX ORDER — LORAZEPAM 0.5 MG/1
0.5 TABLET ORAL EVERY 6 HOURS PRN
COMMUNITY

## 2021-10-19 ASSESSMENT — ENCOUNTER SYMPTOMS
CONSTIPATION: 1
BACK PAIN: 1
ABDOMINAL PAIN: 0
SHORTNESS OF BREATH: 1
NAUSEA: 0

## 2021-10-19 NOTE — PROGRESS NOTES
[facility]      10/19/2021    Home visit medically necessary in lieu of an office visit due to:  Uses wheelchair, bedbound, needs ambulance to  get out. Chief Complaint: Ga Mcgrath is a 81 y.o. female with chief complaint of:   Chief Complaint   Patient presents with    Other     Parkinson's disease    Dementia    Hallucinations    Hypotension       Assessment & Plan      Acute encephalopathy  · Patient with inattentiveness and sleepiness concerning for encephalopathy. · Most likely related to her hypotension with possible poor perfusion. No obvious signs of pneumonia or UTI on history or exam.  · Adjustment of antihypertensives as below. Encourage fluids. Parkinsonism  · Patient with history of freezing on ambulation and signs of mild bradykinesia on exam however without resting tremor or other nonmovement related Parkinsonian symptoms. · Available previous PCP documentation notes restless leg syndrome but does not note parkinsonism. Granddaughter reports that neurology evaluated and felt her condition was consistent with Parkinson's disease. · Patient has not tried carbidopa-levodopa but has had a trial of ropinirole for RLS with hallucinations. Concern that patient would have increased hallucinations if carbidopa-levodopa were started. · Unclear if this is a primary Parkinson's disease, related to cerebrovascular disease, or unrelated. · Continues to appear to have functional and clinical decline with worsening weakness and worsening hallucinations.     Dementia with behavioral disturbance  · Patient reported to have vascular dementia per hospice chart however patient and family deny significant cognitive impairment or formal diagnosis in the past.  · Previous PCP records indicate Alzheimer's dementia.   · Patient previously had mini-cog score 1/5 with abnormal clock draw test.  · Patient does appear to have cognitive impairment clinically and has intermittent hallucinations which could be a behavioral disturbance. · Unclear subtype; patient may have vascular dementia or Alzheimer's dementia and given possible parkinsonism, could have a Parkinson's disease dementia or Lewy body dementia. · Hallucinations are most likely secondary to progressing dementia. Patient may have an underlying anxiety component given the fearfulness associated with these and ongoing anxiousness. · Discussed normal disease progression with  at bedside. · Family has been hesitant about using quetiapine. Given primarily nocturnal symptoms, trial melatonin 5 mg nightly to assist with circadian rhythm in case of a sundowning component. Essential hypertension with current hypotension  · Per history and currently on amlodipine and labetalol. · Blood pressure significantly hypotensive today with patient appearing somnolent concerning for poor perfusion. · Patient is at risk for hypotension given dysautonomia association with parkinsonism. · Discontinue all antihypertensives today. Instructed patient, family, caregiver to push fluids. Anxiety  · Patient with generalized anxiety currently on duloxetine 30 mg daily but hopes that this will also assist with her chronic pain. · Patient has had increased anxiousness and hallucinations which may be related to uncontrolled anxiety in the setting of dementia. · Did not tolerate duloxetine increased to 60 mg daily and is back to 30 mg daily. · No change to current regimen. Neuropathy  · Patient endorses a burning pain in her bilateral feet consistent with neuropathy. · Possibly related to spondylosis. · Previously attempted to increase gabapentin to 200 mg nightly but this was stopped due to perceived side effects. Continue 100 mg nightly. Severe protein calorie malnutrition  · Patient sarcopenic and cachectic on exam with poor oral intake.   · Secondary to patient's advanced multimorbidity including parkinsonism and dementia. · Contributing to physiologic reserve and overall prognosis. · Continue supportive care and encourage oral intake. · Right MUAC 23 cm.     Hyperlipidemia  · Reported per history but not currently on an anti-lipid medication. · Last lipid panel in 3/2017 with HDL 77 and LDL 94.  · Given concern for limited prognosis, patient unlikely to realize benefit from initiation of a statin.     Chronic kidney disease, stage 3b  · Per history likely secondary to longstanding hypertension. · Most recent chemistry in 11/2020 with BUN 40 and creatinine 1.2. · Avoid nephrotoxic medications.     Lumbosacral spondylosis  · Per history and reportedly previously with chronic pain. · Previous trial of morphine with hospice however did not tolerate. · Continue to monitor and offer over-the-counter analgesics and consideration of another trial of opioids if patient and family willing. Constipation  · Patient with episodes of significant constipation requiring enema and bowel regimen including senna and milk of magnesia. · Previously discussed that patient can take up to 6 senna tablets in a day and recommended continued use of 2 tablets twice daily for now with possible increase if there is no response. Debility  · Patient currently bed and wheelchair-bound and requires significant assistance to transfer secondary to falls with hip fracture, interrupted rehabilitation course, complicated by advanced age, deconditioning, and potential neurologic disorders. · Patient did have a trial of home PT but was discharged due to lack of response. · Anticipate patient has poor rehabilitation potential and will continue to decline.     Hospice patient  · Patient enrolled in hospice with [facility] for a terminal diagnosis of cerebrovascular disease with vascular dementia. · Patient did not have significant improvement with a home PT course. · Appears to be having physical and cognitive decline.   · Continue hospice supports. Follow Up:  Return in about 4 weeks (around 11/16/2021), or if symptoms worsen or fail to improve. Subjective:     History of Present Illness: Galo Ramsay is a 81 y.o. female with significant past medical history of parkinsonism, cerebrovascular disease on imaging, dysphagia, CKD 3, hypertension, hyperlipidemia, anxiety, fall with multiple fractures and secondary debility who was evaluated in her home for management of multiple medical issues. The patient's  states that she will speak and babble throughout the night which is keeping him up at night. He is unsure she is hallucinating but thinks she might be doing so. The patient does not endorse that she hallucinates or speaks at night, and states that she sleeps well. Her  is that she did just returned from a 5-day respite stay at [facility] which allowed him to sleep. The symptoms at night worsened about 2 weeks ago and seem to worsen every day. She is more confused and notes that she will think people are sitting in a chair that are not there. The patient states that she has neuropathy pain in her legs and feet but denies pain anywhere else. She has no abdominal pain. She does have diarrhea. She denies any urinary symptoms including dysuria or frequency. There has been no obvious malodor to the urine per caregivers. She has no dyspnea. She is somewhat sleepy and withdrawn today. Counseled patient's  about expected disease progression including increasing confusion and hallucinations as dementia progresses. Also discussed her risk of dysautonomia and orthostatic hypotension in the setting of her advanced Parkinson's disease. Discussed medication changes with  and private duty caregiver.       Functional Status (I: Independent, A: Assisted, D: Dependent)  ADLs I A D Notes   Bathing []  [x]  []     Dressing []  []  [x]     Toileting []  []  [x]     Transfers []  []  [x]     Feeding [] [x]  []     Ambulation []  []  [x]  Assistive devices: wheelchair   IADLs I A D    Telephone [x]  []  []     Transportation []  []  [x]  Driving safety concerns: NA patient does not drive    Shopping []  []  [x]     Meal prep []  []  [x]     Housework []  []  [x]     Medications []  []  [x]     Finances []  []  [x]           Past Medical History:   Diagnosis Date    Anxiety     Arthritis     Back pain     CKD (chronic kidney disease)     stage 3    Closed fracture of left clavicle 10/26/2020    Closed fracture of surgical neck of left humerus 10/26/2020    Closed intertrochanteric fracture of hip, left, initial encounter ([facility] [address] 10/26/2020    Depression     Fracture of left humerus     Hx of falling     Hyperlipidemia     Hypertension     Non-traumatic compression fracture of eleventh thoracic vertebra ([facility] [address] 12/28/2018    Osteoporosis     Weakness        Past Surgical History:   Procedure Laterality Date    ARM SURGERY Left 8/4/2020    PARTIAL OSTECTOMY LEFT HUMERUS (CPT 95916) performed by Rabia Salazar DO at [facility], DIAGNOSTIC      EYE SURGERY      carmelo lense implants    HIP FRACTURE SURGERY Left 10/26/2020    HIP OPEN REDUCTION INTERNAL FIXATION performed by Cintia Hidalgo DO at [facility] Left 03/28/2017    leftlumbar transforaminal NB #1     NERVE BLOCK Left 04/04/2017    Left lumbar transforaminal nerve block #2 L4-5, L5-S1    NERVE BLOCK Left 06/13/2017    transforaminal nerve block, lumbar #3    NERVE BLOCK Left 08/15/2017    paravertebral facet left lumbar #1    OTHER SURGICAL HISTORY  02/10/2017    CT guided lumbar spine bone biopsy    PARATHYROID GLAND SURGERY      [place] LUMBAR SPINE FUSN,POST INTRBDY N/A 9/13/2018    L3-S1 FUSION, L4-L5, L5-S1  TRANSFORAMINAL LUMBAR INTERBODY FUSION -- NEEDS CAGES, PLATES, SCREWS, O-ARM, AUDIOLOGY, CHIARA TABLE, CELL SAVER, PLATELET GEL - (SENOKOT-S) 8.6-50 MG tablet Take 2 tablets by mouth 2 times daily 60 tablet 3    gabapentin (NEURONTIN) 100 MG capsule Take 100 mg by mouth every evening.        QUEtiapine (SEROQUEL) 25 MG tablet Take 25 mg by mouth every 6 hours as needed (Patient not taking: Reported on 6/3/2021)      labetalol (NORMODYNE) 100 MG tablet Take 0.5 tablets by mouth 2 times daily 30 tablet 5    acetaminophen (TYLENOL) 325 MG tablet Take 650 mg by mouth every 6 hours as needed for Pain (for mild pain)      aspirin 325 MG EC tablet Take 1 tablet by mouth 2 times daily for 28 days 56 tablet 0    bisacodyl (DULCOLAX) 10 MG suppository Place 10 mg rectally daily as needed for Constipation      amLODIPine (NORVASC) 5 MG tablet Take 5 mg by mouth daily      B Complex-C-E-Zn (STRESS PLUS ZINC) TABS Take 1 tablet by mouth daily      magnesium hydroxide (MILK OF MAGNESIA) 400 MG/5ML suspension Take by mouth daily as needed for Constipation       Current Facility-Administered Medications on File Prior to Visit   Medication Dose Route Frequency Provider Last Rate Last Admin    aspirin chewable tablet 325 mg  325 mg Oral Daily Shana New MD   325 mg at 10/18/21 0956    LORazepam (ATIVAN) tablet 0.5 mg  0.5 mg Oral Q6H PRN Shana New MD   0.5 mg at 10/18/21 1822    DULoxetine (CYMBALTA) extended release capsule 30 mg  30 mg Oral Daily Shana New MD   30 mg at 10/17/21 2230    bisacodyl (DULCOLAX) suppository 10 mg  10 mg Rectal Daily PRN Shana New MD        gabapentin (NEURONTIN) capsule 100 mg  100 mg Oral Nightly Shana New MD   100 mg at 10/17/21 2230    labetalol (NORMODYNE) tablet 50 mg  50 mg Oral 2 times per day Shana New MD   50 mg at 10/18/21 0956    magnesium hydroxide (MILK OF MAGNESIA) 400 MG/5ML suspension 30 mL  30 mL Oral Daily PRN Shana New MD        amLODIPine (NORVASC) tablet 5 mg  5 mg Oral Daily Shana New MD   5 mg at 10/18/21 6003    senna (SENOKOT) tablet 17.2 mg  2 tablet Oral BID Mica Root MD   17.2 mg at 10/18/21 0957    senna (SENOKOT) tablet 8.6 mg  1 tablet Oral Daily Mica Root MD   8.6 mg at 10/17/21 1610    QUEtiapine (SEROQUEL) tablet 25 mg  25 mg Oral Nightly Mica Root MD   25 mg at 10/17/21 2231    QUEtiapine (SEROQUEL) tablet 25 mg  25 mg Oral Q6H PRN Mica Root MD           Review of Systems   Constitutional: Negative for appetite change. Respiratory: Positive for shortness of breath (at times). Gastrointestinal: Positive for constipation. Negative for abdominal pain and nausea. Genitourinary: Negative for dysuria and frequency. Musculoskeletal: Positive for arthralgias and back pain. Neurological: Positive for light-headedness (with positional change) and numbness (feet). Psychiatric/Behavioral: Positive for confusion and hallucinations. The patient is nervous/anxious. Objective:     Physical Exam  BP (!) 70/46   Pulse (!) 42   Temp 97.2 °F (36.2 °C)     Physical Exam  Vitals reviewed. Constitutional:       General: She is not in acute distress. Appearance: She is well-developed. Comments: Thin appearing female in wheelchair, in no distress, sarcopenic and frail, appears fatigued, sleepy. HENT:      Head: Normocephalic and atraumatic. Neck:      Thyroid: No thyromegaly. Cardiovascular:      Rate and Rhythm: Normal rate and regular rhythm. Pulses: Decreased pulses. Heart sounds: Normal heart sounds. Pulmonary:      Effort: Pulmonary effort is normal. No respiratory distress. Breath sounds: Examination of the right-lower field reveals rales. Examination of the left-lower field reveals rales. Decreased breath sounds and rales present. Abdominal:      General: Bowel sounds are normal. There is no distension. Palpations: Abdomen is soft. Tenderness: There is no abdominal tenderness.  There is no right CVA tenderness, left CVA tenderness or guarding. Musculoskeletal:      Right lower leg: No edema. Left lower leg: No edema. Lymphadenopathy:      Cervical: No cervical adenopathy. Skin:     General: Skin is warm and dry. Coloration: Skin is pale. Neurological:      Mental Status: She is alert. Mental status is at baseline. Comments: Mask-like face with restricted affect. Psychiatric:         Attention and Perception: She is inattentive. She does not perceive auditory or visual hallucinations. Mood and Affect: Affect is flat. Behavior: Behavior is slowed. Behavior is cooperative. Cognition and Memory: Cognition is impaired. Memory is impaired.       Comments: No obvious active hallucinations on exam.         Recent Labs:    Lab Results   Component Value Date    WBC 11.5 11/02/2020    HGB 8.1 (L) 11/02/2020    HCT 25.5 (L) 11/02/2020    MCV 97.7 11/02/2020     11/02/2020    LYMPHOPCT 18.0 (L) 11/02/2020    RBC 2.61 (L) 11/02/2020    MCH 31.0 11/02/2020    MCHC 31.8 (L) 11/02/2020    RDW 16.6 (H) 11/02/2020       Lab Results   Component Value Date     11/02/2020    K 3.7 11/02/2020     11/02/2020    CO2 21 (L) 11/02/2020    BUN 40 (H) 11/02/2020    CREATININE 1.2 (H) 11/02/2020    GLUCOSE 87 11/02/2020    CALCIUM 8.1 (L) 11/02/2020    PROT 5.3 (L) 11/02/2020    LABALBU 2.6 (L) 11/02/2020    BILITOT 1.1 11/02/2020    ALKPHOS 457 (H) 11/02/2020    AST 56 (H) 11/02/2020    ALT 34 (H) 11/02/2020    LABGLOM 43 11/02/2020    GFRAA 52 11/02/2020       Lab Results   Component Value Date    VITD25 61 09/26/2011    TSH 1.080 10/27/2020       No results found for: LABA1C    Lab Results   Component Value Date    INR 1.1 10/25/2020    INR 1.0 12/28/2018    INR 1.0 09/10/2018    PROTIME 11.9 10/25/2020    PROTIME 11.8 12/28/2018    PROTIME 11.1 09/10/2018       Rojelio Gastelum MD  10/19/2021

## 2021-11-18 ENCOUNTER — OFFICE VISIT (OUTPATIENT)
Dept: PRIMARY CARE CLINIC | Age: 86
End: 2021-11-18
Payer: MEDICARE

## 2021-11-18 VITALS
TEMPERATURE: 97.3 F | OXYGEN SATURATION: 99 % | DIASTOLIC BLOOD PRESSURE: 61 MMHG | HEART RATE: 66 BPM | SYSTOLIC BLOOD PRESSURE: 92 MMHG

## 2021-11-18 DIAGNOSIS — G20 PARKINSONISM, UNSPECIFIED PARKINSONISM TYPE (HCC): Primary | ICD-10-CM

## 2021-11-18 DIAGNOSIS — F03.91 DEMENTIA WITH BEHAVIORAL DISTURBANCE, UNSPECIFIED DEMENTIA TYPE: ICD-10-CM

## 2021-11-18 DIAGNOSIS — R53.81 DEBILITY: ICD-10-CM

## 2021-11-18 DIAGNOSIS — M47.817 SPONDYLOSIS OF LUMBOSACRAL REGION, UNSPECIFIED SPINAL OSTEOARTHRITIS COMPLICATION STATUS: ICD-10-CM

## 2021-11-18 DIAGNOSIS — F41.9 ANXIETY: ICD-10-CM

## 2021-11-18 DIAGNOSIS — N18.32 STAGE 3B CHRONIC KIDNEY DISEASE (HCC): ICD-10-CM

## 2021-11-18 DIAGNOSIS — Z51.5 HOSPICE CARE PATIENT: ICD-10-CM

## 2021-11-18 DIAGNOSIS — I10 ESSENTIAL HYPERTENSION: ICD-10-CM

## 2021-11-18 DIAGNOSIS — E78.5 HYPERLIPIDEMIA, UNSPECIFIED HYPERLIPIDEMIA TYPE: ICD-10-CM

## 2021-11-18 DIAGNOSIS — G62.9 NEUROPATHY: ICD-10-CM

## 2021-11-18 DIAGNOSIS — K59.00 CONSTIPATION, UNSPECIFIED CONSTIPATION TYPE: ICD-10-CM

## 2021-11-18 DIAGNOSIS — E43 UNSPECIFIED SEVERE PROTEIN-CALORIE MALNUTRITION (HCC): ICD-10-CM

## 2021-11-18 PROCEDURE — 1123F ACP DISCUSS/DSCN MKR DOCD: CPT | Performed by: STUDENT IN AN ORGANIZED HEALTH CARE EDUCATION/TRAINING PROGRAM

## 2021-11-18 PROCEDURE — 1090F PRES/ABSN URINE INCON ASSESS: CPT | Performed by: STUDENT IN AN ORGANIZED HEALTH CARE EDUCATION/TRAINING PROGRAM

## 2021-11-18 PROCEDURE — 1036F TOBACCO NON-USER: CPT | Performed by: STUDENT IN AN ORGANIZED HEALTH CARE EDUCATION/TRAINING PROGRAM

## 2021-11-18 PROCEDURE — 4040F PNEUMOC VAC/ADMIN/RCVD: CPT | Performed by: STUDENT IN AN ORGANIZED HEALTH CARE EDUCATION/TRAINING PROGRAM

## 2021-11-18 PROCEDURE — 99349 HOME/RES VST EST MOD MDM 40: CPT | Performed by: STUDENT IN AN ORGANIZED HEALTH CARE EDUCATION/TRAINING PROGRAM

## 2021-11-18 PROCEDURE — G8421 BMI NOT CALCULATED: HCPCS | Performed by: STUDENT IN AN ORGANIZED HEALTH CARE EDUCATION/TRAINING PROGRAM

## 2021-11-18 PROCEDURE — G8484 FLU IMMUNIZE NO ADMIN: HCPCS | Performed by: STUDENT IN AN ORGANIZED HEALTH CARE EDUCATION/TRAINING PROGRAM

## 2021-11-18 RX ORDER — AMLODIPINE BESYLATE 10 MG/1
10 TABLET ORAL DAILY
COMMUNITY

## 2021-11-18 RX ORDER — QUETIAPINE FUMARATE 25 MG/1
TABLET, FILM COATED ORAL
Qty: 90 TABLET | Refills: 5 | Status: SHIPPED | OUTPATIENT
Start: 2021-11-18

## 2021-11-18 NOTE — PROGRESS NOTES
[facility]      11/18/2021    Home visit medically necessary in lieu of an office visit due to:  Uses wheelchair, bedbound, needs ambulance to  get out. Chief Complaint: Adam Grider is a 81 y.o. female with chief complaint of:   Chief Complaint   Patient presents with    Hypertension    Hallucinations    Other     Parkinsonism       Assessment & Plan      Parkinsonism  · Patient with history of freezing on ambulation and signs of mild bradykinesia on exam however without resting tremor or other nonmovement related Parkinsonian symptoms. · Available previous PCP documentation notes restless leg syndrome but does not note parkinsonism. Granddaughter reports that neurology evaluated and felt her condition was consistent with Parkinson's disease. · Patient has not tried carbidopa-levodopa but has had a trial of ropinirole for RLS with hallucinations. Concern that patient would have increased hallucinations if carbidopa-levodopa were started. · Unclear if this is a primary Parkinson's disease, related to cerebrovascular disease, or unrelated. · Continues to have functional and clinical decline with worsening weakness and worsening hallucinations.     Dementia with behavioral disturbance  · Patient reported to have vascular dementia per hospice chart however patient and family deny significant cognitive impairment or formal diagnosis in the past.  · Previous PCP records indicate Alzheimer's dementia. · Patient previously had mini-cog score 1/5 with abnormal clock draw test.  · Patient does appear to have cognitive impairment clinically and has intermittent hallucinations which could be a behavioral disturbance. · Unclear subtype; patient may have vascular dementia or Alzheimer's dementia and given possible parkinsonism, could have a Parkinson's disease dementia or Lewy body dementia. · Hallucinations are most likely secondary to progressing dementia.   · Patient is taking melatonin nightly for possible sundowning aspect. · Given ongoing symptoms of hallucinations at night, and tolerance of quetiapine 25 mg, increase quetiapine to 50 mg nightly. Essential hypertension  · Blood pressure medications were previously held due to severe hypotension with amlodipine restarted and uptitrated. · Blood pressure mildly hypotensive today. · Provided hold parameters of <110/50 today. Anxiety  · Patient with generalized anxiety currently on duloxetine 30 mg daily but hopes that this will also assist with her chronic pain. · Patient has had increased anxiousness and hallucinations which may be related to uncontrolled anxiety in the setting of dementia. · Continue duloxetine 30 mg daily. Neuropathy  · Patient endorses a burning pain in her bilateral feet consistent with neuropathy. · Possibly related to spondylosis. · Previously attempted to increase gabapentin to 200 mg nightly but this was stopped due to perceived side effects. Continue 100 mg nightly. Severe protein calorie malnutrition  · Patient sarcopenic and cachectic on exam with poor oral intake. · Secondary to patient's advanced multimorbidity including parkinsonism and dementia. · Contributing to physiologic reserve and overall prognosis. · Continue supportive care and encourage oral intake. · Right MUAC 23 cm today on my evaluation, similar to previous visit.     Hyperlipidemia  · Reported per history but not currently on an anti-lipid medication. · Last lipid panel in 3/2017 with HDL 77 and LDL 94.  · Given concern for limited prognosis, patient unlikely to realize benefit from initiation of a statin.     Chronic kidney disease, stage 3b  · Per history likely secondary to longstanding hypertension. · Most recent chemistry in 11/2020 with BUN 40 and creatinine 1.2. · Avoid nephrotoxic medications.     Lumbosacral spondylosis  · Per history and reportedly previously with chronic pain.   · Previous trial of morphine with hospice however did not tolerate. · Continue to monitor and offer over-the-counter analgesics and consideration of another trial of opioids if patient and family willing. Constipation  · Patient with episodes of significant constipation requiring enema and bowel regimen including senna and milk of magnesia. · Discussed that patient can take up to 6 senna tablets in a day and recommended continued use of 2 tablets twice daily for now with possible increase if there is no response. Debility  · Patient currently bed and wheelchair-bound and requires significant assistance to transfer secondary to falls with hip fracture, interrupted rehabilitation course, complicated by advanced age, deconditioning, and potential neurologic disorders. · Patient did have a trial of home PT but was discharged due to lack of response. · Anticipate patient has poor rehabilitation potential and will continue to decline.     Hospice patient  · Patient enrolled in hospice with [facility] for a terminal diagnosis of cerebrovascular disease with dementia. · Patient did not have significant improvement with a home PT course. · Continues to physical and cognitive decline. · Continue hospice supports. Certification of terminal illness: Otf Mcneil is a 81 y.o. female with terminal diagnosis of cerebrovascular disease. Comorbid conditions include dementia, parkinsonism, dysphagia, CKD, hypertension. Her PPS is 30%. Since her last recertification, the patient has had signs of decline. She is increasingly anxious and confused and has had increasing hallucinations and behaviors at night requiring multiple medication adjustments. She has diminishing appetite. She is increasingly fatigued and spending more time in bed. She requires total care for all ADLs and at times has needed increasing assistance with feeding. She is nonambulatory and has incontinence of bowel and bladder.   Given her declining function, increasing fatigue, and increasing confusion and hallucinations, the patient continues to show signs of decline and remains appropriate for hospice services. The patient has an expected prognosis of 6 months or less if her disease runs its natural course. Follow Up:  Return in about 6 weeks (around 12/30/2021), or if symptoms worsen or fail to improve. Subjective:     History of Present Illness: Delmar Eli is a 81 y.o. female with significant past medical history of parkinsonism, cerebrovascular disease on imaging, dysphagia, CKD 3, hypertension, hyperlipidemia, anxiety, fall with multiple fractures and secondary debility who was evaluated in her home for management of multiple medical issues. Since her last visit, the patient has had episodes of hypertension after her antihypertensives were discontinued which have been slowly restarted. Her caregiver reports that the blood pressures go up and down; for example yesterday the blood pressure was 103/47 and today was 163/69. She is usually higher in the evening. On review of her logs, her blood pressures ranged from 167/74 to 90/53. She continues to talk throughout the night and keep her  up which is causing him significant stress and frustration. They tried to the quetiapine the last few nights but he feels it was not necessarily helping. It is not obviously overly sedating her, but she is groggy in the morning. She continues to have hallucinations, including apparent visual hallucinations where she is speaking to someone. They have occasionally use lorazepam but they cannot say how often. They are giving her melatonin. The patient continues to be unaware that she is up talking at night. The patient reports that she is doing well. She denies any pain. She denies any dyspnea or cough. She endorses constipation at times. She denies dysuria or frequency. She also has lightheadedness when getting out of bed.   She feels her appetite is good and that she is eating well. Her caregiver reports that her skin is intact. Her  asked about when the next respite stay could be. Encouraged him to discuss with his hospice social worker.       Functional Status (I: Independent, A: Assisted, D: Dependent)  ADLs I A D Notes   Bathing []  [x]  []     Dressing []  []  [x]     Toileting []  []  [x]     Transfers []  []  [x]     Feeding []  [x]  []     Ambulation []  []  [x]  Assistive devices: wheelchair   IADLs I A D    Telephone [x]  []  []     Transportation []  []  [x]  Driving safety concerns: NA patient does not drive    Shopping []  []  [x]     Meal prep []  []  [x]     Housework []  []  [x]     Medications []  []  [x]     Finances []  []  [x]           Past Medical History:   Diagnosis Date    Anxiety     Arthritis     Back pain     CKD (chronic kidney disease)     stage 3    Closed fracture of left clavicle 10/26/2020    Closed fracture of surgical neck of left humerus 10/26/2020    Closed intertrochanteric fracture of hip, left, initial encounter ([facility] [address] 10/26/2020    Depression     Fracture of left humerus     Hx of falling     Hyperlipidemia     Hypertension     Non-traumatic compression fracture of eleventh thoracic vertebra ([facility] [address] 12/28/2018    Osteoporosis     Weakness        Past Surgical History:   Procedure Laterality Date    ARM SURGERY Left 8/4/2020    PARTIAL OSTECTOMY LEFT HUMERUS (CPT 19217) performed by Siddharth Felder DO at [address] [facility], [place], DIAGNOSTIC      EYE SURGERY      carmelo lense implants    HIP FRACTURE SURGERY Left 10/26/2020    HIP OPEN REDUCTION INTERNAL FIXATION performed by Jasper Hernandez DO at [facility] Left 03/28/2017    leftlumbar transforaminal NB #1     NERVE BLOCK Left 04/04/2017    Left lumbar transforaminal nerve block #2 L4-5, L5-S1    NERVE BLOCK Left 06/13/2017 transforaminal nerve block, lumbar #3    NERVE BLOCK Left 08/15/2017    paravertebral facet left lumbar #1    OTHER SURGICAL HISTORY  02/10/2017    CT guided lumbar spine bone biopsy    PARATHYROID GLAND SURGERY      ID LUMBAR SPINE FUSN,POST INTRBDY N/A 9/13/2018    L3-S1 FUSION, L4-L5, L5-S1  TRANSFORAMINAL LUMBAR INTERBODY FUSION -- NEEDS CAGES, PLATES, SCREWS, O-ARM, AUDIOLOGY, CHIARA TABLE, CELL SAVER, PLATELET GEL - [employer] performed by Nathanael Crigler, MD at [facility]       Family History   Problem Relation Age of Onset    Diabetes Mother     High Blood Pressure Mother     Parkinsonism Father     Lung Cancer Brother     Heart Disease Brother        Social History     Socioeconomic History    Marital status:      Spouse name: Not on file    Number of children: Not on file    Years of education: Not on file    Highest education level: Not on file   Occupational History    Not on file   Tobacco Use    Smoking status: Never Smoker    Smokeless tobacco: Never Used   Vaping Use    Vaping Use: Never used   Substance and Sexual Activity    Alcohol use: No    Drug use: No    Sexual activity: Never   Other Topics Concern    Not on file   Social History Narrative    Not on file     Social Determinants of Health     Financial Resource Strain:     Difficulty of Paying Living Expenses: Not on file   Food Insecurity:     Worried About Running Out of Food in the Last Year: Not on file    Jennifer of Food in the Last Year: Not on file   Transportation Needs:     Lack of Transportation (Medical): Not on file    Lack of Transportation (Non-Medical):  Not on file   Physical Activity:     Days of Exercise per Week: Not on file    Minutes of Exercise per Session: Not on file   Stress:     Feeling of Stress : Not on file   Social Connections:     Frequency of Communication with Friends and Family: Not on file    Frequency of Social Gatherings with Friends and Family: Not on file    Attends [employer] Services: Not on file    Active Member of Clubs or Organizations: Not on file    Attends Club or Organization Meetings: Not on file    Marital Status: Not on file   Intimate Partner Violence:     Fear of Current or Ex-Partner: Not on file    Emotionally Abused: Not on file    Physically Abused: Not on file    Sexually Abused: Not on file   Housing Stability:     Unable to Pay for Housing in the Last Year: Not on file    Number of [place] in the Last Year: Not on file    Unstable Housing in the Last Year: Not on file       Current Medications:  Medications reviewed: yes    Current Outpatient Medications on File Prior to Visit   Medication Sig Dispense Refill    LORazepam (ATIVAN) 0.5 MG tablet Take 0.5 mg by mouth every 6 hours as needed for Anxiety. (Patient not taking: Reported on 10/19/2021)      melatonin 5 MG TABS tablet Take 1 tablet by mouth nightly 30 tablet 11    DULoxetine (CYMBALTA) 30 MG extended release capsule Take 2 capsules by mouth daily (Patient taking differently: Take 30 mg by mouth daily ) 60 capsule 3    sennosides-docusate sodium (SENOKOT-S) 8.6-50 MG tablet Take 2 tablets by mouth 2 times daily 60 tablet 3    gabapentin (NEURONTIN) 100 MG capsule Take 100 mg by mouth every evening.  QUEtiapine (SEROQUEL) 25 MG tablet Take 25 mg by mouth every 6 hours as needed And QHS      acetaminophen (TYLENOL) 325 MG tablet Take 650 mg by mouth every 6 hours as needed for Pain (for mild pain)      aspirin 325 MG EC tablet Take 1 tablet by mouth 2 times daily for 28 days 56 tablet 0    bisacodyl (DULCOLAX) 10 MG suppository Place 10 mg rectally daily as needed for Constipation      magnesium hydroxide (MILK OF MAGNESIA) 400 MG/5ML suspension Take by mouth daily as needed for Constipation       No current facility-administered medications on file prior to visit. Review of Systems   Constitutional: Positive for fatigue. Negative for appetite change.    Respiratory: impaired. Memory is impaired. Comments: No active hallucinations.          Recent Labs:    Lab Results   Component Value Date    WBC 11.5 11/02/2020    HGB 8.1 (L) 11/02/2020    HCT 25.5 (L) 11/02/2020    MCV 97.7 11/02/2020     11/02/2020    LYMPHOPCT 18.0 (L) 11/02/2020    RBC 2.61 (L) 11/02/2020    MCH 31.0 11/02/2020    MCHC 31.8 (L) 11/02/2020    RDW 16.6 (H) 11/02/2020       Lab Results   Component Value Date     11/02/2020    K 3.7 11/02/2020     11/02/2020    CO2 21 (L) 11/02/2020    BUN 40 (H) 11/02/2020    CREATININE 1.2 (H) 11/02/2020    GLUCOSE 87 11/02/2020    CALCIUM 8.1 (L) 11/02/2020    PROT 5.3 (L) 11/02/2020    LABALBU 2.6 (L) 11/02/2020    BILITOT 1.1 11/02/2020    ALKPHOS 457 (H) 11/02/2020    AST 56 (H) 11/02/2020    ALT 34 (H) 11/02/2020    LABGLOM 43 11/02/2020    GFRAA 52 11/02/2020       Lab Results   Component Value Date    VITD25 61 09/26/2011    TSH 1.080 10/27/2020       No results found for: LABA1C    Lab Results   Component Value Date    INR 1.1 10/25/2020    INR 1.0 12/28/2018    INR 1.0 09/10/2018    PROTIME 11.9 10/25/2020    PROTIME 11.8 12/28/2018    PROTIME 11.1 09/10/2018       Lawson Cook MD  11/18/2021

## 2021-12-08 ASSESSMENT — ENCOUNTER SYMPTOMS
COUGH: 0
BACK PAIN: 0
NAUSEA: 0
ABDOMINAL PAIN: 0
CONSTIPATION: 1
SHORTNESS OF BREATH: 0

## 2024-04-25 NOTE — PROGRESS NOTES
[facility] at [facility]      2/11/2021    Home visit medically necessary in lieu of an office visit due to:  Uses wheelchair, bedbound, needs ambulance to  get out. Chief Complaint: Izzy Klein is a 81 y.o. female with chief complaint of:   Chief Complaint   Patient presents with    Extremity Weakness    Establish Care       Assessment & Plan      Parkinsonism  · Patient with history of freezing on ambulation and signs of mild bradykinesia on exam however without resting tremor or other nonmovement related Parkinsonian symptoms. Reportedly with concern from PCP and neurology previously for Parkinson's disease. · Patient has not tried carbidopa-levodopa but has had a trial of ropinirole for RLS with hallucinations. · Attempt to obtain more information about previous work-ups to determine if this is a primary Parkinson's disease, related to cerebrovascular disease, or unrelated. · If parkinsonism is truly present, would complicate patient's rehabilitation course. · Could consider whether a trial of carbidopa-levodopa is indicated or desired. Cognitive impairment  · Patient reported to have vascular dementia per hospice chart however patient and family deny significant cognitive impairment or formal diagnosis in the past.  · Mini cog 1/5 today with abnormal clock draw; concerned that there is some amount of cognitive impairment present. · Would likely benefit from more formal cognitive testing at a later date. · Cognitive impairment would complicate rehabilitation potential.    Essential hypertension  · Per history currently on amlodipine, HCTZ, and labetalol. · Blood pressure borderline low today, but patient and family report this is not her normal.  · We will have visiting hospice nurses continue to follow and if remaining low, will consider cutting back on antihypertensives. Hyperlipidemia  · Reported per history but not currently on an anti-lipid medication.   · Last lipid panel in 3/2017 with HDL 77 and LDL 94.  · Given concern for limited prognosis, unclear if patient would realize benefit from initiation of a statin. · If prognosis improves, may be reasonable to consider initiation of a statin for secondary prevention of further ischemic cerebrovascular changes. Chronic kidney disease, stage 3b  · Per history likely secondary to longstanding hypertension. · Most recent chemistry in 11/20 with BUN 40 and creatinine 1.2. · Avoid nephrotoxic medications. Lumbosacral spondylosis  · Per history and reportedly previously with chronic pain. · Previous trial of morphine with hospice however did not tolerate. · Endorses back aches today however pain has been controlled with primarily acetaminophen. Debility  · Patient currently bed and wheelchair-bound and requires significant assistance to transfer secondary to falls with hip fracture, interrupted rehabilitation course, complicated by advanced age, deconditioning, and potential neurologic disorders. · Family reports that patient has been getting stronger doing home exercises from physical therapy. · Unclear rehabilitation potential; concern that patient would hit a plateau even with increased therapy and would likely only be able to ambulate short distances at best.  · Discussed concerns about rehabilitation potential and how it is hard to say for certain without getting a trial.  · Discussed goals of care and weighing benefits of hospice versus PT trial with patient; she requested conversation with granddaughter. · Continue current home exercise plan and will follow up with granddaughter and hospice. Hospice patient  · Patient enrolled in hospice with [facility] for a terminal diagnosis of cerebrovascular disease with vascular dementia. · We will need to obtain more information from previous providers regarding her overall neurologic diagnoses.   · As patient and family are reporting that the patient is getting stronger, will need to discuss with patient's  whether they have seen decline or improvement in whether she is still appropriate for hospice services. · Once information hospice is obtained, can discuss options regarding continued hospice versus this enrollment and more aggressive PT with granddaughter and patient. · Will discuss hospital bed concerns from  with . · At this time, it is reasonable to continue hospice services and follow-up for further goals of care conversations. Follow Up:  Return in about 4 weeks (around 3/11/2021), or for acute issues. Approximately 80 minutes was spent in the home face-to-face with the patient and family with over 50% of that time spent in counseling and education regarding hospice services, prognosis, rehabilitation potential, and in discussion of goals of care as documented in the HPI and assessment and plan. Subjective:     History of Present Illness: Kelly Concepcion is a 81 y.o. female with significant past medical history of parkinsonism, cerebrovascular disease on imaging, dysphagia, CKD 3, hypertension, hyperlipidemia, anxiety, fall with multiple fractures and secondary debility who was evaluated in her home for management of multiple medical issues and to establish care with a new [facility] physician. The patient was seen with her daughter-in-law and  present. Family reports that she had a fall and a hip fracture in October 2020 and after repair was sent to SNF for rehabilitation in [facility]. During rehabilitation, she caught COVID-19 and had a setback in her rehabilitation course. Due to the setback, she was enrolled in hospice with [facility] in November and transferred to home in December. Neither she, her , or her daughter-in-law are completely sure of the reasons for hospice, noting that it was in place when they transferred home.   The patient does note that her granddaughter handles most medical communication and decision-making. The patient was previously evaluated for potential Parkinson's disease by her PCP and had an initial neurology evaluation, but the work-up was incomplete due to her fall and fracture and rehabilitation course. Her father had Parkinson's disease and they note that a sister had multiple sclerosis. She has no known history of stroke. They report that she has not had significant tremors but did have some difficulty with initiating walking. They deny any REM sleep behaviors. She denies anosmia. She never tried any medications for Parkinson's disease, but they noted that a trial of ropinirole for RLS did cause hallucinations. Currently the patient is primarily bedbound but able to transfer with the assistance of aides to her wheelchair. She has 6 hours of private duty aides a day that assist with her function and do exercises with her. She did get a PT evaluation through hospice for restorative and safe transferring and aides have been doing the exercises with her. She and family feel that this has been helpful and that she is improving her strength. The therapy does cause fatigue. She is still not ambulating. She does require 2 person assist when she gets up to do her exercises. Her  is concerned that she is having trouble getting out of the bed due to thinning and deformity of her hospital bed mattress. She states that her memory is not \"too bad\", and her daughter-in-law states that it is \"getting better\". They do note that she is sensitive to medications and they often will cause confusion and hallucinations. There is no known diagnosis of dementia. Today she reports backaches and joint pains. She also has had some constipation. She notes some lightheadedness on occasion. Her sleep is variable, and she is receiving lorazepam at night per hospice recommendations.     She and her family are wondering about whether hospice is still the best option for her. They know that she cannot get more therapy while still on hospice and wonder if a trial of physical therapy through [facility] [facility] would be more beneficial.  Briefly discussed the patient's goals of care; she does wish to be able to walk again in order to resume a more normal life. She is pretty sure that she would not want compressions or heroic measures at end-of-life, but is unable to state whether going to the hospital is off the table. When attempting to discuss further, she requested that this be discussed with her granddaughter, as her granddaughter primarily manages these issues and the patient is unable to make a decision today.       Functional Status (I: Independent, A: Assisted, D: Dependent)  ADLs I A D Notes   Bathing []  [x]  []     Dressing []  []  [x]     Toileting []  []  [x]     Transfers []  []  [x]     Feeding []  [x]  []     Ambulation []  []  [x]  Assistive devices: wheelchair   IADLs I A D    Telephone [x]  []  []     Transportation []  []  [x]  Driving safety concerns: NA patient does not drive    Shopping []  []  [x]     Meal prep []  []  [x]     Housework []  []  [x]     Medications []  []  [x]     Finances []  []  [x]           Past Medical History:   Diagnosis Date    Anxiety     Arthritis     Back pain     CKD (chronic kidney disease)     stage 3    Closed fracture of left clavicle 10/26/2020    Closed fracture of surgical neck of left humerus 10/26/2020    Closed intertrochanteric fracture of hip, left, initial encounter ([facility] [address] 10/26/2020    Depression     Fracture of left humerus     Hx of falling     Hyperlipidemia     Hypertension     Non-traumatic compression fracture of eleventh thoracic vertebra ([address] 12/28/2018    Osteoporosis     Weakness        Past Surgical History:   Procedure Laterality Date    ARM SURGERY Left 8/4/2020    PARTIAL OSTECTOMY LEFT HUMERUS (CPT 48501) performed by Grey Homans, DO at [address] [place] [address]  ENDOSCOPY, COLON, DIAGNOSTIC      EYE SURGERY      carmelo lense implants    HIP FRACTURE SURGERY Left 10/26/2020    HIP OPEN REDUCTION INTERNAL FIXATION performed by Raffaele Francis DO at [address] [place] [address]      NERVE BLOCK Left 03/28/2017    leftlumbar transforaminal NB #1     NERVE BLOCK Left 04/04/2017    Left lumbar transforaminal nerve block #2 L4-5, L5-S1    NERVE BLOCK Left 06/13/2017    transforaminal nerve block, lumbar #3    NERVE BLOCK Left 08/15/2017    paravertebral facet left lumbar #1    OTHER SURGICAL HISTORY  02/10/2017    CT guided lumbar spine bone biopsy    PARATHYROID GLAND SURGERY      [place] LUMBAR SPINE FUSN,POST INTRBDY N/A 9/13/2018    L3-S1 FUSION, L4-L5, L5-S1  TRANSFORAMINAL LUMBAR INTERBODY FUSION -- NEEDS CAGES, PLATES, SCREWS, O-ARM, AUDIOLOGY, CHIARA TABLE, CELL SAVER, PLATELET GEL - [employer] performed [place]away [place] [facility] [place]       Family History   Problem Relation Age of Onset    Diabetes Mother     High Blood Pressure Mother     Parkinsonism Father     Lung Cancer Brother     Heart Disease Brother        Social History     Socioeconomic History    Marital status:      Spouse name: Not on file    Number of children: Not on file    Years of education: Not on file    Highest education level: Not on file   Occupational History    Not on file   Social Needs    Financial resource strain: Not on file    Food insecurity     Worry: Not on file     Inability: Not on file    Transportation needs     Medical: Not on file     Non-medical: Not on file   Tobacco Use    Smoking status: Never Smoker    Smokeless tobacco: Never Used   Substance and Sexual Activity    Alcohol use: No    Drug use: No    Sexual activity: Never   Lifestyle    Physical activity     Days per week: Not on file     Minutes per session: Not on file    Stress: Not on file   Relationships    Social connections     Talks on phone: Not on file     Gets together: Not on file     Attends [employer] service: Not on file     Active member of club or organization: Not on file     Attends meetings of clubs or organizations: Not on file     Relationship status: Not on file    Intimate partner violence     Fear of current or ex partner: Not on file     Emotionally abused: Not on file     Physically abused: Not on file     Forced sexual activity: Not on file   Other Topics Concern    Not on file   Social History Narrative    Not on file       Current Medications:  Medications reviewed: yes    Current Outpatient Medications on File Prior to Visit   Medication Sig Dispense Refill    LORazepam (ATIVAN) 0.5 MG tablet Take 0.5 mg by mouth every evening. And Q6H PRN      acetaminophen (TYLENOL) 325 MG tablet Take 650 mg by mouth every 6 hours as needed for Pain (for mild pain)      aspirin 325 MG EC tablet Take 1 tablet by mouth 2 times daily for 28 days 56 tablet 0    DULoxetine (CYMBALTA) 30 MG extended release capsule Take 30 mg by mouth daily      bisacodyl (DULCOLAX) 10 MG suppository Place 10 mg rectally daily as needed for Constipation      amLODIPine (NORVASC) 5 MG tablet Take 5 mg by mouth daily      B Complex-C-E-Zn (STRESS PLUS ZINC) TABS Take 1 tablet by mouth daily      magnesium hydroxide (MILK OF MAGNESIA) 400 MG/5ML suspension Take by mouth daily as needed for Constipation      hydrochlorothiazide (HYDRODIURIL) 12.5 MG tablet Take 1 tablet by mouth daily 30 tablet 3    labetalol (NORMODYNE) 100 MG tablet Take 100 mg by mouth 2 times daily       No current facility-administered medications on file prior to visit. Review of Systems   Constitutional: Positive for fatigue. Negative for appetite change, chills, fever and unexpected weight change. HENT: Negative for sore throat and trouble swallowing. Eyes: Negative for visual disturbance. Respiratory: Negative for cough and shortness of breath.     Cardiovascular: Negative for chest pain, GLUCOSE 87 11/02/2020    CALCIUM 8.1 (L) 11/02/2020    PROT 5.3 (L) 11/02/2020    LABALBU 2.6 (L) 11/02/2020    BILITOT 1.1 11/02/2020    ALKPHOS 457 (H) 11/02/2020    AST 56 (H) 11/02/2020    ALT 34 (H) 11/02/2020    LABGLOM 43 11/02/2020    GFRAA 52 11/02/2020       Lab Results   Component Value Date    VITD25 61 09/26/2011    TSH 1.080 10/27/2020       No results found for: LABA1C    Lab Results   Component Value Date    INR 1.1 10/25/2020    INR 1.0 12/28/2018    INR 1.0 09/10/2018    PROTIME 11.9 10/25/2020    PROTIME 11.8 12/28/2018    PROTIME 11.1 09/10/2018         Controlled Substances Monitoring: [place] reviewed 2/11/21. RX Monitoring 1/17/2018   Attestation The Prescription Monitoring Report for this patient was reviewed today.    Periodic Controlled Substance Monitoring -         Erum Brush MD  2/11/2021 no

## 2025-01-10 NOTE — PROGRESS NOTES
Per RN. Pt not alert x2 today.    Please re-order swallow eval when able to participate not motivated to quit

## (undated) DEVICE — GOWN,SIRUS,FABRNF,XL,20/CS: Brand: MEDLINE

## (undated) DEVICE — SYRINGE MED 20ML STD CLR PLAS LUERLOCK TIP N CTRL DISP

## (undated) DEVICE — EXTRAS UGOKWE

## (undated) DEVICE — PATIENT RETURN ELECTRODE, SINGLE-USE, CONTACT QUALITY MONITORING, ADULT, WITH 9FT CORD, FOR PATIENTS WEIGING OVER 33LBS. (15KG): Brand: MEGADYNE

## (undated) DEVICE — SHEET,DRAPE,40X58,STERILE: Brand: MEDLINE

## (undated) DEVICE — Device

## (undated) DEVICE — DRAPE C ARM UNIV W41XL74IN CLR PLAS XR VELC CLSR POLY STRP

## (undated) DEVICE — ROD RMR L950MM DIA2.5MM W/ EXTN BALL TIP

## (undated) DEVICE — BANDAGE,SELF ADHRNT,COFLEX,4"X5YD,STRL: Brand: COLABEL

## (undated) DEVICE — BLADE CLIPPER GEN PURP NS

## (undated) DEVICE — 3M™ IOBAN™ 2 ANTIMICROBIAL INCISE DRAPE 6650EZ: Brand: IOBAN™ 2

## (undated) DEVICE — 3M™ STERI-DRAPE™ U-DRAPE 1015: Brand: STERI-DRAPE™

## (undated) DEVICE — GLOVE ORANGE PI 8   MSG9080

## (undated) DEVICE — KIT EVAC 400CC DIA1/8IN H PAT 12.5IN 3 SPR RND SHP PVC DRN

## (undated) DEVICE — PACK,EXTREMITY,ORTHOMAX,5/CS: Brand: MEDLINE

## (undated) DEVICE — E-Z CLEAN, NON-STICK, PTFE COATED, ELECTROSURGICAL BLADE ELECTRODE, 6.5 INCH (16.5 CM): Brand: MEGADYNE

## (undated) DEVICE — CLOTH SURG PREP PREOPERATIVE CHLORHEXIDINE GLUC 2% READYPREP

## (undated) DEVICE — SYRINGE IRRIG 60ML SFT PLIABLE BLB EZ TO GRP 1 HND USE W/

## (undated) DEVICE — DRESSING GZ XRFRM 4X4(25/BX 6BX/CS)

## (undated) DEVICE — SET ORTHO STD STORTSTD1

## (undated) DEVICE — COVER,TABLE,60X90,STERILE: Brand: MEDLINE

## (undated) DEVICE — GOWN,BREATHABLE SLV,AURORA,XLG,STRL: Brand: MEDLINE

## (undated) DEVICE — TOWEL,OR,DSP,ST,BLUE,DLX,10/PK,8PK/CS: Brand: MEDLINE

## (undated) DEVICE — PENCIL ES L3M BTTN SWCH HOLSTER W/ BLDE ELECTRD EDGE

## (undated) DEVICE — BANDAGE,ELASTIC,ESMARK,STERILE,4"X9',LF: Brand: MEDLINE

## (undated) DEVICE — MARKER,SKIN,WI/RULER AND LABELS: Brand: MEDLINE

## (undated) DEVICE — SURGICAL PROCEDURE PACK BASIC

## (undated) DEVICE — GOWN,SIRUS,POLYRNF,BRTHSLV,XLN/XL,20/CS: Brand: MEDLINE

## (undated) DEVICE — KIT PLT RATIO DISPNS KT 2IN CANN TIP SPRY TIP DISP MAGELLAN

## (undated) DEVICE — DRAPE 64X41IN RADIOLOGY C ARM EQUIP STER

## (undated) DEVICE — SOLUTION IV IRRIG POUR BRL 0.9% SODIUM CHL 2F7124

## (undated) DEVICE — BRUNNS CURRETTES

## (undated) DEVICE — SKIN AFFIX SURG ADHESIVE 72/CS 0.55ML: Brand: MEDLINE

## (undated) DEVICE — SET ORTHO STD STORTSTD2

## (undated) DEVICE — GAUZE,SPONGE,4"X4",16PLY,XRAY,STRL,LF: Brand: MEDLINE

## (undated) DEVICE — LOCATOR RAD PASS SPHR MRK NAVIGATION BALL DISP STLTH STN

## (undated) DEVICE — TUBING SUCT 12FR MAL ALUM SHFT FN CAP VENT UNIV CONN W/ OBT

## (undated) DEVICE — INTENDED FOR TISSUE SEPARATION, AND OTHER PROCEDURES THAT REQUIRE A SHARP SURGICAL BLADE TO PUNCTURE OR CUT.: Brand: BARD-PARKER ® STAINLESS STEEL BLADES

## (undated) DEVICE — GOWN,AURORA,BRTHSLV,2XL,18/CS: Brand: MEDLINE

## (undated) DEVICE — DOUBLE BASIN SET: Brand: MEDLINE INDUSTRIES, INC.

## (undated) DEVICE — PAD,ABDOMINAL,5"X9",ST,LF,25/BX: Brand: MEDLINE INDUSTRIES, INC.

## (undated) DEVICE — Z DISCONTINUED USE 2275686 GLOVE SURG SZ 8 L12IN FNGR THK13MIL WHT ISOLEX POLYISOPRENE

## (undated) DEVICE — GOWN,SIRUS,FABRNF,L,20/CS: Brand: MEDLINE

## (undated) DEVICE — GARMENT,MEDLINE,DVT,INT,CALF,MED, GEN2: Brand: MEDLINE

## (undated) DEVICE — 1000 S-DRAPE TOWEL DRAPE 10/BX: Brand: STERI-DRAPE™

## (undated) DEVICE — DRIP REDUCTION MANIFOLD

## (undated) DEVICE — SET PROC W/ 250ML BOWL 30UM FLTR RESVR BRAT 2

## (undated) DEVICE — SPHERE EYE 1 MRK GUIDANCE PASS STEALTHSTATION 1PK/EA

## (undated) DEVICE — COVER,LIGHT HANDLE,FLX,1/PK: Brand: MEDLINE INDUSTRIES, INC.

## (undated) DEVICE — 3M™ COBAN™ NL STERILE NON-LATEX SELF-ADHERENT WRAP, 2084S, 4 IN X 5 YD (10 CM X 4,5 M), 18 ROLLS/CASE: Brand: 3M™ COBAN™

## (undated) DEVICE — MEDI-VAC YANKAUER SUCTION HANDLE: Brand: CARDINAL HEALTH

## (undated) DEVICE — HYPODERMIC SAFETY NEEDLE: Brand: MAGELLAN

## (undated) DEVICE — LABEL MED 4 IN SURG PANEL W/ PEN STRL

## (undated) DEVICE — READY WET SKIN SCRUB TRAY-LF: Brand: MEDLINE INDUSTRIES, INC.

## (undated) DEVICE — DRILL SYSTEM 7

## (undated) DEVICE — APPLICATOR MEDICATED 26 CC SOLUTION HI LT ORNG CHLORAPREP

## (undated) DEVICE — BANDAGE,GAUZE,4.5"X4.1YD,STERILE,LF: Brand: MEDLINE

## (undated) DEVICE — BANDAGE COMPR W4INXL5YD WHT BGE POLY COT M E WRP WV HK AND

## (undated) DEVICE — DRAPE PATIENT ISOL IRRIG POUCH

## (undated) DEVICE — GUIDEWIRE ORTH L400MM DIA3.2MM FOR TFN

## (undated) DEVICE — SURGICAL PROCEDURE PACK LAMINECTOMY LUMBAR

## (undated) DEVICE — SET MAJOR INSTR ORTHO

## (undated) DEVICE — SHEET, T, LAPAROTOMY, STERILE: Brand: MEDLINE

## (undated) DEVICE — BONE FILLER DEVICE F04B SIZE 3

## (undated) DEVICE — SOLUTION IV IRRIG 250ML ST LF 0.9% SODIUM 2F7122

## (undated) DEVICE — 1010 S-DRAPE TOWEL DRAPE 10/BX: Brand: STERI-DRAPE™

## (undated) DEVICE — DRAPE C ARM W41XL74IN UNIV MOB W RUBBERBAND CLP

## (undated) DEVICE — GAUZE,SPONGE,4"X4",16PLY,STRL,LF,10/TRAY: Brand: MEDLINE

## (undated) DEVICE — ELECTRODE PT RET AD L9FT HI MOIST COND ADH HYDRGEL CORDED

## (undated) DEVICE — GRADUATE

## (undated) DEVICE — BANDAGE ELASTIC COMPRSS ESMRK 4.0INX3.0YD

## (undated) DEVICE — TUBING, SUCTION, 1/4" X 10', STRAIGHT: Brand: MEDLINE

## (undated) DEVICE — NDL CNTR 40CT FM MAG: Brand: MEDLINE INDUSTRIES, INC.

## (undated) DEVICE — Z DISCONTINUED PER MEDLINE USE 2741942 DRESSING AQUACEL 6 IN ALG W9XL15CM SIL CVR WTRPRF VIR BACT BARR ANTIMIC

## (undated) DEVICE — NEEDLE SPNL L3.5IN PNK HUB S STL REG WALL FIT STYL W/ QNCKE

## (undated) DEVICE — THE MILL DISPOSABLE - MEDIUM

## (undated) DEVICE — KIT SYR 1ML 2ML TRAUM TRAUMCEM V+

## (undated) DEVICE — SOLUTION IV IRRIG WATER 1000ML POUR BRL 2F7114

## (undated) DEVICE — CODMAN® SURGICAL PATTIES 1/2" X 1" (1.27CM X 2.54CM): Brand: CODMAN®

## (undated) DEVICE — TOWEL,OR,DSP,ST,BLUE,STD,6/PK,12PK/CS: Brand: MEDLINE

## (undated) DEVICE — SET SPINAL NEURO STNEU1

## (undated) DEVICE — GLOVE SURG SZ 85 STD WHT LTX SYN POLYMER BEAD REINF ANTI RL

## (undated) DEVICE — CANNULA INJ FOR TFNA SYS STRL TRAUMACEM V+

## (undated) DEVICE — TOTAL TRAY, DB, 100% SILI FOLEY, 16FR 10: Brand: MEDLINE

## (undated) DEVICE — 1.5L THIN WALL CAN: Brand: CRD

## (undated) DEVICE — KIT POS W/ FOAM ARM CRADL SHEARGUARD CHST PD CVR FOR SPNL

## (undated) DEVICE — SYRINGE MED 10ML TRNSLUC BRL PLUNG BLK MRK POLYPR CTRL

## (undated) DEVICE — APPLICATOR PREP 26ML 0.7% IOD POVACRYLEX 74% ISO ALC ST

## (undated) DEVICE — Z DISCONTINUED NO SUB IDED GLOVE SURG BEAD CUF 8 STD PF WHT STRL TRIUMPH LT LTX

## (undated) DEVICE — NEEDLE HYPO 25GA L1.5IN BLU POLYPR HUB S STL REG BVL STR

## (undated) DEVICE — DRESSING ADH N ADH 8X35 IN 6X175 IN SFT CLTH MEDIPORE +

## (undated) DEVICE — 5.0MM ROUND FLUTED

## (undated) DEVICE — SPONGE,LAP,12"X12",XR,ST,5/PK,40PK/CS: Brand: MEDLINE

## (undated) DEVICE — Z CONVERTED USE 2275207 CLOTH PREP W7.5XL7.5IN 2% CHG SKIN ALC AND RNS FREE